# Patient Record
Sex: MALE | Race: ASIAN | NOT HISPANIC OR LATINO | Employment: OTHER | URBAN - METROPOLITAN AREA
[De-identification: names, ages, dates, MRNs, and addresses within clinical notes are randomized per-mention and may not be internally consistent; named-entity substitution may affect disease eponyms.]

---

## 2020-12-25 ENCOUNTER — HOSPITAL ENCOUNTER (INPATIENT)
Facility: HOSPITAL | Age: 70
LOS: 3 days | Discharge: HOME | DRG: 247 | End: 2020-12-28
Attending: EMERGENCY MEDICINE | Admitting: INTERNAL MEDICINE
Payer: MEDICAID

## 2020-12-25 ENCOUNTER — APPOINTMENT (EMERGENCY)
Dept: RADIOLOGY | Facility: HOSPITAL | Age: 70
DRG: 247 | End: 2020-12-25
Payer: MEDICAID

## 2020-12-25 DIAGNOSIS — F32.A DEPRESSION: ICD-10-CM

## 2020-12-25 DIAGNOSIS — I16.0 HYPERTENSIVE URGENCY: ICD-10-CM

## 2020-12-25 DIAGNOSIS — K56.600 PARTIAL SMALL BOWEL OBSTRUCTION (HCC): Primary | ICD-10-CM

## 2020-12-25 DIAGNOSIS — Z86.73 HISTORY OF HEMORRHAGIC CEREBROVASCULAR ACCIDENT (CVA) WITHOUT RESIDUAL DEFICITS: ICD-10-CM

## 2020-12-25 DIAGNOSIS — R10.9 ABDOMINAL PAIN: ICD-10-CM

## 2020-12-25 DIAGNOSIS — K85.90 PANCREATITIS: ICD-10-CM

## 2020-12-25 DIAGNOSIS — K85.90 ACUTE PANCREATITIS: ICD-10-CM

## 2020-12-25 PROBLEM — K59.00 OBSTIPATION: Status: ACTIVE | Noted: 2020-12-25

## 2020-12-25 PROBLEM — R65.10 SYSTEMIC INFLAMMATORY RESPONSE SYNDROME (HCC): Status: ACTIVE | Noted: 2020-12-25

## 2020-12-25 PROBLEM — Z87.19 STATUS POST RIGHT INGUINAL HERNIA REPAIR: Status: ACTIVE | Noted: 2020-12-25

## 2020-12-25 PROBLEM — Z98.890 STATUS POST RIGHT INGUINAL HERNIA REPAIR: Status: ACTIVE | Noted: 2020-12-25

## 2020-12-25 LAB
ALBUMIN SERPL BCP-MCNC: 3.5 G/DL (ref 3.5–5)
ALP SERPL-CCNC: 100 U/L (ref 46–116)
ALT SERPL W P-5'-P-CCNC: 39 U/L (ref 12–78)
ANION GAP SERPL CALCULATED.3IONS-SCNC: 11 MMOL/L (ref 4–13)
APTT PPP: 36 SECONDS (ref 23–37)
AST SERPL W P-5'-P-CCNC: 45 U/L (ref 5–45)
BACTERIA UR QL AUTO: NORMAL /HPF
BASOPHILS # BLD AUTO: 0.03 THOUSANDS/ΜL (ref 0–0.1)
BASOPHILS NFR BLD AUTO: 0 % (ref 0–1)
BILIRUB SERPL-MCNC: 0.8 MG/DL (ref 0.2–1)
BILIRUB UR QL STRIP: NEGATIVE
BUN SERPL-MCNC: 24 MG/DL (ref 5–25)
CALCIUM SERPL-MCNC: 9.2 MG/DL (ref 8.3–10.1)
CHLORIDE SERPL-SCNC: 95 MMOL/L (ref 100–108)
CLARITY UR: CLEAR
CO2 SERPL-SCNC: 29 MMOL/L (ref 21–32)
COLOR UR: YELLOW
CREAT SERPL-MCNC: 1.28 MG/DL (ref 0.6–1.3)
EOSINOPHIL # BLD AUTO: 0.01 THOUSAND/ΜL (ref 0–0.61)
EOSINOPHIL NFR BLD AUTO: 0 % (ref 0–6)
ERYTHROCYTE [DISTWIDTH] IN BLOOD BY AUTOMATED COUNT: 12.8 % (ref 11.6–15.1)
GFR SERPL CREATININE-BSD FRML MDRD: 56 ML/MIN/1.73SQ M
GLUCOSE SERPL-MCNC: 121 MG/DL (ref 65–140)
GLUCOSE UR STRIP-MCNC: NEGATIVE MG/DL
HCT VFR BLD AUTO: 41.6 % (ref 36.5–49.3)
HGB BLD-MCNC: 13.2 G/DL (ref 12–17)
HGB UR QL STRIP.AUTO: ABNORMAL
IMM GRANULOCYTES # BLD AUTO: 0.07 THOUSAND/UL (ref 0–0.2)
IMM GRANULOCYTES NFR BLD AUTO: 0 % (ref 0–2)
INR PPP: 1.22 (ref 0.84–1.19)
KETONES UR STRIP-MCNC: NEGATIVE MG/DL
LEUKOCYTE ESTERASE UR QL STRIP: NEGATIVE
LIPASE SERPL-CCNC: 146 U/L (ref 73–393)
LYMPHOCYTES # BLD AUTO: 1.17 THOUSANDS/ΜL (ref 0.6–4.47)
LYMPHOCYTES NFR BLD AUTO: 7 % (ref 14–44)
MCH RBC QN AUTO: 27.5 PG (ref 26.8–34.3)
MCHC RBC AUTO-ENTMCNC: 31.7 G/DL (ref 31.4–37.4)
MCV RBC AUTO: 87 FL (ref 82–98)
MONOCYTES # BLD AUTO: 1.38 THOUSAND/ΜL (ref 0.17–1.22)
MONOCYTES NFR BLD AUTO: 8 % (ref 4–12)
NEUTROPHILS # BLD AUTO: 15.07 THOUSANDS/ΜL (ref 1.85–7.62)
NEUTS SEG NFR BLD AUTO: 85 % (ref 43–75)
NITRITE UR QL STRIP: NEGATIVE
NON-SQ EPI CELLS URNS QL MICRO: NORMAL /HPF
NRBC BLD AUTO-RTO: 0 /100 WBCS
PH UR STRIP.AUTO: 6.5 [PH]
PLATELET # BLD AUTO: 306 THOUSANDS/UL (ref 149–390)
PMV BLD AUTO: 9.8 FL (ref 8.9–12.7)
POTASSIUM SERPL-SCNC: 2.9 MMOL/L (ref 3.5–5.3)
PROT SERPL-MCNC: 8.4 G/DL (ref 6.4–8.2)
PROT UR STRIP-MCNC: ABNORMAL MG/DL
PROTHROMBIN TIME: 15.3 SECONDS (ref 11.6–14.5)
RBC # BLD AUTO: 4.8 MILLION/UL (ref 3.88–5.62)
RBC #/AREA URNS AUTO: NORMAL /HPF
SODIUM SERPL-SCNC: 135 MMOL/L (ref 136–145)
SP GR UR STRIP.AUTO: <=1.005 (ref 1–1.03)
UROBILINOGEN UR QL STRIP.AUTO: 0.2 E.U./DL
WBC # BLD AUTO: 17.73 THOUSAND/UL (ref 4.31–10.16)
WBC #/AREA URNS AUTO: NORMAL /HPF

## 2020-12-25 PROCEDURE — 83690 ASSAY OF LIPASE: CPT | Performed by: EMERGENCY MEDICINE

## 2020-12-25 PROCEDURE — 36415 COLL VENOUS BLD VENIPUNCTURE: CPT | Performed by: EMERGENCY MEDICINE

## 2020-12-25 PROCEDURE — 93005 ELECTROCARDIOGRAM TRACING: CPT

## 2020-12-25 PROCEDURE — 90662 IIV NO PRSV INCREASED AG IM: CPT | Performed by: INTERNAL MEDICINE

## 2020-12-25 PROCEDURE — 81001 URINALYSIS AUTO W/SCOPE: CPT | Performed by: INTERNAL MEDICINE

## 2020-12-25 PROCEDURE — 99285 EMERGENCY DEPT VISIT HI MDM: CPT | Performed by: EMERGENCY MEDICINE

## 2020-12-25 PROCEDURE — 99285 EMERGENCY DEPT VISIT HI MDM: CPT

## 2020-12-25 PROCEDURE — 80053 COMPREHEN METABOLIC PANEL: CPT | Performed by: EMERGENCY MEDICINE

## 2020-12-25 PROCEDURE — 96374 THER/PROPH/DIAG INJ IV PUSH: CPT

## 2020-12-25 PROCEDURE — 96361 HYDRATE IV INFUSION ADD-ON: CPT

## 2020-12-25 PROCEDURE — G1004 CDSM NDSC: HCPCS

## 2020-12-25 PROCEDURE — 99223 1ST HOSP IP/OBS HIGH 75: CPT | Performed by: INTERNAL MEDICINE

## 2020-12-25 PROCEDURE — 85610 PROTHROMBIN TIME: CPT | Performed by: EMERGENCY MEDICINE

## 2020-12-25 PROCEDURE — 96375 TX/PRO/DX INJ NEW DRUG ADDON: CPT

## 2020-12-25 PROCEDURE — 85730 THROMBOPLASTIN TIME PARTIAL: CPT | Performed by: EMERGENCY MEDICINE

## 2020-12-25 PROCEDURE — 90471 IMMUNIZATION ADMIN: CPT | Performed by: INTERNAL MEDICINE

## 2020-12-25 PROCEDURE — 85025 COMPLETE CBC W/AUTO DIFF WBC: CPT | Performed by: EMERGENCY MEDICINE

## 2020-12-25 PROCEDURE — 74177 CT ABD & PELVIS W/CONTRAST: CPT

## 2020-12-25 RX ORDER — ONDANSETRON 2 MG/ML
4 INJECTION INTRAMUSCULAR; INTRAVENOUS EVERY 6 HOURS PRN
Status: DISCONTINUED | OUTPATIENT
Start: 2020-12-25 | End: 2020-12-28 | Stop reason: HOSPADM

## 2020-12-25 RX ORDER — ATENOLOL 25 MG/1
25 TABLET ORAL DAILY
Status: CANCELLED | OUTPATIENT
Start: 2020-12-26

## 2020-12-25 RX ORDER — POTASSIUM CHLORIDE 14.9 MG/ML
20 INJECTION INTRAVENOUS ONCE
Status: COMPLETED | OUTPATIENT
Start: 2020-12-25 | End: 2020-12-25

## 2020-12-25 RX ORDER — LABETALOL 20 MG/4 ML (5 MG/ML) INTRAVENOUS SYRINGE
10 ONCE
Status: COMPLETED | OUTPATIENT
Start: 2020-12-25 | End: 2020-12-25

## 2020-12-25 RX ORDER — ONDANSETRON 2 MG/ML
4 INJECTION INTRAMUSCULAR; INTRAVENOUS ONCE
Status: COMPLETED | OUTPATIENT
Start: 2020-12-25 | End: 2020-12-25

## 2020-12-25 RX ORDER — ACETAMINOPHEN 325 MG/1
650 TABLET ORAL EVERY 4 HOURS PRN
Status: DISCONTINUED | OUTPATIENT
Start: 2020-12-25 | End: 2020-12-28 | Stop reason: HOSPADM

## 2020-12-25 RX ORDER — DEXTROSE AND SODIUM CHLORIDE 5; .45 G/100ML; G/100ML
100 INJECTION, SOLUTION INTRAVENOUS CONTINUOUS
Status: DISCONTINUED | OUTPATIENT
Start: 2020-12-25 | End: 2020-12-28 | Stop reason: HOSPADM

## 2020-12-25 RX ORDER — LABETALOL 100 MG/1
100 TABLET, FILM COATED ORAL EVERY 12 HOURS SCHEDULED
Status: DISCONTINUED | OUTPATIENT
Start: 2020-12-25 | End: 2020-12-26

## 2020-12-25 RX ORDER — ATENOLOL 25 MG/1
25 TABLET ORAL AS NEEDED
COMMUNITY
End: 2020-12-28 | Stop reason: HOSPADM

## 2020-12-25 RX ORDER — AMLODIPINE BESYLATE 5 MG/1
5 TABLET ORAL 2 TIMES DAILY
Status: DISCONTINUED | OUTPATIENT
Start: 2020-12-25 | End: 2020-12-28 | Stop reason: HOSPADM

## 2020-12-25 RX ORDER — LOSARTAN POTASSIUM 25 MG/1
25 TABLET ORAL 2 TIMES DAILY
Status: DISCONTINUED | OUTPATIENT
Start: 2020-12-25 | End: 2020-12-27

## 2020-12-25 RX ORDER — RANITIDINE 150 MG/1
150 TABLET ORAL
COMMUNITY
End: 2020-12-28 | Stop reason: HOSPADM

## 2020-12-25 RX ORDER — POTASSIUM CHLORIDE 20 MEQ/1
40 TABLET, EXTENDED RELEASE ORAL ONCE
Status: COMPLETED | OUTPATIENT
Start: 2020-12-25 | End: 2020-12-25

## 2020-12-25 RX ORDER — MORPHINE SULFATE 4 MG/ML
4 INJECTION, SOLUTION INTRAMUSCULAR; INTRAVENOUS ONCE
Status: COMPLETED | OUTPATIENT
Start: 2020-12-25 | End: 2020-12-25

## 2020-12-25 RX ADMIN — AMLODIPINE BESYLATE 5 MG: 5 TABLET ORAL at 20:28

## 2020-12-25 RX ADMIN — LOSARTAN POTASSIUM 25 MG: 25 TABLET, FILM COATED ORAL at 20:28

## 2020-12-25 RX ADMIN — ONDANSETRON 4 MG: 2 INJECTION INTRAMUSCULAR; INTRAVENOUS at 16:28

## 2020-12-25 RX ADMIN — DEXTROSE AND SODIUM CHLORIDE 100 ML/HR: 5; .45 INJECTION, SOLUTION INTRAVENOUS at 20:25

## 2020-12-25 RX ADMIN — FAMOTIDINE 20 MG: 10 INJECTION INTRAVENOUS at 20:29

## 2020-12-25 RX ADMIN — MORPHINE SULFATE 4 MG: 4 INJECTION INTRAVENOUS at 16:28

## 2020-12-25 RX ADMIN — SODIUM CHLORIDE 1000 ML: 0.9 INJECTION, SOLUTION INTRAVENOUS at 16:28

## 2020-12-25 RX ADMIN — LABETALOL HYDROCHLORIDE 100 MG: 100 TABLET ORAL at 20:29

## 2020-12-25 RX ADMIN — POTASSIUM CHLORIDE 40 MEQ: 1500 TABLET, EXTENDED RELEASE ORAL at 18:24

## 2020-12-25 RX ADMIN — POTASSIUM CHLORIDE 20 MEQ: 14.9 INJECTION, SOLUTION INTRAVENOUS at 18:24

## 2020-12-25 RX ADMIN — INFLUENZA A VIRUS A/MICHIGAN/45/2015 X-275 (H1N1) ANTIGEN (FORMALDEHYDE INACTIVATED), INFLUENZA A VIRUS A/SINGAPORE/INFIMH-16-0019/2016 IVR-186 (H3N2) ANTIGEN (FORMALDEHYDE INACTIVATED), INFLUENZA B VIRUS B/PHUKET/3073/2013 ANTIGEN (FORMALDEHYDE INACTIVATED), AND INFLUENZA B VIRUS B/MARYLAND/15/2016 BX-69A ANTIGEN (FORMALDEHYDE INACTIVATED) 0.7 ML: 60; 60; 60; 60 INJECTION, SUSPENSION INTRAMUSCULAR at 20:29

## 2020-12-25 RX ADMIN — IOHEXOL 100 ML: 350 INJECTION, SOLUTION INTRAVENOUS at 17:22

## 2020-12-25 RX ADMIN — LABETALOL 20 MG/4 ML (5 MG/ML) INTRAVENOUS SYRINGE 10 MG: at 18:14

## 2020-12-26 ENCOUNTER — APPOINTMENT (INPATIENT)
Dept: RADIOLOGY | Facility: HOSPITAL | Age: 70
DRG: 247 | End: 2020-12-26
Payer: MEDICAID

## 2020-12-26 LAB
ALBUMIN SERPL BCP-MCNC: 2.7 G/DL (ref 3.5–5)
ALP SERPL-CCNC: 80 U/L (ref 46–116)
ALT SERPL W P-5'-P-CCNC: 31 U/L (ref 12–78)
ANION GAP SERPL CALCULATED.3IONS-SCNC: 7 MMOL/L (ref 4–13)
AST SERPL W P-5'-P-CCNC: 30 U/L (ref 5–45)
BASOPHILS # BLD AUTO: 0.02 THOUSANDS/ΜL (ref 0–0.1)
BASOPHILS NFR BLD AUTO: 0 % (ref 0–1)
BILIRUB DIRECT SERPL-MCNC: 0.19 MG/DL (ref 0–0.2)
BILIRUB SERPL-MCNC: 0.5 MG/DL (ref 0.2–1)
BUN SERPL-MCNC: 18 MG/DL (ref 5–25)
CALCIUM SERPL-MCNC: 8.2 MG/DL (ref 8.3–10.1)
CHLORIDE SERPL-SCNC: 101 MMOL/L (ref 100–108)
CHOLEST SERPL-MCNC: 98 MG/DL (ref 50–200)
CO2 SERPL-SCNC: 26 MMOL/L (ref 21–32)
CREAT SERPL-MCNC: 1.04 MG/DL (ref 0.6–1.3)
EOSINOPHIL # BLD AUTO: 0.01 THOUSAND/ΜL (ref 0–0.61)
EOSINOPHIL NFR BLD AUTO: 0 % (ref 0–6)
ERYTHROCYTE [DISTWIDTH] IN BLOOD BY AUTOMATED COUNT: 12.7 % (ref 11.6–15.1)
EST. AVERAGE GLUCOSE BLD GHB EST-MCNC: 114 MG/DL
GFR SERPL CREATININE-BSD FRML MDRD: 71 ML/MIN/1.73SQ M
GLUCOSE SERPL-MCNC: 142 MG/DL (ref 65–140)
HBA1C MFR BLD: 5.6 %
HCT VFR BLD AUTO: 34.2 % (ref 36.5–49.3)
HDLC SERPL-MCNC: 46 MG/DL
HGB BLD-MCNC: 10.9 G/DL (ref 12–17)
IMM GRANULOCYTES # BLD AUTO: 0.05 THOUSAND/UL (ref 0–0.2)
IMM GRANULOCYTES NFR BLD AUTO: 0 % (ref 0–2)
LDLC SERPL CALC-MCNC: 40 MG/DL (ref 0–100)
LIPASE SERPL-CCNC: 109 U/L (ref 73–393)
LYMPHOCYTES # BLD AUTO: 0.84 THOUSANDS/ΜL (ref 0.6–4.47)
LYMPHOCYTES NFR BLD AUTO: 7 % (ref 14–44)
MCH RBC QN AUTO: 27.5 PG (ref 26.8–34.3)
MCHC RBC AUTO-ENTMCNC: 31.9 G/DL (ref 31.4–37.4)
MCV RBC AUTO: 86 FL (ref 82–98)
MONOCYTES # BLD AUTO: 1.17 THOUSAND/ΜL (ref 0.17–1.22)
MONOCYTES NFR BLD AUTO: 10 % (ref 4–12)
NEUTROPHILS # BLD AUTO: 9.8 THOUSANDS/ΜL (ref 1.85–7.62)
NEUTS SEG NFR BLD AUTO: 83 % (ref 43–75)
NRBC BLD AUTO-RTO: 0 /100 WBCS
PLATELET # BLD AUTO: 240 THOUSANDS/UL (ref 149–390)
PMV BLD AUTO: 10 FL (ref 8.9–12.7)
POTASSIUM SERPL-SCNC: 3.2 MMOL/L (ref 3.5–5.3)
PROT SERPL-MCNC: 6.9 G/DL (ref 6.4–8.2)
RBC # BLD AUTO: 3.97 MILLION/UL (ref 3.88–5.62)
SODIUM SERPL-SCNC: 134 MMOL/L (ref 136–145)
TRIGL SERPL-MCNC: 60 MG/DL
WBC # BLD AUTO: 11.89 THOUSAND/UL (ref 4.31–10.16)

## 2020-12-26 PROCEDURE — 80061 LIPID PANEL: CPT | Performed by: INTERNAL MEDICINE

## 2020-12-26 PROCEDURE — 85025 COMPLETE CBC W/AUTO DIFF WBC: CPT | Performed by: INTERNAL MEDICINE

## 2020-12-26 PROCEDURE — 99222 1ST HOSP IP/OBS MODERATE 55: CPT | Performed by: SURGERY

## 2020-12-26 PROCEDURE — G1004 CDSM NDSC: HCPCS

## 2020-12-26 PROCEDURE — 74177 CT ABD & PELVIS W/CONTRAST: CPT

## 2020-12-26 PROCEDURE — 83690 ASSAY OF LIPASE: CPT | Performed by: INTERNAL MEDICINE

## 2020-12-26 PROCEDURE — 76705 ECHO EXAM OF ABDOMEN: CPT

## 2020-12-26 PROCEDURE — 83036 HEMOGLOBIN GLYCOSYLATED A1C: CPT | Performed by: INTERNAL MEDICINE

## 2020-12-26 PROCEDURE — 80076 HEPATIC FUNCTION PANEL: CPT | Performed by: INTERNAL MEDICINE

## 2020-12-26 PROCEDURE — 80048 BASIC METABOLIC PNL TOTAL CA: CPT | Performed by: INTERNAL MEDICINE

## 2020-12-26 RX ORDER — LABETALOL 100 MG/1
200 TABLET, FILM COATED ORAL EVERY 12 HOURS SCHEDULED
Status: DISCONTINUED | OUTPATIENT
Start: 2020-12-26 | End: 2020-12-28 | Stop reason: HOSPADM

## 2020-12-26 RX ADMIN — AMLODIPINE BESYLATE 5 MG: 5 TABLET ORAL at 09:56

## 2020-12-26 RX ADMIN — AMLODIPINE BESYLATE 5 MG: 5 TABLET ORAL at 21:22

## 2020-12-26 RX ADMIN — DEXTROSE AND SODIUM CHLORIDE 100 ML/HR: 5; .45 INJECTION, SOLUTION INTRAVENOUS at 06:14

## 2020-12-26 RX ADMIN — ENOXAPARIN SODIUM 40 MG: 40 INJECTION SUBCUTANEOUS at 09:57

## 2020-12-26 RX ADMIN — DEXTROSE AND SODIUM CHLORIDE 100 ML/HR: 5; .45 INJECTION, SOLUTION INTRAVENOUS at 16:58

## 2020-12-26 RX ADMIN — LOSARTAN POTASSIUM 25 MG: 25 TABLET, FILM COATED ORAL at 21:23

## 2020-12-26 RX ADMIN — FAMOTIDINE 20 MG: 10 INJECTION INTRAVENOUS at 09:57

## 2020-12-26 RX ADMIN — LOSARTAN POTASSIUM 25 MG: 25 TABLET, FILM COATED ORAL at 09:58

## 2020-12-26 RX ADMIN — MORPHINE SULFATE 2 MG: 2 INJECTION, SOLUTION INTRAMUSCULAR; INTRAVENOUS at 18:39

## 2020-12-26 RX ADMIN — LABETALOL HYDROCHLORIDE 200 MG: 100 TABLET ORAL at 21:22

## 2020-12-26 RX ADMIN — FAMOTIDINE 20 MG: 10 INJECTION INTRAVENOUS at 21:23

## 2020-12-26 RX ADMIN — IOHEXOL 100 ML: 350 INJECTION, SOLUTION INTRAVENOUS at 11:30

## 2020-12-26 RX ADMIN — IOHEXOL 50 ML: 240 INJECTION, SOLUTION INTRATHECAL; INTRAVASCULAR; INTRAVENOUS; ORAL at 09:57

## 2020-12-27 ENCOUNTER — APPOINTMENT (INPATIENT)
Dept: RADIOLOGY | Facility: HOSPITAL | Age: 70
DRG: 247 | End: 2020-12-27
Payer: MEDICAID

## 2020-12-27 LAB
ANION GAP SERPL CALCULATED.3IONS-SCNC: 6 MMOL/L (ref 4–13)
ATRIAL RATE: 95 BPM
BASOPHILS # BLD AUTO: 0.04 THOUSANDS/ΜL (ref 0–0.1)
BASOPHILS NFR BLD AUTO: 0 % (ref 0–1)
BUN SERPL-MCNC: 12 MG/DL (ref 5–25)
CALCIUM SERPL-MCNC: 8.5 MG/DL (ref 8.3–10.1)
CHLORIDE SERPL-SCNC: 100 MMOL/L (ref 100–108)
CO2 SERPL-SCNC: 28 MMOL/L (ref 21–32)
CREAT SERPL-MCNC: 1.06 MG/DL (ref 0.6–1.3)
EOSINOPHIL # BLD AUTO: 0.06 THOUSAND/ΜL (ref 0–0.61)
EOSINOPHIL NFR BLD AUTO: 1 % (ref 0–6)
ERYTHROCYTE [DISTWIDTH] IN BLOOD BY AUTOMATED COUNT: 12.9 % (ref 11.6–15.1)
GFR SERPL CREATININE-BSD FRML MDRD: 70 ML/MIN/1.73SQ M
GLUCOSE SERPL-MCNC: 113 MG/DL (ref 65–140)
HCT VFR BLD AUTO: 35.6 % (ref 36.5–49.3)
HGB BLD-MCNC: 11.2 G/DL (ref 12–17)
IMM GRANULOCYTES # BLD AUTO: 0.03 THOUSAND/UL (ref 0–0.2)
IMM GRANULOCYTES NFR BLD AUTO: 0 % (ref 0–2)
LYMPHOCYTES # BLD AUTO: 1.07 THOUSANDS/ΜL (ref 0.6–4.47)
LYMPHOCYTES NFR BLD AUTO: 12 % (ref 14–44)
MCH RBC QN AUTO: 27.5 PG (ref 26.8–34.3)
MCHC RBC AUTO-ENTMCNC: 31.5 G/DL (ref 31.4–37.4)
MCV RBC AUTO: 87 FL (ref 82–98)
MONOCYTES # BLD AUTO: 1.18 THOUSAND/ΜL (ref 0.17–1.22)
MONOCYTES NFR BLD AUTO: 13 % (ref 4–12)
NEUTROPHILS # BLD AUTO: 6.76 THOUSANDS/ΜL (ref 1.85–7.62)
NEUTS SEG NFR BLD AUTO: 74 % (ref 43–75)
NRBC BLD AUTO-RTO: 0 /100 WBCS
P AXIS: 27 DEGREES
PLATELET # BLD AUTO: 239 THOUSANDS/UL (ref 149–390)
PMV BLD AUTO: 9.2 FL (ref 8.9–12.7)
POTASSIUM SERPL-SCNC: 3.1 MMOL/L (ref 3.5–5.3)
PR INTERVAL: 170 MS
QRS AXIS: 0 DEGREES
QRSD INTERVAL: 90 MS
QT INTERVAL: 344 MS
QTC INTERVAL: 432 MS
RBC # BLD AUTO: 4.08 MILLION/UL (ref 3.88–5.62)
SODIUM SERPL-SCNC: 134 MMOL/L (ref 136–145)
T WAVE AXIS: 0 DEGREES
VENTRICULAR RATE: 95 BPM
WBC # BLD AUTO: 9.14 THOUSAND/UL (ref 4.31–10.16)

## 2020-12-27 PROCEDURE — 74018 RADEX ABDOMEN 1 VIEW: CPT

## 2020-12-27 PROCEDURE — 99232 SBSQ HOSP IP/OBS MODERATE 35: CPT | Performed by: INTERNAL MEDICINE

## 2020-12-27 PROCEDURE — 80048 BASIC METABOLIC PNL TOTAL CA: CPT | Performed by: SURGERY

## 2020-12-27 PROCEDURE — 85025 COMPLETE CBC W/AUTO DIFF WBC: CPT | Performed by: SURGERY

## 2020-12-27 PROCEDURE — 99232 SBSQ HOSP IP/OBS MODERATE 35: CPT | Performed by: SURGERY

## 2020-12-27 PROCEDURE — 97162 PT EVAL MOD COMPLEX 30 MIN: CPT

## 2020-12-27 PROCEDURE — 93010 ELECTROCARDIOGRAM REPORT: CPT | Performed by: INTERNAL MEDICINE

## 2020-12-27 RX ORDER — MAGNESIUM CARB/ALUMINUM HYDROX 105-160MG
296 TABLET,CHEWABLE ORAL ONCE
Status: COMPLETED | OUTPATIENT
Start: 2020-12-27 | End: 2020-12-27

## 2020-12-27 RX ORDER — POTASSIUM CHLORIDE 29.8 MG/ML
40 INJECTION INTRAVENOUS ONCE
Status: DISCONTINUED | OUTPATIENT
Start: 2020-12-27 | End: 2020-12-27 | Stop reason: CLARIF

## 2020-12-27 RX ORDER — MINERAL OIL 100 G/100G
1 OIL RECTAL ONCE
Status: COMPLETED | OUTPATIENT
Start: 2020-12-27 | End: 2020-12-27

## 2020-12-27 RX ORDER — POTASSIUM CHLORIDE 20 MEQ/1
40 TABLET, EXTENDED RELEASE ORAL ONCE
Status: COMPLETED | OUTPATIENT
Start: 2020-12-27 | End: 2020-12-27

## 2020-12-27 RX ORDER — ASPIRIN 81 MG/1
81 TABLET ORAL DAILY
Status: DISCONTINUED | OUTPATIENT
Start: 2020-12-27 | End: 2020-12-28 | Stop reason: HOSPADM

## 2020-12-27 RX ORDER — POLYETHYLENE GLYCOL 3350 17 G/17G
17 POWDER, FOR SOLUTION ORAL DAILY PRN
Status: DISCONTINUED | OUTPATIENT
Start: 2020-12-27 | End: 2020-12-28 | Stop reason: HOSPADM

## 2020-12-27 RX ORDER — ATORVASTATIN CALCIUM 10 MG/1
10 TABLET, FILM COATED ORAL
Status: DISCONTINUED | OUTPATIENT
Start: 2020-12-27 | End: 2020-12-28 | Stop reason: HOSPADM

## 2020-12-27 RX ORDER — SENNOSIDES 8.6 MG
1 TABLET ORAL
Status: DISCONTINUED | OUTPATIENT
Start: 2020-12-27 | End: 2020-12-28 | Stop reason: HOSPADM

## 2020-12-27 RX ORDER — LOSARTAN POTASSIUM 50 MG/1
50 TABLET ORAL 2 TIMES DAILY
Status: DISCONTINUED | OUTPATIENT
Start: 2020-12-27 | End: 2020-12-28 | Stop reason: HOSPADM

## 2020-12-27 RX ORDER — DOCUSATE SODIUM 100 MG/1
100 CAPSULE, LIQUID FILLED ORAL 2 TIMES DAILY
Status: DISCONTINUED | OUTPATIENT
Start: 2020-12-27 | End: 2020-12-28 | Stop reason: HOSPADM

## 2020-12-27 RX ORDER — POTASSIUM CHLORIDE 14.9 MG/ML
20 INJECTION INTRAVENOUS ONCE
Status: COMPLETED | OUTPATIENT
Start: 2020-12-27 | End: 2020-12-28

## 2020-12-27 RX ADMIN — DEXTROSE AND SODIUM CHLORIDE 100 ML/HR: 5; .45 INJECTION, SOLUTION INTRAVENOUS at 22:40

## 2020-12-27 RX ADMIN — POTASSIUM CHLORIDE 20 MEQ: 14.9 INJECTION, SOLUTION INTRAVENOUS at 12:22

## 2020-12-27 RX ADMIN — MORPHINE SULFATE 2 MG: 2 INJECTION, SOLUTION INTRAMUSCULAR; INTRAVENOUS at 00:09

## 2020-12-27 RX ADMIN — DEXTROSE AND SODIUM CHLORIDE 100 ML/HR: 5; .45 INJECTION, SOLUTION INTRAVENOUS at 10:21

## 2020-12-27 RX ADMIN — DEXTROSE AND SODIUM CHLORIDE 100 ML/HR: 5; .45 INJECTION, SOLUTION INTRAVENOUS at 02:26

## 2020-12-27 RX ADMIN — ASPIRIN 81 MG: 81 TABLET, COATED ORAL at 10:07

## 2020-12-27 RX ADMIN — DOCUSATE SODIUM 100 MG: 100 CAPSULE, LIQUID FILLED ORAL at 09:59

## 2020-12-27 RX ADMIN — AMLODIPINE BESYLATE 5 MG: 5 TABLET ORAL at 21:13

## 2020-12-27 RX ADMIN — AMLODIPINE BESYLATE 5 MG: 5 TABLET ORAL at 09:59

## 2020-12-27 RX ADMIN — ENOXAPARIN SODIUM 40 MG: 40 INJECTION SUBCUTANEOUS at 09:59

## 2020-12-27 RX ADMIN — ATORVASTATIN CALCIUM 10 MG: 10 TABLET, FILM COATED ORAL at 17:02

## 2020-12-27 RX ADMIN — DOCUSATE SODIUM 100 MG: 100 CAPSULE, LIQUID FILLED ORAL at 17:02

## 2020-12-27 RX ADMIN — POTASSIUM CHLORIDE 40 MEQ: 1500 TABLET, EXTENDED RELEASE ORAL at 10:07

## 2020-12-27 RX ADMIN — FAMOTIDINE 20 MG: 10 INJECTION INTRAVENOUS at 21:14

## 2020-12-27 RX ADMIN — MINERAL OIL 1 ENEMA: 100 ENEMA RECTAL at 10:13

## 2020-12-27 RX ADMIN — LABETALOL HYDROCHLORIDE 200 MG: 100 TABLET ORAL at 09:59

## 2020-12-27 RX ADMIN — MAGNESIUM CITRATE 296 ML: 1.75 LIQUID ORAL at 10:08

## 2020-12-27 RX ADMIN — SENNOSIDES 8.6 MG: 8.6 TABLET ORAL at 21:19

## 2020-12-27 RX ADMIN — LABETALOL HYDROCHLORIDE 200 MG: 100 TABLET ORAL at 21:13

## 2020-12-27 RX ADMIN — FAMOTIDINE 20 MG: 10 INJECTION INTRAVENOUS at 09:59

## 2020-12-27 RX ADMIN — LOSARTAN POTASSIUM 50 MG: 50 TABLET, FILM COATED ORAL at 21:14

## 2020-12-27 RX ADMIN — POTASSIUM CHLORIDE 20 MEQ: 14.9 INJECTION, SOLUTION INTRAVENOUS at 10:17

## 2020-12-28 VITALS
HEIGHT: 74 IN | RESPIRATION RATE: 17 BRPM | SYSTOLIC BLOOD PRESSURE: 122 MMHG | OXYGEN SATURATION: 98 % | WEIGHT: 168.21 LBS | HEART RATE: 72 BPM | DIASTOLIC BLOOD PRESSURE: 80 MMHG | BODY MASS INDEX: 21.59 KG/M2 | TEMPERATURE: 99.3 F

## 2020-12-28 LAB
ALBUMIN SERPL BCP-MCNC: 2.4 G/DL (ref 3.5–5)
ALP SERPL-CCNC: 80 U/L (ref 46–116)
ALT SERPL W P-5'-P-CCNC: 55 U/L (ref 12–78)
ANION GAP SERPL CALCULATED.3IONS-SCNC: 7 MMOL/L (ref 4–13)
AST SERPL W P-5'-P-CCNC: 41 U/L (ref 5–45)
BASOPHILS # BLD AUTO: 0.05 THOUSANDS/ΜL (ref 0–0.1)
BASOPHILS NFR BLD AUTO: 1 % (ref 0–1)
BILIRUB SERPL-MCNC: 0.4 MG/DL (ref 0.2–1)
BUN SERPL-MCNC: 10 MG/DL (ref 5–25)
CALCIUM ALBUM COR SERPL-MCNC: 9.3 MG/DL (ref 8.3–10.1)
CALCIUM SERPL-MCNC: 8 MG/DL (ref 8.3–10.1)
CHLORIDE SERPL-SCNC: 101 MMOL/L (ref 100–108)
CO2 SERPL-SCNC: 25 MMOL/L (ref 21–32)
CREAT SERPL-MCNC: 0.95 MG/DL (ref 0.6–1.3)
EOSINOPHIL # BLD AUTO: 0.11 THOUSAND/ΜL (ref 0–0.61)
EOSINOPHIL NFR BLD AUTO: 1 % (ref 0–6)
ERYTHROCYTE [DISTWIDTH] IN BLOOD BY AUTOMATED COUNT: 12.6 % (ref 11.6–15.1)
GFR SERPL CREATININE-BSD FRML MDRD: 80 ML/MIN/1.73SQ M
GLUCOSE SERPL-MCNC: 127 MG/DL (ref 65–140)
HCT VFR BLD AUTO: 31.9 % (ref 36.5–49.3)
HGB BLD-MCNC: 10.2 G/DL (ref 12–17)
IMM GRANULOCYTES # BLD AUTO: 0.04 THOUSAND/UL (ref 0–0.2)
IMM GRANULOCYTES NFR BLD AUTO: 1 % (ref 0–2)
LYMPHOCYTES # BLD AUTO: 1.05 THOUSANDS/ΜL (ref 0.6–4.47)
LYMPHOCYTES NFR BLD AUTO: 13 % (ref 14–44)
MAGNESIUM SERPL-MCNC: 2.1 MG/DL (ref 1.6–2.6)
MCH RBC QN AUTO: 27.4 PG (ref 26.8–34.3)
MCHC RBC AUTO-ENTMCNC: 32 G/DL (ref 31.4–37.4)
MCV RBC AUTO: 86 FL (ref 82–98)
MONOCYTES # BLD AUTO: 1.26 THOUSAND/ΜL (ref 0.17–1.22)
MONOCYTES NFR BLD AUTO: 15 % (ref 4–12)
NEUTROPHILS # BLD AUTO: 5.69 THOUSANDS/ΜL (ref 1.85–7.62)
NEUTS SEG NFR BLD AUTO: 69 % (ref 43–75)
NRBC BLD AUTO-RTO: 0 /100 WBCS
PHOSPHATE SERPL-MCNC: 2.8 MG/DL (ref 2.3–4.1)
PLATELET # BLD AUTO: 224 THOUSANDS/UL (ref 149–390)
PMV BLD AUTO: 9.3 FL (ref 8.9–12.7)
POTASSIUM SERPL-SCNC: 3.3 MMOL/L (ref 3.5–5.3)
PROT SERPL-MCNC: 6.4 G/DL (ref 6.4–8.2)
RBC # BLD AUTO: 3.72 MILLION/UL (ref 3.88–5.62)
SODIUM SERPL-SCNC: 133 MMOL/L (ref 136–145)
WBC # BLD AUTO: 8.2 THOUSAND/UL (ref 4.31–10.16)

## 2020-12-28 PROCEDURE — 84100 ASSAY OF PHOSPHORUS: CPT | Performed by: INTERNAL MEDICINE

## 2020-12-28 PROCEDURE — 99232 SBSQ HOSP IP/OBS MODERATE 35: CPT | Performed by: SURGERY

## 2020-12-28 PROCEDURE — 99239 HOSP IP/OBS DSCHRG MGMT >30: CPT | Performed by: INTERNAL MEDICINE

## 2020-12-28 PROCEDURE — 80053 COMPREHEN METABOLIC PANEL: CPT | Performed by: INTERNAL MEDICINE

## 2020-12-28 PROCEDURE — 85025 COMPLETE CBC W/AUTO DIFF WBC: CPT | Performed by: INTERNAL MEDICINE

## 2020-12-28 PROCEDURE — 83735 ASSAY OF MAGNESIUM: CPT | Performed by: INTERNAL MEDICINE

## 2020-12-28 RX ORDER — AMLODIPINE BESYLATE 5 MG/1
5 TABLET ORAL 2 TIMES DAILY
Qty: 60 TABLET | Refills: 0 | Status: SHIPPED | OUTPATIENT
Start: 2020-12-28 | End: 2021-01-22 | Stop reason: SDUPTHER

## 2020-12-28 RX ORDER — ASPIRIN 81 MG/1
81 TABLET ORAL DAILY
Qty: 30 TABLET | Refills: 0 | Status: SHIPPED | OUTPATIENT
Start: 2020-12-29 | End: 2021-01-22 | Stop reason: SDUPTHER

## 2020-12-28 RX ORDER — LABETALOL 200 MG/1
200 TABLET, FILM COATED ORAL EVERY 12 HOURS SCHEDULED
Qty: 60 TABLET | Refills: 0 | Status: SHIPPED | OUTPATIENT
Start: 2020-12-28 | End: 2021-01-22 | Stop reason: SDUPTHER

## 2020-12-28 RX ORDER — ATORVASTATIN CALCIUM 10 MG/1
10 TABLET, FILM COATED ORAL
Qty: 30 TABLET | Refills: 0 | Status: SHIPPED | OUTPATIENT
Start: 2020-12-28 | End: 2021-01-22 | Stop reason: SDUPTHER

## 2020-12-28 RX ORDER — POLYETHYLENE GLYCOL 3350 17 G/17G
17 POWDER, FOR SOLUTION ORAL DAILY PRN
Qty: 14 EACH | Refills: 0 | Status: SHIPPED | OUTPATIENT
Start: 2020-12-28 | End: 2021-01-22 | Stop reason: SDUPTHER

## 2020-12-28 RX ORDER — DOCUSATE SODIUM 100 MG/1
100 CAPSULE, LIQUID FILLED ORAL 2 TIMES DAILY
Qty: 10 CAPSULE | Refills: 0 | Status: SHIPPED | OUTPATIENT
Start: 2020-12-28 | End: 2021-01-22 | Stop reason: SDUPTHER

## 2020-12-28 RX ORDER — LOSARTAN POTASSIUM 50 MG/1
50 TABLET ORAL 2 TIMES DAILY
Qty: 60 TABLET | Refills: 0 | Status: SHIPPED | OUTPATIENT
Start: 2020-12-28 | End: 2021-01-22 | Stop reason: SDUPTHER

## 2020-12-28 RX ORDER — ESCITALOPRAM OXALATE 5 MG/1
5 TABLET ORAL DAILY
Qty: 30 TABLET | Refills: 0 | Status: SHIPPED | OUTPATIENT
Start: 2020-12-28 | End: 2021-01-22 | Stop reason: SDUPTHER

## 2020-12-28 RX ADMIN — LABETALOL HYDROCHLORIDE 200 MG: 100 TABLET ORAL at 09:13

## 2020-12-28 RX ADMIN — ATORVASTATIN CALCIUM 10 MG: 10 TABLET, FILM COATED ORAL at 16:23

## 2020-12-28 RX ADMIN — AMLODIPINE BESYLATE 5 MG: 5 TABLET ORAL at 09:12

## 2020-12-28 RX ADMIN — DOCUSATE SODIUM 100 MG: 100 CAPSULE, LIQUID FILLED ORAL at 09:12

## 2020-12-28 RX ADMIN — ENOXAPARIN SODIUM 40 MG: 40 INJECTION SUBCUTANEOUS at 09:27

## 2020-12-28 RX ADMIN — FAMOTIDINE 20 MG: 10 INJECTION INTRAVENOUS at 09:27

## 2020-12-28 RX ADMIN — ASPIRIN 81 MG: 81 TABLET, COATED ORAL at 09:13

## 2020-12-28 RX ADMIN — LOSARTAN POTASSIUM 50 MG: 50 TABLET, FILM COATED ORAL at 09:12

## 2020-12-28 RX ADMIN — DOCUSATE SODIUM 100 MG: 100 CAPSULE, LIQUID FILLED ORAL at 18:00

## 2020-12-28 RX ADMIN — DEXTROSE AND SODIUM CHLORIDE 100 ML/HR: 5; .45 INJECTION, SOLUTION INTRAVENOUS at 05:45

## 2021-01-04 ENCOUNTER — OFFICE VISIT (OUTPATIENT)
Dept: GASTROENTEROLOGY | Facility: CLINIC | Age: 71
End: 2021-01-04
Payer: MEDICAID

## 2021-01-04 VITALS
BODY MASS INDEX: 22.51 KG/M2 | HEIGHT: 74 IN | WEIGHT: 175.4 LBS | SYSTOLIC BLOOD PRESSURE: 124 MMHG | DIASTOLIC BLOOD PRESSURE: 74 MMHG | TEMPERATURE: 96.1 F

## 2021-01-04 DIAGNOSIS — R10.13 EPIGASTRIC PAIN: ICD-10-CM

## 2021-01-04 DIAGNOSIS — K85.90 ACUTE PANCREATITIS: ICD-10-CM

## 2021-01-04 DIAGNOSIS — K56.600 PARTIAL SMALL BOWEL OBSTRUCTION (HCC): ICD-10-CM

## 2021-01-04 DIAGNOSIS — R19.8 CHANGE IN BOWEL MOVEMENT: ICD-10-CM

## 2021-01-04 DIAGNOSIS — D50.8 OTHER IRON DEFICIENCY ANEMIA: ICD-10-CM

## 2021-01-04 DIAGNOSIS — R93.5 ABNORMAL CT OF THE ABDOMEN: Primary | ICD-10-CM

## 2021-01-04 PROBLEM — D64.9 ANEMIA: Status: ACTIVE | Noted: 2021-01-04

## 2021-01-04 PROCEDURE — 99244 OFF/OP CNSLTJ NEW/EST MOD 40: CPT | Performed by: INTERNAL MEDICINE

## 2021-01-04 RX ORDER — POLYETHYLENE GLYCOL 3350 17 G/17G
POWDER, FOR SOLUTION ORAL
COMMUNITY
Start: 2020-12-28 | End: 2021-03-23

## 2021-01-04 RX ORDER — SODIUM, POTASSIUM,MAG SULFATES 17.5-3.13G
1 SOLUTION, RECONSTITUTED, ORAL ORAL ONCE
Qty: 1 BOTTLE | Refills: 0 | Status: SHIPPED | COMMUNITY
Start: 2021-01-04 | End: 2021-04-06 | Stop reason: HOSPADM

## 2021-01-04 NOTE — LETTER
January 4, 2021     Alex Bird MD  [address] [facility] [address]    Patient: Oscar Goff   YOB: 1950   Date of Visit: 1/4/2021       Dear Dr Liborio Yepez: Thank you for referring Oscar Goff to me for evaluation  Below are my notes for this consultation  If you have questions, please do not hesitate to call me  I look forward to following your patient along with you  Sincerely,        Joleen Lipscomb MD        CC: No Recipients  Joleen Lipscomb MD  1/4/2021  5:43 PM  Sign when Signing Visit  Consultation - [address] [facility]  Oscar Goff 65 y o  male MRN: [id number]          Assessment & Plan:    49-year-old gentleman recently presented to the hospital with change in bowel movements, epigastric discomfort, CT scan demonstrated pancreatitis on 2 CT scan with normal lipase, normal ultrasound  1  Epigastric pain and change in bowel movements:  Strong suspect idiopathic pancreatitis associated with ileus  -continue low-fat diet  -we will repeat laboratory studies including BMP given patient's hypokalemia during recent hospital stay  -we will check an MRI/MRCP in 6 weeks to exclude underlying primary pancreatic pathology  -biliary source and alcohol have been excluded    2  Abdominal pain, change in bowel movements and anemia:  -we will proceed with an upper endoscopy and colonoscopy to exclude significant peptic ulcer disease, GI malignancy  -discussed with him the risks of the procedures including bleeding, surgery, perforation            _____________________________________________________________        CC:  Abdominal discomfort and change in bowel movements    HPI:  Oscar Goff is a 65 y o male who was referred for evaluation of abdominal discomfort, change in bowel movements  This is an otherwise healthy 49-year-old gentleman, recently moved [place] the [place] from [facility]    Approximately 1 week ago patient developed abdominal discomfort, after eating beans at home  Patient was not able to eat and did not have a bowel moved for 4 days due to increasing epigastric pain presented to the hospital where he was found to have a CT scan consistent with pancreatitis with normal lipase, questionable ileus  Symptoms gradually resolved over the next 2 days, ultrasound was negative  Patient denies any alcohol, denies any tobacco     In general prior to this patient reports having normal twice daily bowel movements daily  He denies any melena, rectal bleeding, nausea, vomiting  Had 1 episode of vomiting when his symptoms 1st started a week prior  No new medications have been started prior to this  No significant NSAID use  Patient has otherwise been quite healthy and active  Continues to have significant dyspepsia and some belching  Also reports continued incomplete evacuation symptoms though he has been on MiraLax since discharge  He was also recently started on antihypertensive medications and lipid lowering agents during his hospital course  Lipid lowering agents during his hospital course  ROS:  The remainder of the ROS was negative except for the pertinent positives mentioned in HPI  Allergies: Patient has no known allergies      Medications:   Current Outpatient Medications:     amLODIPine (NORVASC) 5 mg tablet, Take 1 tablet (5 mg total) by mouth 2 (two) times a day, Disp: 60 tablet, Rfl: 0    aspirin (ECOTRIN LOW STRENGTH) 81 mg EC tablet, Take 1 tablet (81 mg total) by mouth daily, Disp: 30 tablet, Rfl: 0    atorvastatin (LIPITOR) 10 mg tablet, Take 1 tablet (10 mg total) by mouth daily with dinner, Disp: 30 tablet, Rfl: 0    docusate sodium (COLACE) 100 mg capsule, Take 1 capsule (100 mg total) by mouth 2 (two) times a day, Disp: 10 capsule, Rfl: 0    escitalopram (LEXAPRO) 5 mg tablet, Take 1 tablet (5 mg total) by mouth daily, Disp: 30 tablet, Rfl: 0    labetalol (NORMODYNE) 200 mg tablet, Take 1 tablet (200 mg total) by mouth every 12 (twelve) hours, Disp: 60 tablet, Rfl: 0    losartan (COZAAR) 50 mg tablet, Take 1 tablet (50 mg total) by mouth 2 (two) times a day, Disp: 60 tablet, Rfl: 0    polyethylene glycol (MIRALAX) 17 g packet, Take 17 g by mouth daily as needed (constipation) for up to 14 days, Disp: 14 each, Rfl: 0    GaviLAX 17 GM/SCOOP powder, , Disp: , Rfl:     Na Sulfate-K Sulfate-Mg Sulf (Suprep Bowel Prep Kit) 17 5-3 13-1 6 GM/177ML SOLN, Take 1 Bottle by mouth once for 1 dose, Disp: 1 Bottle, Rfl: 0'    Past Medical History:   Diagnosis Date    Anxiety     Arthritis     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 ([facility] [address] )     Hypertension        Past Surgical History:   Procedure Laterality Date    HERNIA REPAIR         History reviewed  No pertinent family history  reports that he has never smoked  He has never used smokeless tobacco  He reports that he does not drink alcohol or use drugs            Physical Exam:     /74 (BP Location: Left arm, Patient Position: Sitting, Cuff Size: Standard)   Temp (!) 96 1 °F (35 6 °C) (Temporal)   Ht 6' 2" (1 88 m)   Wt 79 6 kg (175 lb 6 4 oz)   BMI 22 52 kg/m²     Gen: wn/wd, NAD  HEENT: anicteric, MMM, no cervical LAD  CVS: RRR, no m/r/g  CHEST: CTA b/l  ABD: +BS, soft, reproducible epigastric pain, no hepatosplenomegaly  EXT: no c/c/e  NEURO: aaox3  SKIN: NO rashes

## 2021-01-04 NOTE — PROGRESS NOTES
Consultation - [facility]  Carey Rust 65 y o  male MRN: [id number]          Assessment & Plan:    68-year-old gentleman recently presented to the hospital with change in bowel movements, epigastric discomfort, CT scan demonstrated pancreatitis on 2 CT scan with normal lipase, normal ultrasound  1  Epigastric pain and change in bowel movements:  Strong suspect idiopathic pancreatitis associated with ileus  -continue low-fat diet  -we will repeat laboratory studies including BMP given patient's hypokalemia during recent hospital stay  -we will check an MRI/MRCP in 6 weeks to exclude underlying primary pancreatic pathology  -biliary source and alcohol have been excluded    2  Abdominal pain, change in bowel movements and anemia:  -we will proceed with an upper endoscopy and colonoscopy to exclude significant peptic ulcer disease, GI malignancy  -discussed with him the risks of the procedures including bleeding, surgery, perforation            _____________________________________________________________        CC:  Abdominal discomfort and change in bowel movements    HPI:  Carey Rust is a 65 y o male who was referred for evaluation of abdominal discomfort, change in bowel movements  This is an otherwise healthy 68-year-old gentleman, recently moved to the [place] from [facility]  Approximately 1 week ago patient developed abdominal discomfort, after eating beans at home  Patient was not able to eat and did not have a bowel moved for 4 days due to increasing epigastric pain presented to the hospital where he was found to have a CT scan consistent with pancreatitis with normal lipase, questionable ileus  Symptoms gradually resolved over the next 2 days, ultrasound was negative  Patient denies any alcohol, denies any tobacco     In general prior to this patient reports having normal twice daily bowel movements daily  He denies any melena, rectal bleeding, nausea, vomiting    Had 1 episode of vomiting when his symptoms 1st started a week prior  No new medications have been started prior to this  No significant NSAID use  Patient has otherwise been quite healthy and active  Continues to have significant dyspepsia and some belching  Also reports continued incomplete evacuation symptoms though he has been on MiraLax since discharge  He was also recently started on antihypertensive medications and lipid lowering agents during his hospital course  Lipid lowering agents during his hospital course  ROS:  The remainder of the ROS was negative except for the pertinent positives mentioned in HPI  Allergies: Patient has no known allergies      Medications:   Current Outpatient Medications:     amLODIPine (NORVASC) 5 mg tablet, Take 1 tablet (5 mg total) by mouth 2 (two) times a day, Disp: 60 tablet, Rfl: 0    aspirin (ECOTRIN LOW STRENGTH) 81 mg EC tablet, Take 1 tablet (81 mg total) by mouth daily, Disp: 30 tablet, Rfl: 0    atorvastatin (LIPITOR) 10 mg tablet, Take 1 tablet (10 mg total) by mouth daily with dinner, Disp: 30 tablet, Rfl: 0    docusate sodium (COLACE) 100 mg capsule, Take 1 capsule (100 mg total) by mouth 2 (two) times a day, Disp: 10 capsule, Rfl: 0    escitalopram (LEXAPRO) 5 mg tablet, Take 1 tablet (5 mg total) by mouth daily, Disp: 30 tablet, Rfl: 0    labetalol (NORMODYNE) 200 mg tablet, Take 1 tablet (200 mg total) by mouth every 12 (twelve) hours, Disp: 60 tablet, Rfl: 0    losartan (COZAAR) 50 mg tablet, Take 1 tablet (50 mg total) by mouth 2 (two) times a day, Disp: 60 tablet, Rfl: 0    polyethylene glycol (MIRALAX) 17 g packet, Take 17 g by mouth daily as needed (constipation) for up to 14 days, Disp: 14 each, Rfl: 0    GaviLAX 17 GM/SCOOP powder, , Disp: , Rfl:     Na Sulfate-K Sulfate-Mg Sulf (Suprep Bowel Prep Kit) 17 5-3 13-1 6 GM/177ML SOLN, Take 1 Bottle by mouth once for 1 dose, Disp: 1 Bottle, Rfl: 0'    Past Medical History:   Diagnosis Date    Anxiety     Arthritis     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 ([facility] [address] )     Hypertension        Past Surgical History:   Procedure Laterality Date    HERNIA REPAIR         History reviewed  No pertinent family history  reports that he has never smoked  He has never used smokeless tobacco  He reports that he does not drink alcohol or use drugs            Physical Exam:     /74 (BP Location: Left arm, Patient Position: Sitting, Cuff Size: Standard)   Temp (!) 96 1 °F (35 6 °C) (Temporal)   Ht 6' 2" (1 88 m)   Wt 79 6 kg (175 lb 6 4 oz)   BMI 22 52 kg/m²     Gen: wn/wd, NAD  HEENT: anicteric, MMM, no cervical LAD  CVS: RRR, no m/r/g  CHEST: CTA b/l  ABD: +BS, soft, reproducible epigastric pain, no hepatosplenomegaly  EXT: no c/c/e  NEURO: aaox3  SKIN: NO rashes

## 2021-01-08 ENCOUNTER — TELEPHONE (OUTPATIENT)
Dept: GASTROENTEROLOGY | Facility: AMBULARY SURGERY CENTER | Age: 71
End: 2021-01-08

## 2021-01-08 NOTE — TELEPHONE ENCOUNTER
Patients GI provider:  Dr Laxmi Art     Number to return call: ( [phone]    Reason for call: Pt calling to schedule his colon     Scheduled procedure/appointment date if applicable: Apt/procedure   NA

## 2021-01-13 NOTE — TELEPHONE ENCOUNTER
Spoke with daughter, pt's current insurance we are not Par with  He is to change to [facility]  Once completed pt will call back to schedule   Given direct number to call

## 2021-01-19 NOTE — TELEPHONE ENCOUNTER
Patient's daughter contacted the office stating they will be changing the insurance to [employer] Kulkarni so his colonoscopy can be covered     She would like to at least schedule the Colonoscopy for mid to late February so he at least has a date     Can you please assist patient? Thank you!

## 2021-01-20 ENCOUNTER — DOCUMENTATION (OUTPATIENT)
Dept: INPATIENT UNIT | Facility: HOSPITAL | Age: 71
End: 2021-01-20

## 2021-01-20 LAB
BASOPHILS # BLD AUTO: 0.1 X10E3/UL (ref 0–0.2)
BASOPHILS NFR BLD AUTO: 1 %
BUN SERPL-MCNC: 13 MG/DL (ref 8–27)
BUN/CREAT SERPL: 12 (ref 10–24)
CALCIUM SERPL-MCNC: 9.4 MG/DL (ref 8.6–10.2)
CHLORIDE SERPL-SCNC: 99 MMOL/L (ref 96–106)
CO2 SERPL-SCNC: 25 MMOL/L (ref 20–29)
CREAT SERPL-MCNC: 1.1 MG/DL (ref 0.76–1.27)
EOSINOPHIL # BLD AUTO: 0.1 X10E3/UL (ref 0–0.4)
EOSINOPHIL NFR BLD AUTO: 1 %
ERYTHROCYTE [DISTWIDTH] IN BLOOD BY AUTOMATED COUNT: 13.5 % (ref 11.6–15.4)
GLUCOSE SERPL-MCNC: 92 MG/DL (ref 65–99)
HCT VFR BLD AUTO: 35.5 % (ref 37.5–51)
HGB BLD-MCNC: 11.8 G/DL (ref 13–17.7)
IMM GRANULOCYTES # BLD: 0 X10E3/UL (ref 0–0.1)
IMM GRANULOCYTES NFR BLD: 0 %
LYMPHOCYTES # BLD AUTO: 0.8 X10E3/UL (ref 0.7–3.1)
LYMPHOCYTES NFR BLD AUTO: 15 %
MCH RBC QN AUTO: 28.1 PG (ref 26.6–33)
MCHC RBC AUTO-ENTMCNC: 33.2 G/DL (ref 31.5–35.7)
MCV RBC AUTO: 85 FL (ref 79–97)
MONOCYTES # BLD AUTO: 0.9 X10E3/UL (ref 0.1–0.9)
MONOCYTES NFR BLD AUTO: 17 %
NEUTROPHILS # BLD AUTO: 3.6 X10E3/UL (ref 1.4–7)
NEUTROPHILS NFR BLD AUTO: 66 %
PLATELET # BLD AUTO: 216 X10E3/UL (ref 150–450)
POTASSIUM SERPL-SCNC: 4.2 MMOL/L (ref 3.5–5.2)
RBC # BLD AUTO: 4.2 X10E6/UL (ref 4.14–5.8)
SL AMB EGFR AFRICAN AMERICAN: 77 ML/MIN/1.73
SL AMB EGFR NON AFRICAN AMERICAN: 67 ML/MIN/1.73
SODIUM SERPL-SCNC: 137 MMOL/L (ref 134–144)
TSH SERPL DL<=0.005 MIU/L-ACNC: 3.74 UIU/ML (ref 0.45–4.5)
WBC # BLD AUTO: 5.5 X10E3/UL (ref 3.4–10.8)

## 2021-01-22 ENCOUNTER — OFFICE VISIT (OUTPATIENT)
Dept: FAMILY MEDICINE CLINIC | Facility: CLINIC | Age: 71
End: 2021-01-22
Payer: MEDICAID

## 2021-01-22 ENCOUNTER — PATIENT OUTREACH (OUTPATIENT)
Dept: FAMILY MEDICINE CLINIC | Facility: CLINIC | Age: 71
End: 2021-01-22

## 2021-01-22 VITALS
HEART RATE: 68 BPM | TEMPERATURE: 97.9 F | BODY MASS INDEX: 22.2 KG/M2 | WEIGHT: 173 LBS | HEIGHT: 74 IN | DIASTOLIC BLOOD PRESSURE: 52 MMHG | SYSTOLIC BLOOD PRESSURE: 106 MMHG | OXYGEN SATURATION: 98 %

## 2021-01-22 DIAGNOSIS — Z78.9 NEED FOR FOLLOW-UP BY SOCIAL WORKER: ICD-10-CM

## 2021-01-22 DIAGNOSIS — R32 URINARY INCONTINENCE, UNSPECIFIED TYPE: ICD-10-CM

## 2021-01-22 DIAGNOSIS — F32.A DEPRESSION: ICD-10-CM

## 2021-01-22 DIAGNOSIS — M25.561 CHRONIC PAIN OF BOTH KNEES: ICD-10-CM

## 2021-01-22 DIAGNOSIS — M25.562 CHRONIC PAIN OF BOTH KNEES: ICD-10-CM

## 2021-01-22 DIAGNOSIS — G89.29 CHRONIC PAIN OF BOTH KNEES: ICD-10-CM

## 2021-01-22 DIAGNOSIS — S01.90XD: Primary | ICD-10-CM

## 2021-01-22 DIAGNOSIS — S06.300D: Primary | ICD-10-CM

## 2021-01-22 DIAGNOSIS — I16.0 HYPERTENSIVE URGENCY: ICD-10-CM

## 2021-01-22 DIAGNOSIS — Z86.73 HISTORY OF HEMORRHAGIC CEREBROVASCULAR ACCIDENT (CVA) WITHOUT RESIDUAL DEFICITS: ICD-10-CM

## 2021-01-22 DIAGNOSIS — K56.600 PARTIAL SMALL BOWEL OBSTRUCTION (HCC): ICD-10-CM

## 2021-01-22 PROCEDURE — 99397 PER PM REEVAL EST PAT 65+ YR: CPT | Performed by: FAMILY MEDICINE

## 2021-01-22 PROCEDURE — 3288F FALL RISK ASSESSMENT DOCD: CPT | Performed by: FAMILY MEDICINE

## 2021-01-22 PROCEDURE — 1160F RVW MEDS BY RX/DR IN RCRD: CPT | Performed by: FAMILY MEDICINE

## 2021-01-22 PROCEDURE — 1101F PT FALLS ASSESS-DOCD LE1/YR: CPT | Performed by: FAMILY MEDICINE

## 2021-01-22 PROCEDURE — 3008F BODY MASS INDEX DOCD: CPT | Performed by: FAMILY MEDICINE

## 2021-01-22 RX ORDER — LABETALOL 200 MG/1
200 TABLET, FILM COATED ORAL EVERY 12 HOURS SCHEDULED
Qty: 60 TABLET | Refills: 0 | Status: SHIPPED | OUTPATIENT
Start: 2021-01-22 | End: 2021-02-26 | Stop reason: SDUPTHER

## 2021-01-22 RX ORDER — ASPIRIN 81 MG/1
81 TABLET ORAL DAILY
Qty: 30 TABLET | Refills: 0 | Status: SHIPPED | OUTPATIENT
Start: 2021-01-22 | End: 2021-02-26 | Stop reason: SDUPTHER

## 2021-01-22 RX ORDER — POLYETHYLENE GLYCOL 3350 17 G/17G
17 POWDER, FOR SOLUTION ORAL DAILY PRN
Qty: 14 EACH | Refills: 0 | Status: SHIPPED | OUTPATIENT
Start: 2021-01-22 | End: 2021-03-23 | Stop reason: SDUPTHER

## 2021-01-22 RX ORDER — AMLODIPINE BESYLATE 5 MG/1
5 TABLET ORAL 2 TIMES DAILY
Qty: 60 TABLET | Refills: 0 | Status: SHIPPED | OUTPATIENT
Start: 2021-01-22 | End: 2021-02-26 | Stop reason: SDUPTHER

## 2021-01-22 RX ORDER — LOSARTAN POTASSIUM 50 MG/1
50 TABLET ORAL 2 TIMES DAILY
Qty: 60 TABLET | Refills: 0 | Status: SHIPPED | OUTPATIENT
Start: 2021-01-22 | End: 2021-02-26 | Stop reason: SDUPTHER

## 2021-01-22 RX ORDER — ESCITALOPRAM OXALATE 5 MG/1
5 TABLET ORAL DAILY
Qty: 30 TABLET | Refills: 0 | Status: SHIPPED | OUTPATIENT
Start: 2021-01-22 | End: 2021-02-26 | Stop reason: SDUPTHER

## 2021-01-22 RX ORDER — DOCUSATE SODIUM 100 MG/1
100 CAPSULE, LIQUID FILLED ORAL 2 TIMES DAILY
Qty: 10 CAPSULE | Refills: 0 | Status: SHIPPED | OUTPATIENT
Start: 2021-01-22 | End: 2021-06-16 | Stop reason: ALTCHOICE

## 2021-01-22 RX ORDER — ATORVASTATIN CALCIUM 10 MG/1
10 TABLET, FILM COATED ORAL
Qty: 30 TABLET | Refills: 0 | Status: SHIPPED | OUTPATIENT
Start: 2021-01-22 | End: 2021-02-26 | Stop reason: SDUPTHER

## 2021-01-22 NOTE — PROGRESS NOTES
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 and no loss of consciousness, subsequent encounter  - patient with history of aforementioned, not yet established with neurologist in the area  - Ambulatory referral to Neurology; Future    2  Chronic pain of both knees    - Ambulatory referral to Orthopedic Surgery; Future    3  Need for follow-up by     - Ambulatory referral to social work care management program; Future  -daughter reports that she would appreciate some support as far as insurance and coverage    4  HTN    - amLODIPine (NORVASC) 5 mg tablet; Take 1 tablet (5 mg total) by mouth 2 (two) times a day  Dispense: 60 tablet; Refill: 0  - labetalol (NORMODYNE) 200 mg tablet; Take 1 tablet (200 mg total) by mouth every 12 (twelve) hours  Dispense: 60 tablet; Refill: 0  - losartan (COZAAR) 50 mg tablet; Take 1 tablet (50 mg total) by mouth 2 (two) times a day  Dispense: 60 tablet; Refill: 0  -patient recently hospitalized, new medications started as far as his antihypertensives  -blood pressure today 106/52, follow-up blood pressure at next visit and reassess appropriateness to continue of all antihypertensives  - discussed signs and symptoms of hypotension in which case they should seek medical attention, expressed understanding    5  History of hemorrhagic cerebrovascular accident (CVA) without residual deficits    - aspirin (ECOTRIN LOW STRENGTH) 81 mg EC tablet; Take 1 tablet (81 mg total) by mouth daily  Dispense: 30 tablet; Refill: 0  - atorvastatin (LIPITOR) 10 mg tablet; Take 1 tablet (10 mg total) by mouth daily with dinner  Dispense: 30 tablet; Refill: 0  -to establish with neurologist      7  Depression    - escitalopram (LEXAPRO) 5 mg tablet;  Take 1 tablet (5 mg total) by mouth daily  Dispense: 30 tablet; Refill: 0    8  Urinary incontinence, unspecified type  -patient advised to follow-up in office for evaluation of this complaint, he reports episodes of urinary incontinence usually at nighttime      9  Hx Partial small bowel obstruction ([facility] [address] )  - patient recently hospitalized for partial small-bowel obstruction in December  -following with Dr  Jayson Call    Patient counseled about indication for tetanus vaccine and Pneumovax  Patient not amenable to receive vaccinations at this visit however states he will at next visit  Amanuel Hager acknowledged understanding of plan, all questions answered  Plan discussed with attending physician Dr Adamaris Cash  Subjective      Amanuel Hager is a 65 y o  male, presenting today for wellness exam     Current concerns:    Occasional episodes of urinary incontinence usually at night  Denies any hematuria or dysuria  Also reports longstanding history of bilateral knee pain  Diet & Exercise   Regular consumption of fruits & vegetables   Regular consumption of proteins   Vegetarian   Junk food/fast food limited   Weekly Exercise: walks   BMI: Body mass index is 22 21 kg/m²      Social   Marital Status:    Household Members: 7  Employment Status: retired former principal   Tobacco Use: no  Alcohol Use: no  Drug Use: no    Reproductive Health     Concerns: urinary incontinence     Screening & Preventative   Colon Cancer: working on scheduling       Immunizations     Immunization History   Administered Date(s) Administered    Influenza, high dose seasonal 0 7 mL 12/25/2020     Tdap: next visit   PPSV23: next visit   Zoster vaccine: counseled     Past Medical History:   Diagnosis Date    Anxiety     Arthritis     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 ([facility] )     Hypertension        Past Surgical History:   Procedure Laterality Date    HERNIA REPAIR         Family History   Problem Relation Age of Onset    Stroke Mother     Stroke Father        Current Outpatient Medications   Medication Sig Dispense Refill    amLODIPine (NORVASC) 5 mg tablet Take 1 tablet (5 mg total) by mouth 2 (two) times a day 60 tablet 0    aspirin (ECOTRIN LOW STRENGTH) 81 mg EC tablet Take 1 tablet (81 mg total) by mouth daily 30 tablet 0    atorvastatin (LIPITOR) 10 mg tablet Take 1 tablet (10 mg total) by mouth daily with dinner 30 tablet 0    docusate sodium (COLACE) 100 mg capsule Take 1 capsule (100 mg total) by mouth 2 (two) times a day 10 capsule 0    escitalopram (LEXAPRO) 5 mg tablet Take 1 tablet (5 mg total) by mouth daily 30 tablet 0    GaviLAX 17 GM/SCOOP powder       labetalol (NORMODYNE) 200 mg tablet Take 1 tablet (200 mg total) by mouth every 12 (twelve) hours 60 tablet 0    losartan (COZAAR) 50 mg tablet Take 1 tablet (50 mg total) by mouth 2 (two) times a day 60 tablet 0    Na Sulfate-K Sulfate-Mg Sulf (Suprep Bowel Prep Kit) 17 5-3 13-1 6 GM/177ML SOLN Take 1 Bottle by mouth once for 1 dose 1 Bottle 0    polyethylene glycol (MIRALAX) 17 g packet Take 17 g by mouth daily as needed (constipation) for up to 14 days 14 each 0     No current facility-administered medications for this visit  No Known Allergies    Review of Systems   Review of Systems      As noted in HPI    The following portions of the patient's history were reviewed and updated as appropriate: allergies, current medications, past family history, past medical history, past social history, past surgical history and problem list     Objective      /52   Pulse 68   Temp 97 9 °F (36 6 °C) (Tympanic)   Ht 6' 2" (1 88 m)   Wt 78 5 kg (173 lb)   SpO2 98%   BMI 22 21 kg/m²     Physical Exam  Constitutional:       General: He is not in acute distress  Appearance: Normal appearance  He is normal weight  He is not ill-appearing, toxic-appearing or diaphoretic  HENT:      Head: Normocephalic and atraumatic        Right Ear: External ear normal       Left Ear: External ear normal       Nose: Nose normal    Eyes:      Extraocular Movements: Extraocular movements intact  Conjunctiva/sclera: Conjunctivae normal       Pupils: Pupils are equal, round, and reactive to light  Cardiovascular:      Rate and Rhythm: Normal rate and regular rhythm  Pulses: Normal pulses  Heart sounds: Normal heart sounds  Pulmonary:      Effort: Pulmonary effort is normal       Breath sounds: Normal breath sounds  Abdominal:      General: Abdomen is flat  Bowel sounds are normal       Palpations: Abdomen is soft  Tenderness: There is no abdominal tenderness  There is no guarding or rebound  Skin:     General: Skin is warm and dry  Neurological:      Mental Status: He is alert and oriented to person, place, and time  Psychiatric:         Behavior: Behavior normal            Some portions of this record may have been generated with voice recognition software  There may be translation, syntax, or grammatical errors  Occasional wrong word or "sound-a-like" substitutions may have occurred due to the inherent limitations of the voice recognition software  Read the chart carefully and recognize, using context, where substations may have occurred  If you have any questions, please contact the dictating provider for clarification or correction, as needed

## 2021-01-26 ENCOUNTER — PATIENT OUTREACH (OUTPATIENT)
Dept: FAMILY MEDICINE CLINIC | Facility: CLINIC | Age: 71
End: 2021-01-26

## 2021-01-26 DIAGNOSIS — D64.9 NORMOCYTIC ANEMIA: Primary | ICD-10-CM

## 2021-01-26 NOTE — PROGRESS NOTES
MARIELA BOSWELL received referral from provider, Dr Reece Rose  Per Dr Reece Rose, patient's daughter Radhika Adam is fluent in [address]  Per Dr America Weiss had questions regarding patient's insurance coverage  MARIELA BOSWELL called Radhika Adam ([phone]), introduced MARIELA BOSWELL role and completed assessment  MARIELA BOSWELL confirmed address and contact information  Per [facility], patient lives with his wife, Kerwin Augustine  and Saqib's father in law   [employer] stated that patient is able to perform all ADLs independently  However, unruly Solitario stated that he does not drive   "[employer] and  transport as needed  Patient does not have any income, per [facility] her and her  provide financially for patient  [facility] that patient has a [facility] card and has been [place]  [facility] stated that her family and patient has enough food at home  Patient does not receive any government assistance  [facility] asked if patient would be able to receive any income, MARIELA BOSWELL told Pastor Solitario that she could call the social security office in [place], [place] to inquire regarding  [facility] had questions regarding patient's insurance  Per [facility], she was interested in changing patient's insurance ( [employer]) to [place] [employer] CM informed [facility] that she would need to call [facility] (medicaid) to switch plans  MARIELA BOSWELL provided [facility] with contact information for [facility] had no other questions, needs or concerns  MARIELA BOSWELL provided [facility] with MARIELA  contact information for any questions, concerns, or needs  MARIELA BOSWELL will continue to be available for ongoing consults and support

## 2021-02-10 ENCOUNTER — TELEPHONE (OUTPATIENT)
Dept: FAMILY MEDICINE CLINIC | Facility: CLINIC | Age: 71
End: 2021-02-10

## 2021-02-10 NOTE — TELEPHONE ENCOUNTER
Spoke to daughter that pt needs to return for visit to address urinary incontinence  Also need to re-assess BP  She states she will schedule appts and keep BP log  Pt also needs to f/u ferritin, hx normocytic anemia

## 2021-02-16 ENCOUNTER — TELEPHONE (OUTPATIENT)
Dept: OBGYN CLINIC | Facility: HOSPITAL | Age: 71
End: 2021-02-16

## 2021-02-16 NOTE — TELEPHONE ENCOUNTER
Patients daughter is calling in stating that they had an issue before with us not accepting his insurance she had switched him to [employer] I advised an updated referral would be needed she is going to obtain this and call back

## 2021-02-17 ENCOUNTER — HOSPITAL ENCOUNTER (OUTPATIENT)
Dept: RADIOLOGY | Facility: HOSPITAL | Age: 71
Discharge: HOME | End: 2021-02-17
Attending: INTERNAL MEDICINE
Payer: MEDICAID

## 2021-02-17 DIAGNOSIS — R93.5 ABNORMAL CT OF THE ABDOMEN: ICD-10-CM

## 2021-02-17 PROCEDURE — G1004 CDSM NDSC: HCPCS

## 2021-02-17 PROCEDURE — A9585 GADOBUTROL INJECTION: HCPCS | Performed by: INTERNAL MEDICINE

## 2021-02-17 PROCEDURE — 74183 MRI ABD W/O CNTR FLWD CNTR: CPT

## 2021-02-17 RX ADMIN — GADOBUTROL 8 ML: 604.72 INJECTION INTRAVENOUS at 10:53

## 2021-02-23 ENCOUNTER — IMMUNIZATIONS (OUTPATIENT)
Dept: FAMILY MEDICINE CLINIC | Facility: HOSPITAL | Age: 71
End: 2021-02-23

## 2021-02-23 DIAGNOSIS — Z23 ENCOUNTER FOR IMMUNIZATION: Primary | ICD-10-CM

## 2021-02-23 PROCEDURE — 0011A SARS-COV-2 / COVID-19 MRNA VACCINE (MODERNA) 100 MCG: CPT

## 2021-02-23 PROCEDURE — 91301 SARS-COV-2 / COVID-19 MRNA VACCINE (MODERNA) 100 MCG: CPT

## 2021-02-26 ENCOUNTER — TELEPHONE (OUTPATIENT)
Dept: FAMILY MEDICINE CLINIC | Facility: CLINIC | Age: 71
End: 2021-02-26

## 2021-02-26 DIAGNOSIS — Z86.73 HISTORY OF HEMORRHAGIC CEREBROVASCULAR ACCIDENT (CVA) WITHOUT RESIDUAL DEFICITS: ICD-10-CM

## 2021-02-26 DIAGNOSIS — I16.0 HYPERTENSIVE URGENCY: ICD-10-CM

## 2021-02-26 DIAGNOSIS — K56.600 PARTIAL SMALL BOWEL OBSTRUCTION (HCC): ICD-10-CM

## 2021-02-26 DIAGNOSIS — F32.A DEPRESSION: ICD-10-CM

## 2021-02-26 RX ORDER — ESCITALOPRAM OXALATE 5 MG/1
5 TABLET ORAL DAILY
Qty: 30 TABLET | Refills: 0 | Status: SHIPPED | OUTPATIENT
Start: 2021-02-26 | End: 2021-03-23 | Stop reason: SDUPTHER

## 2021-02-26 RX ORDER — ASPIRIN 81 MG/1
81 TABLET ORAL DAILY
Qty: 30 TABLET | Refills: 0 | Status: SHIPPED | OUTPATIENT
Start: 2021-02-26 | End: 2021-04-06 | Stop reason: HOSPADM

## 2021-02-26 RX ORDER — LOSARTAN POTASSIUM 50 MG/1
50 TABLET ORAL 2 TIMES DAILY
Qty: 60 TABLET | Refills: 0 | Status: SHIPPED | OUTPATIENT
Start: 2021-02-26 | End: 2021-03-23 | Stop reason: SDUPTHER

## 2021-02-26 RX ORDER — LABETALOL 200 MG/1
200 TABLET, FILM COATED ORAL EVERY 12 HOURS SCHEDULED
Qty: 60 TABLET | Refills: 0 | Status: SHIPPED | OUTPATIENT
Start: 2021-02-26 | End: 2021-03-23 | Stop reason: SDUPTHER

## 2021-02-26 RX ORDER — AMLODIPINE BESYLATE 5 MG/1
5 TABLET ORAL 2 TIMES DAILY
Qty: 60 TABLET | Refills: 0 | Status: SHIPPED | OUTPATIENT
Start: 2021-02-26 | End: 2021-03-23 | Stop reason: SDUPTHER

## 2021-02-26 RX ORDER — ATORVASTATIN CALCIUM 10 MG/1
10 TABLET, FILM COATED ORAL
Qty: 30 TABLET | Refills: 0 | Status: SHIPPED | OUTPATIENT
Start: 2021-02-26 | End: 2021-03-23 | Stop reason: SDUPTHER

## 2021-02-26 NOTE — TELEPHONE ENCOUNTER
Received message from daughter that patient requires refills  Also advised to return to office for blood pressure check and evaluation urinary incontinence

## 2021-03-02 ENCOUNTER — PATIENT OUTREACH (OUTPATIENT)
Dept: FAMILY MEDICINE CLINIC | Facility: CLINIC | Age: 71
End: 2021-03-02

## 2021-03-02 NOTE — PROGRESS NOTES
MARIELA BOSWELL called patient's daughter, Radhika Adam ([phone]) to follow up regarding insurance  Per Pastor Solitario, she was able to switch patient's insurance from [facility] to [facility], patient's chart reflects the same  [facility] had no other questions, concerns, or needs  Please consult MARIELA BOSWELL for any future needs

## 2021-03-04 ENCOUNTER — OFFICE VISIT (OUTPATIENT)
Dept: OBGYN CLINIC | Facility: CLINIC | Age: 71
End: 2021-03-04
Payer: COMMERCIAL

## 2021-03-04 ENCOUNTER — APPOINTMENT (OUTPATIENT)
Dept: RADIOLOGY | Facility: CLINIC | Age: 71
End: 2021-03-04
Payer: COMMERCIAL

## 2021-03-04 VITALS
SYSTOLIC BLOOD PRESSURE: 144 MMHG | WEIGHT: 177 LBS | DIASTOLIC BLOOD PRESSURE: 78 MMHG | HEIGHT: 72 IN | BODY MASS INDEX: 23.98 KG/M2 | HEART RATE: 66 BPM

## 2021-03-04 DIAGNOSIS — M25.562 PAIN IN BOTH KNEES, UNSPECIFIED CHRONICITY: ICD-10-CM

## 2021-03-04 DIAGNOSIS — G89.29 BILATERAL CHRONIC KNEE PAIN: ICD-10-CM

## 2021-03-04 DIAGNOSIS — M25.561 BILATERAL CHRONIC KNEE PAIN: ICD-10-CM

## 2021-03-04 DIAGNOSIS — M17.0 BILATERAL PRIMARY OSTEOARTHRITIS OF KNEE: Primary | ICD-10-CM

## 2021-03-04 DIAGNOSIS — M25.561 PAIN IN BOTH KNEES, UNSPECIFIED CHRONICITY: ICD-10-CM

## 2021-03-04 DIAGNOSIS — M25.562 BILATERAL CHRONIC KNEE PAIN: ICD-10-CM

## 2021-03-04 PROCEDURE — 73562 X-RAY EXAM OF KNEE 3: CPT

## 2021-03-04 PROCEDURE — 99203 OFFICE O/P NEW LOW 30 MIN: CPT | Performed by: ORTHOPAEDIC SURGERY

## 2021-03-04 NOTE — PROGRESS NOTES
Assessment/Plan:  1  Bilateral primary osteoarthritis of knee  XR knee 3 vw left non injury    XR knee 3 vw right non injury   2  Bilateral chronic knee pain       Scribe Attestation    I,:  Shaina Yusuf am acting as a scribe while in the presence of the attending physician :       I,:  Tia Brenner, DO personally performed the services described in this documentation    as scribed in my presence :         Liana Carver is a pleasant 77-year-old male who presents to the office today for initial evaluation of bilateral knee pain  He has been experiencing chronic bilateral knee pain for roughly 3 years with no mechanism of injury  After obtaining a thorough history, clinical examination and review of imaging, I do believe he is demonstrating signs and symptoms consistent with his end-stage osteoarthritis, bilaterally  Ultimately, I do believe a bilateral total knee replacement is warranted in the future  However, I did discuss with him and his family that family support is warranted as his wife requires a future knee replacement, as well  Additionally, if he would like a steroid injection, he would not be qualified for surgical intervention for at least 3 months due to the risk of infection  At this time, we did discuss going home and discussing his options with his family  His daughter will contact us under decision whether to proceed with a steroid injection or surgical intervention  Subjective: bilateral knee initial evaluation  Patient ID: Benjamín Valdez is a 65 y o  male  Liana Carver is a pleasant 77-year-old male who presents to the office today for initial evaluation of bilateral knee pain  His daughter is translating for him today  He states he has been experiencing pain for roughly 3 years with no mechanism of injury  He localizes the majority of his pain in his left knee along the medial aspect and in his right knee along the lateral aspect    He describes his pain as constant, achy, sore and moderate in intensity  He denies any history of surgery to either knee or interventions in the form of formal physical therapy or steroid injection  He denies any swelling about his knee  He has been taking Aceclofenac once a week for pain relief  He denies any numbness or tingling  He denies any radicualr symptoms  Review of Systems   Constitutional: Positive for activity change  Negative for chills, fever and unexpected weight change  HENT: Negative  Negative for hearing loss, nosebleeds and sore throat  Eyes: Negative  Negative for pain, redness and visual disturbance  Respiratory: Negative  Negative for cough, shortness of breath and wheezing  Cardiovascular: Negative  Negative for chest pain, palpitations and leg swelling  Gastrointestinal: Negative  Negative for abdominal pain, nausea and vomiting  Endocrine: Negative  Negative for polyphagia and polyuria  Genitourinary: Negative for dysuria and hematuria  Musculoskeletal: Positive for arthralgias  See HPI   Skin: Negative  Negative for rash and wound  Neurological: Negative  Negative for dizziness, numbness and headaches  Psychiatric/Behavioral: Negative  Negative for decreased concentration and suicidal ideas  The patient is not nervous/anxious            Past Medical History:   Diagnosis Date    Anxiety     Arthritis     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 ([facility] [address] )     Hypertension        Past Surgical History:   Procedure Laterality Date    HERNIA REPAIR         Family History   Problem Relation Age of Onset    Stroke Mother     Stroke Father        Social History     Occupational History    Not on file   Tobacco Use    Smoking status: Never Smoker    Smokeless tobacco: Never Used   Substance and Sexual Activity    Alcohol use: Never     Frequency: Never    Drug use: Never    Sexual activity: Not Currently         Current Outpatient Medications:     amLODIPine (NORVASC) 5 mg tablet, Take 1 tablet (5 mg total) by mouth 2 (two) times a day, Disp: 60 tablet, Rfl: 0    aspirin (ECOTRIN LOW STRENGTH) 81 mg EC tablet, Take 1 tablet (81 mg total) by mouth daily, Disp: 30 tablet, Rfl: 0    atorvastatin (LIPITOR) 10 mg tablet, Take 1 tablet (10 mg total) by mouth daily with dinner, Disp: 30 tablet, Rfl: 0    docusate sodium (COLACE) 100 mg capsule, Take 1 capsule (100 mg total) by mouth 2 (two) times a day, Disp: 10 capsule, Rfl: 0    escitalopram (LEXAPRO) 5 mg tablet, Take 1 tablet (5 mg total) by mouth daily, Disp: 30 tablet, Rfl: 0    GaviLAX 17 GM/SCOOP powder, , Disp: , Rfl:     labetalol (NORMODYNE) 200 mg tablet, Take 1 tablet (200 mg total) by mouth every 12 (twelve) hours, Disp: 60 tablet, Rfl: 0    losartan (COZAAR) 50 mg tablet, Take 1 tablet (50 mg total) by mouth 2 (two) times a day, Disp: 60 tablet, Rfl: 0    Na Sulfate-K Sulfate-Mg Sulf (Suprep Bowel Prep Kit) 17 5-3 13-1 6 GM/177ML SOLN, Take 1 Bottle by mouth once for 1 dose, Disp: 1 Bottle, Rfl: 0    polyethylene glycol (MIRALAX) 17 g packet, Take 17 g by mouth daily as needed (constipation) for up to 14 days, Disp: 14 each, Rfl: 0    No Known Allergies    Objective:  Vitals:    03/04/21 1516   BP: 144/78   Pulse: 66       Body mass index is 24 01 kg/m²  Right Knee Exam     Tenderness   The patient is experiencing tenderness in the medial joint line and lateral joint line  Range of Motion   Extension: 0   Flexion: 130     Tests   Varus: negative Valgus: negative  Lachman:  Anterior - negative      Drawer:  Anterior - negative      Patellar apprehension: negative    Other   Erythema: absent  Scars: absent  Sensation: normal  Pulse: present  Swelling: none  Effusion: effusion (popliteal) present    Comments:    10 degree varus deformity correctable to neurtral    crepitance on passive range of motion   neurovascular intact      Left Knee Exam     Tenderness   The patient is experiencing tenderness in the medial joint line and patella      Range of Motion   Extension: 0   Flexion: 130     Tests   Varus: negative Valgus: negative  Lachman:  Anterior - negative      Drawer:  Anterior - negative       Patellar apprehension: negative    Other   Erythema: absent  Scars: absent  Sensation: normal  Pulse: present  Swelling: none  Effusion: effusion (Popliteal) present    Comments:    10 degree varus deformity correctable to neurtral     Crepitance on passive range of motion   neurovascular intact          Observations   Left Knee   Positive for effusion (Popliteal)  Right Knee   Positive for effusion (popliteal)  Physical Exam  Vitals signs and nursing note reviewed  Constitutional:       Appearance: Normal appearance  He is well-developed  HENT:      Head: Normocephalic and atraumatic  Eyes:      General: No scleral icterus  Extraocular Movements: Extraocular movements intact  Conjunctiva/sclera: Conjunctivae normal    Neck:      Musculoskeletal: Normal range of motion and neck supple  Cardiovascular:      Rate and Rhythm: Normal rate  Pulmonary:      Effort: Pulmonary effort is normal  No respiratory distress  Musculoskeletal:      Right knee: He exhibits effusion (popliteal)  Left knee: He exhibits effusion (Popliteal)  Comments: As noted in HPI   Skin:     General: Skin is warm and dry  Neurological:      General: No focal deficit present  Mental Status: He is alert and oriented to person, place, and time  Psychiatric:         Mood and Affect: Mood normal          Behavior: Behavior normal          I have personally reviewed pertinent films in PACS  X-rays of bilateral knees obtained on 03/04/2021 demonstrate end stage osteoarthritis evident by severe joint line narrowing, sclerosis, osteophyte formation and tibial subluxation  With the severity of his osteoarthritis it appears that he is eroding his proximal medial tibia  No lytic or blastic lesion  No fracture appreciated

## 2021-03-05 ENCOUNTER — OFFICE VISIT (OUTPATIENT)
Dept: FAMILY MEDICINE CLINIC | Facility: CLINIC | Age: 71
End: 2021-03-05
Payer: COMMERCIAL

## 2021-03-05 VITALS
SYSTOLIC BLOOD PRESSURE: 142 MMHG | OXYGEN SATURATION: 98 % | TEMPERATURE: 98.7 F | DIASTOLIC BLOOD PRESSURE: 84 MMHG | WEIGHT: 173.9 LBS | HEIGHT: 74 IN | BODY MASS INDEX: 22.32 KG/M2 | RESPIRATION RATE: 18 BRPM | HEART RATE: 69 BPM

## 2021-03-05 DIAGNOSIS — Z87.898 HISTORY OF NOCTURIA: Primary | ICD-10-CM

## 2021-03-05 DIAGNOSIS — G47.00 INSOMNIA, UNSPECIFIED TYPE: ICD-10-CM

## 2021-03-05 LAB
SL AMB  POCT GLUCOSE, UA: NORMAL
SL AMB LEUKOCYTE ESTERASE,UA: 25
SL AMB POCT BILIRUBIN,UA: 1
SL AMB POCT BLOOD,UA: NORMAL
SL AMB POCT CLARITY,UA: CLEAR
SL AMB POCT COLOR,UA: YELLOW
SL AMB POCT KETONES,UA: NORMAL
SL AMB POCT NITRITE,UA: NORMAL
SL AMB POCT PH,UA: 6
SL AMB POCT SPECIFIC GRAVITY,UA: 1.01
SL AMB POCT URINE PROTEIN: NORMAL
SL AMB POCT UROBILINOGEN: 1

## 2021-03-05 PROCEDURE — 99213 OFFICE O/P EST LOW 20 MIN: CPT | Performed by: FAMILY MEDICINE

## 2021-03-05 PROCEDURE — 81002 URINALYSIS NONAUTO W/O SCOPE: CPT | Performed by: FAMILY MEDICINE

## 2021-03-05 NOTE — PROGRESS NOTES
Assessment/Plan:    History of nocturia  68-year-old male with past medical history of HTN, CVA (without residual deficit) who is presenting today with complaints of occasional nocturia likely 2/2 sleep disorder versus possible anxiety states versus BPH (however unlikely given no history of prostatism and normal prostate findings on BECKI  given age)  Patient at this time is clinically stable and in no acute distress    · Patient counseled on the need to decrease caffeine intake (skipping evening caffeine/Tea)  · Patient advised on using melatonin 5 mg 30-60 minutes before hour Sleep to help regularize sleep pattern (patient endorses no nocturia when he sleeps through the night)  · Patient also advised to decrease fluid intake close to hour of Sleep  · Will follow-up patient in 3 weeks to review progress  Diagnoses and all orders for this visit:    History of nocturia  -     POCT urine dip    Insomnia, unspecified type  -     Melatonin 5 MG CAPS; Take 1 capsule (5 mg total) by mouth daily at bedtime          Subjective:      Patient ID: Audie Zamudio is a 65 y o  male  68-year-old male with a past medical history of HTN, CVA (without residual deficits) who is presenting today for evaluation and currently complains of occasional increase in frequency of micturition at night  Patient states this problem has been ongoing for about 5 years  Patient states he was previously seen in [facility] for the same complaint and was told that his problem was likely due to psychological issues  Patient states that from time to time on the nights that he is unable to fall asleep or he finds himself going to the bathroom 5-6 times per night, but that during other nights when he is able to go to sleep, he does not wake up to use the bathroom  He states his able to sleep from 11:00 p m  up until 5:00 a m  without having to use the bathroom      Patient denies any urinary incontinence (has never worn a diaper) denies any urgency, hesitancy/straining, poor stream, feeling of incomplete emptying of his bladder, terminal dribble, dysuria or urethral discharge  Patient states he is used to taking multiple cups of caffeine products (Tea) with his last intake of caffeine at 5:00 p m  patient also states that he drinks lot's of fluids  Patient at this time denies any SOB, CP, cough, subjective fever, N/V/D  Review of Systems   Genitourinary: Positive for frequency  Negative for decreased urine volume, difficulty urinating, dysuria, testicular pain and urgency  Objective:      /84 (BP Location: Left arm, Patient Position: Sitting, Cuff Size: Large)   Pulse 69   Temp 98 7 °F (37 1 °C) (Tympanic)   Resp 18   Ht 6' 2" (1 88 m)   Wt 78 9 kg (173 lb 14 4 oz)   SpO2 98%   BMI 22 33 kg/m²          Physical Exam  Vitals signs reviewed  Constitutional:       General: He is not in acute distress  Appearance: Normal appearance  He is normal weight  He is not toxic-appearing  HENT:      Head: Normocephalic and atraumatic  Right Ear: External ear normal       Left Ear: External ear normal       Nose: Nose normal    Neck:      Musculoskeletal: Normal range of motion and neck supple  Cardiovascular:      Rate and Rhythm: Normal rate and regular rhythm  Pulses: Normal pulses  Heart sounds: Normal heart sounds  No murmur  No gallop  Pulmonary:      Effort: Pulmonary effort is normal  No respiratory distress  Breath sounds: Normal breath sounds  No wheezing, rhonchi or rales  Genitourinary:     Prostate: Normal       Comments: BECKI performed:  Visual inspection of the anal region significant for external hemorrhoids  Prostate smooth and uniform, no appreciable nodule  Skin:     General: Skin is warm  Neurological:      Mental Status: He is alert and oriented to person, place, and time

## 2021-03-07 PROBLEM — Z87.898 HISTORY OF NOCTURIA: Status: ACTIVE | Noted: 2021-03-07

## 2021-03-07 NOTE — ASSESSMENT & PLAN NOTE
59-year-old male with past medical history of HTN, CVA (without residual deficit) who is presenting today with complaints of occasional nocturia likely 2/2 sleep disorder versus possible anxiety states versus BPH (however unlikely given no history of prostatism and normal prostate findings on BECKI  given age)  Patient at this time is clinically stable and in no acute distress    · Patient counseled on the need to decrease caffeine intake (skipping evening caffeine/Tea)  · Patient advised on using melatonin 5 mg 30-60 minutes before hour Sleep to help regularize sleep pattern (patient endorses no nocturia when he sleeps through the night)  · Patient also advised to decrease fluid intake close to hour of Sleep  · Will follow-up patient in 3 weeks to review progress

## 2021-03-08 ENCOUNTER — PREP FOR PROCEDURE (OUTPATIENT)
Dept: GASTROENTEROLOGY | Facility: CLINIC | Age: 71
End: 2021-03-08

## 2021-03-08 ENCOUNTER — TELEPHONE (OUTPATIENT)
Dept: GASTROENTEROLOGY | Facility: CLINIC | Age: 71
End: 2021-03-08

## 2021-03-08 DIAGNOSIS — R19.8 CHANGE IN BOWEL MOVEMENT: ICD-10-CM

## 2021-03-08 DIAGNOSIS — D50.8 OTHER IRON DEFICIENCY ANEMIA: ICD-10-CM

## 2021-03-08 DIAGNOSIS — R10.13 EPIGASTRIC PAIN: ICD-10-CM

## 2021-03-08 DIAGNOSIS — R93.5 ABNORMAL CT OF THE ABDOMEN: Primary | ICD-10-CM

## 2021-03-08 DIAGNOSIS — Z20.822 ENCOUNTER FOR LABORATORY TESTING FOR COVID-19 VIRUS: ICD-10-CM

## 2021-03-08 NOTE — TELEPHONE ENCOUNTER
Called pt's daughter, scheduled colonoscopy/EGD on 3/25 at [facility]  Aware to get covid testing done on 3/19  Pt has prep and instructions at home  Pastor Solitario took down info for [employer], number and location

## 2021-03-11 ENCOUNTER — OFFICE VISIT (OUTPATIENT)
Dept: OBGYN CLINIC | Facility: CLINIC | Age: 71
End: 2021-03-11
Payer: COMMERCIAL

## 2021-03-11 VITALS
SYSTOLIC BLOOD PRESSURE: 126 MMHG | DIASTOLIC BLOOD PRESSURE: 76 MMHG | WEIGHT: 174.8 LBS | HEIGHT: 74 IN | TEMPERATURE: 98 F | HEART RATE: 70 BPM | BODY MASS INDEX: 22.43 KG/M2

## 2021-03-11 DIAGNOSIS — M17.11 PRIMARY OSTEOARTHRITIS OF RIGHT KNEE: Primary | ICD-10-CM

## 2021-03-11 DIAGNOSIS — M25.561 BILATERAL CHRONIC KNEE PAIN: ICD-10-CM

## 2021-03-11 DIAGNOSIS — M17.0 BILATERAL PRIMARY OSTEOARTHRITIS OF KNEE: ICD-10-CM

## 2021-03-11 DIAGNOSIS — M25.562 BILATERAL CHRONIC KNEE PAIN: ICD-10-CM

## 2021-03-11 DIAGNOSIS — Z01.818 PRE-OP EXAM: ICD-10-CM

## 2021-03-11 DIAGNOSIS — Z11.59 SCREENING FOR VIRAL DISEASE: ICD-10-CM

## 2021-03-11 DIAGNOSIS — G89.29 BILATERAL CHRONIC KNEE PAIN: ICD-10-CM

## 2021-03-11 PROCEDURE — 99215 OFFICE O/P EST HI 40 MIN: CPT | Performed by: ORTHOPAEDIC SURGERY

## 2021-03-11 RX ORDER — CHLORHEXIDINE GLUCONATE 0.12 MG/ML
15 RINSE ORAL ONCE
Status: CANCELLED | OUTPATIENT
Start: 2021-04-05 | End: 2021-03-11

## 2021-03-11 RX ORDER — FOLIC ACID 1 MG/1
1 TABLET ORAL DAILY
Qty: 30 TABLET | Refills: 0 | Status: SHIPPED | OUTPATIENT
Start: 2021-03-11 | End: 2021-04-06 | Stop reason: HOSPADM

## 2021-03-11 RX ORDER — MELATONIN
1000 DAILY
Qty: 30 TABLET | Refills: 0 | Status: SHIPPED | OUTPATIENT
Start: 2021-03-11 | End: 2021-03-23 | Stop reason: SDUPTHER

## 2021-03-11 RX ORDER — ZINC SULFATE 50(220)MG
220 CAPSULE ORAL DAILY
Qty: 30 CAPSULE | Refills: 0 | Status: SHIPPED | OUTPATIENT
Start: 2021-03-11 | End: 2021-03-23 | Stop reason: SDUPTHER

## 2021-03-11 RX ORDER — FERROUS SULFATE TAB EC 324 MG (65 MG FE EQUIVALENT) 324 (65 FE) MG
324 TABLET DELAYED RESPONSE ORAL
Qty: 30 TABLET | Refills: 0 | Status: SHIPPED | OUTPATIENT
Start: 2021-03-11 | End: 2021-03-23 | Stop reason: SDUPTHER

## 2021-03-11 NOTE — PROGRESS NOTES
Assessment/Plan:  1  Primary osteoarthritis of right knee  cholecalciferol (VITAMIN D3) 1,000 units tablet    ascorbic Acid (VITAMIN C) 500 MG CPCR    zinc sulfate (ZINCATE) 220 mg capsule    folic acid (FOLVITE) 1 mg tablet    ferrous sulfate 324 (65 Fe) mg    Comprehensive metabolic panel    Hemoglobin A1C W/EAG Estimation    CBC and differential    PAT Covid Screening    Protime-INR    APTT    MRSA culture    Ambulatory referral to Cardiology    Ambulatory referral to [facility]    Ambulatory referral to Physical Therapy    EKG 12 lead    XR chest pa & lateral    Comprehensive metabolic panel    CBC and differential    Protime-INR    APTT    MRSA culture   2  Bilateral primary osteoarthritis of knee  cholecalciferol (VITAMIN D3) 1,000 units tablet    ascorbic Acid (VITAMIN C) 500 MG CPCR    zinc sulfate (ZINCATE) 220 mg capsule    folic acid (FOLVITE) 1 mg tablet    ferrous sulfate 324 (65 Fe) mg    Case request operating room: ARTHROPLASTY KNEE TOTAL    Case request operating room: ARTHROPLASTY KNEE TOTAL   3  Bilateral chronic knee pain  Case request operating room: ARTHROPLASTY KNEE TOTAL    Comprehensive metabolic panel    Hemoglobin A1C W/EAG Estimation    CBC and differential    PAT Covid Screening    Protime-INR    APTT    MRSA culture    Ambulatory referral to Cardiology    Ambulatory referral to [facility]    Ambulatory referral to Physical Therapy    EKG 12 lead    XR chest pa & lateral    Comprehensive metabolic panel    CBC and differential    Protime-INR    APTT    MRSA culture    Case request operating room: ARTHROPLASTY KNEE TOTAL   4   Pre-op exam  Comprehensive metabolic panel    Hemoglobin A1C W/EAG Estimation    CBC and differential    PAT Covid Screening    Protime-INR    APTT    MRSA culture    Ambulatory referral to Cardiology    Ambulatory referral to [facility]    Ambulatory referral to Physical Therapy    EKG 12 lead    XR chest pa & lateral    Comprehensive metabolic panel CBC and differential    Protime-INR    APTT    MRSA culture   5  Screening for viral disease  PAT Covid Screening     Scribe Attestation    I,:  Eligio Argueta am acting as a scribe while in the presence of the attending physician :       I,:  Charmayne Must, DO personally performed the services described in this documentation    as scribed in my presence :         Danish Santo is a pleasant 75-year-old male who presents to the office today for follow-up evaluation of his bilateral knee pain due to his underlying osteoarthritis  His daughter is here translating for him today  He would like to schedule surgery for his right knee at this time  Unfortunately, he has failed non operative intervention in the form of maintaining an appropriate weight, activity modifications, analgesics and anti-inflammatories  Due to the severity his end-stage osteoarthritis and associated deformity, I do not believe formal physical therapy and/or injection therapy would provide him with significant relief  His painful symptoms has been affecting his activities of daily living  I do believe he is a candidate for a right total knee arthroplasty  The pre margie and postoperative expectations were discussed here in the office today  The risks and benefits of undergoing a right total knee arthroplasty were discussed at length and consents were signed and placed in the chart  Please see risk discussion below  His daughter is interpreting for us today in the office  He does deny any history of DVT/ PE, malignancy, MRSA, GI procedure/bleeding or peptic ulcer, or diabetes, or turmeric use  He currently takes aspirin daily  He is a candidate for outpatient physical therapy  He will require PCP and cardiology clearances  He will follow up 2 weeks status post procedure  All of his questions were answered today in the office  He will meet with 1 of our surgery schedulers to be placed on the schedule        Subjective: follow up evaluation of bilateral knee pain  Patient ID: Benjamín Valdez is a 65 y o  male  Liana Carver is a pleasant 58-year-old male who presents to the office today for follow-up evaluation of his bilateral knee pain due to his underlying osteoarthritis  His daughter is translating for him today  He states his symptoms are unchanged from his previous visit and would like to schedule surgery for his right knee, at this time  He localizes the majority of his pain along the lateral aspect of his knee  He describes his pain as constant, achy, sore and moderate in intensity  He denies any history of surgery to either knee or interventions in the form of formal physical therapy or steroid injections  He denies any swelling about his knee  He denies any numbness and tingling  He denies any radicular symptoms  Review of Systems   Constitutional: Positive for activity change  Negative for chills, fever and unexpected weight change  HENT: Negative  Negative for hearing loss, nosebleeds and sore throat  Eyes: Negative  Negative for pain, redness and visual disturbance  Respiratory: Negative  Negative for cough, shortness of breath and wheezing  Cardiovascular: Negative  Negative for chest pain, palpitations and leg swelling  Gastrointestinal: Negative  Negative for abdominal pain, nausea and vomiting  Endocrine: Negative  Negative for polyphagia and polyuria  Genitourinary: Negative for dysuria and hematuria  Musculoskeletal: Positive for arthralgias  See HPI   Skin: Negative  Negative for rash and wound  Neurological: Negative  Negative for dizziness, numbness and headaches  Psychiatric/Behavioral: Negative  Negative for decreased concentration and suicidal ideas  The patient is not nervous/anxious            Past Medical History:   Diagnosis Date    Anxiety     Arthritis     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 ([facility] [address]     Hypertension        Past Surgical History:   Procedure Laterality Date    HERNIA REPAIR         Family History   Problem Relation Age of Onset    Stroke Mother     Stroke Father        Social History     Occupational History    Not on file   Tobacco Use    Smoking status: Never Smoker    Smokeless tobacco: Never Used   Substance and Sexual Activity    Alcohol use: Never     Frequency: Never    Drug use: Never    Sexual activity: Not Currently         Current Outpatient Medications:     amLODIPine (NORVASC) 5 mg tablet, Take 1 tablet (5 mg total) by mouth 2 (two) times a day, Disp: 60 tablet, Rfl: 0    ascorbic Acid (VITAMIN C) 500 MG CPCR, Take 1 capsule (500 mg total) by mouth 2 (two) times a day, Disp: 60 capsule, Rfl: 0    aspirin (ECOTRIN LOW STRENGTH) 81 mg EC tablet, Take 1 tablet (81 mg total) by mouth daily, Disp: 30 tablet, Rfl: 0    atorvastatin (LIPITOR) 10 mg tablet, Take 1 tablet (10 mg total) by mouth daily with dinner, Disp: 30 tablet, Rfl: 0    cholecalciferol (VITAMIN D3) 1,000 units tablet, Take 1 tablet (1,000 Units total) by mouth daily, Disp: 30 tablet, Rfl: 0    docusate sodium (COLACE) 100 mg capsule, Take 1 capsule (100 mg total) by mouth 2 (two) times a day, Disp: 10 capsule, Rfl: 0    escitalopram (LEXAPRO) 5 mg tablet, Take 1 tablet (5 mg total) by mouth daily, Disp: 30 tablet, Rfl: 0    ferrous sulfate 324 (65 Fe) mg, Take 1 tablet (324 mg total) by mouth daily before breakfast, Disp: 30 tablet, Rfl: 0    folic acid (FOLVITE) 1 mg tablet, Take 1 tablet (1 mg total) by mouth daily, Disp: 30 tablet, Rfl: 0    GaviLAX 17 GM/SCOOP powder, , Disp: , Rfl:     labetalol (NORMODYNE) 200 mg tablet, Take 1 tablet (200 mg total) by mouth every 12 (twelve) hours, Disp: 60 tablet, Rfl: 0    losartan (COZAAR) 50 mg tablet, Take 1 tablet (50 mg total) by mouth 2 (two) times a day, Disp: 60 tablet, Rfl: 0    Melatonin 5 MG CAPS, Take 1 capsule (5 mg total) by mouth daily at bedtime, Disp: 30 capsule, Rfl: 0    Na Sulfate-K Sulfate-Mg Sulf (Suprep Bowel Prep Kit) 17 5-3 13-1 6 GM/177ML SOLN, Take 1 Bottle by mouth once for 1 dose, Disp: 1 Bottle, Rfl: 0    polyethylene glycol (MIRALAX) 17 g packet, Take 17 g by mouth daily as needed (constipation) for up to 14 days, Disp: 14 each, Rfl: 0    zinc sulfate (ZINCATE) 220 mg capsule, Take 1 capsule (220 mg total) by mouth daily, Disp: 30 capsule, Rfl: 0    No Known Allergies    Objective:  Vitals:    03/11/21 1509   BP: 126/76   Pulse: 70   Temp: 98 °F (36 7 °C)       Body mass index is 22 44 kg/m²  Right Knee Exam     Tenderness   The patient is experiencing tenderness in the medial joint line and lateral joint line  Range of Motion   Extension: 0   Flexion: 130     Tests   Varus: negative Valgus: negative  Lachman:  Anterior - negative    Posterior - negative  Drawer:  Anterior - negative    Posterior - negative  Patellar apprehension: negative    Other   Erythema: absent  Scars: absent  Sensation: normal  Pulse: present  Effusion: effusion (Popliteal) present    Comments:    10 degree varus deformity, passively correctable to neutral   Crepitus on range of motion  Neurovascularly intact  Left Knee Exam     Tenderness   The patient is experiencing tenderness in the medial joint line and patella  Range of Motion   Extension: 0   Flexion: 130     Tests   Varus: negative Valgus: negative  Lachman:  Anterior - negative    Posterior - negative  Drawer:  Anterior - negative     Posterior - negative  Patellar apprehension: negative    Other   Erythema: absent  Scars: absent  Sensation: normal  Pulse: present  Effusion: effusion (Popliteal) present    Comments:    10 degree varus deformity, passively correctable to neutral   Crepitus on range of motion  Neurovascularly intact  Observations   Left Knee   Positive for effusion (Popliteal)  Right Knee   Positive for effusion (Popliteal)  Physical Exam  Vitals signs and nursing note reviewed     Constitutional:       Appearance: Normal appearance  He is well-developed  HENT:      Head: Normocephalic and atraumatic  Eyes:      General: No scleral icterus  Extraocular Movements: Extraocular movements intact  Conjunctiva/sclera: Conjunctivae normal    Neck:      Musculoskeletal: Normal range of motion and neck supple  Cardiovascular:      Rate and Rhythm: Normal rate  Pulmonary:      Effort: Pulmonary effort is normal  No respiratory distress  Abdominal:      Palpations: Abdomen is soft  Musculoskeletal:      Right knee: He exhibits effusion (Popliteal)  Left knee: He exhibits effusion (Popliteal)  Comments: As noted in HPI   Skin:     General: Skin is warm and dry  Neurological:      General: No focal deficit present  Mental Status: He is alert and oriented to person, place, and time  Psychiatric:         Mood and Affect: Mood normal          Behavior: Behavior normal          I have personally reviewed pertinent films in PACS  X-rays of bilateral knees obtained on 03/04/2021 demonstrate end stage osteoarthritis evident by severe joint line narrowing, sclerosis, osteophyte formation and valgus deformity  With the severity of his osteoarthritis it appears that he is eroding his proximal lateral tibia  No lytic or blastic lesion  No fracture appreciated  The patient was counseled in detail regarding the diagnosis, the treatment options available, the prognosis of each treatment option, the potential risks and complications  These are, but are not limited to; deep vein thrombosis, pulmonary embolism, neurologic and vascular injury, infection, instability, leg length discrepancy, dislocation, hematoma, reflex sympathetic dystrophy, loss of range of motion, ankylosis of the knee, fracture, screw or prosthetic perforation, chronic pain, acute pain, chronic leg pain and edema, loosening, death, heart attack, and stroke  The patient's questions were answered in detail    The patient demonstrates understanding of these risks and wishes to proceed with surgery

## 2021-03-11 NOTE — H&P (VIEW-ONLY)
Assessment/Plan:  1  Primary osteoarthritis of right knee  cholecalciferol (VITAMIN D3) 1,000 units tablet    ascorbic Acid (VITAMIN C) 500 MG CPCR    zinc sulfate (ZINCATE) 220 mg capsule    folic acid (FOLVITE) 1 mg tablet    ferrous sulfate 324 (65 Fe) mg    Comprehensive metabolic panel    Hemoglobin A1C W/EAG Estimation    CBC and differential    PAT Covid Screening    Protime-INR    APTT    MRSA culture    Ambulatory referral to Cardiology    Ambulatory referral to [facility]    Ambulatory referral to Physical Therapy    EKG 12 lead    XR chest pa & lateral    Comprehensive metabolic panel    CBC and differential    Protime-INR    APTT    MRSA culture   2  Bilateral primary osteoarthritis of knee  cholecalciferol (VITAMIN D3) 1,000 units tablet    ascorbic Acid (VITAMIN C) 500 MG CPCR    zinc sulfate (ZINCATE) 220 mg capsule    folic acid (FOLVITE) 1 mg tablet    ferrous sulfate 324 (65 Fe) mg    Case request operating room: ARTHROPLASTY KNEE TOTAL    Case request operating room: ARTHROPLASTY KNEE TOTAL   3  Bilateral chronic knee pain  Case request operating room: ARTHROPLASTY KNEE TOTAL    Comprehensive metabolic panel    Hemoglobin A1C W/EAG Estimation    CBC and differential    PAT Covid Screening    Protime-INR    APTT    MRSA culture    Ambulatory referral to Cardiology    Ambulatory referral to [facility]    Ambulatory referral to Physical Therapy    EKG 12 lead    XR chest pa & lateral    Comprehensive metabolic panel    CBC and differential    Protime-INR    APTT    MRSA culture    Case request operating room: ARTHROPLASTY KNEE TOTAL   4   Pre-op exam  Comprehensive metabolic panel    Hemoglobin A1C W/EAG Estimation    CBC and differential    PAT Covid Screening    Protime-INR    APTT    MRSA culture    Ambulatory referral to Cardiology    Ambulatory referral to [facility]    Ambulatory referral to Physical Therapy    EKG 12 lead    XR chest pa & lateral    Comprehensive metabolic panel CBC and differential    Protime-INR    APTT    MRSA culture   5  Screening for viral disease  PAT Covid Screening     Scribe Attestation    I,:  Lino Ferrari am acting as a scribe while in the presence of the attending physician :       I,:  Victor Manuel Hammer, DO personally performed the services described in this documentation    as scribed in my presence :         Clemente Black is a pleasant 68-year-old male who presents to the office today for follow-up evaluation of his bilateral knee pain due to his underlying osteoarthritis  His daughter is here translating for him today  He would like to schedule surgery for his right knee at this time  Unfortunately, he has failed non operative intervention in the form of maintaining an appropriate weight, activity modifications, analgesics and anti-inflammatories  Due to the severity his end-stage osteoarthritis and associated deformity, I do not believe formal physical therapy and/or injection therapy would provide him with significant relief  His painful symptoms has been affecting his activities of daily living  I do believe he is a candidate for a right total knee arthroplasty  The pre margie and postoperative expectations were discussed here in the office today  The risks and benefits of undergoing a right total knee arthroplasty were discussed at length and consents were signed and placed in the chart  Please see risk discussion below  His daughter is interpreting for us today in the office  He does deny any history of DVT/ PE, malignancy, MRSA, GI procedure/bleeding or peptic ulcer, or diabetes, or turmeric use  He currently takes aspirin daily  He is a candidate for outpatient physical therapy  He will require PCP and cardiology clearances  He will follow up 2 weeks status post procedure  All of his questions were answered today in the office  He will meet with 1 of our surgery schedulers to be placed on the schedule        Subjective: follow up evaluation of bilateral knee pain  Patient ID: Alfonso Peterson is a 65 y o  male  Florinda Peng is a pleasant 64-year-old male who presents to the office today for follow-up evaluation of his bilateral knee pain due to his underlying osteoarthritis  His daughter is translating for him today  He states his symptoms are unchanged from his previous visit and would like to schedule surgery for his right knee, at this time  He localizes the majority of his pain along the lateral aspect of his knee  He describes his pain as constant, achy, sore and moderate in intensity  He denies any history of surgery to either knee or interventions in the form of formal physical therapy or steroid injections  He denies any swelling about his knee  He denies any numbness and tingling  He denies any radicular symptoms  Review of Systems   Constitutional: Positive for activity change  Negative for chills, fever and unexpected weight change  HENT: Negative  Negative for hearing loss, nosebleeds and sore throat  Eyes: Negative  Negative for pain, redness and visual disturbance  Respiratory: Negative  Negative for cough, shortness of breath and wheezing  Cardiovascular: Negative  Negative for chest pain, palpitations and leg swelling  Gastrointestinal: Negative  Negative for abdominal pain, nausea and vomiting  Endocrine: Negative  Negative for polyphagia and polyuria  Genitourinary: Negative for dysuria and hematuria  Musculoskeletal: Positive for arthralgias  See HPI   Skin: Negative  Negative for rash and wound  Neurological: Negative  Negative for dizziness, numbness and headaches  Psychiatric/Behavioral: Negative  Negative for decreased concentration and suicidal ideas  The patient is not nervous/anxious            Past Medical History:   Diagnosis Date    Anxiety     Arthritis     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 ([facility] [address] [facility]     Hypertension        Past Surgical History:   Procedure Laterality Date    HERNIA REPAIR         Family History   Problem Relation Age of Onset    Stroke Mother     Stroke Father        Social History     Occupational History    Not on file   Tobacco Use    Smoking status: Never Smoker    Smokeless tobacco: Never Used   Substance and Sexual Activity    Alcohol use: Never     Frequency: Never    Drug use: Never    Sexual activity: Not Currently         Current Outpatient Medications:     amLODIPine (NORVASC) 5 mg tablet, Take 1 tablet (5 mg total) by mouth 2 (two) times a day, Disp: 60 tablet, Rfl: 0    ascorbic Acid (VITAMIN C) 500 MG CPCR, Take 1 capsule (500 mg total) by mouth 2 (two) times a day, Disp: 60 capsule, Rfl: 0    aspirin (ECOTRIN LOW STRENGTH) 81 mg EC tablet, Take 1 tablet (81 mg total) by mouth daily, Disp: 30 tablet, Rfl: 0    atorvastatin (LIPITOR) 10 mg tablet, Take 1 tablet (10 mg total) by mouth daily with dinner, Disp: 30 tablet, Rfl: 0    cholecalciferol (VITAMIN D3) 1,000 units tablet, Take 1 tablet (1,000 Units total) by mouth daily, Disp: 30 tablet, Rfl: 0    docusate sodium (COLACE) 100 mg capsule, Take 1 capsule (100 mg total) by mouth 2 (two) times a day, Disp: 10 capsule, Rfl: 0    escitalopram (LEXAPRO) 5 mg tablet, Take 1 tablet (5 mg total) by mouth daily, Disp: 30 tablet, Rfl: 0    ferrous sulfate 324 (65 Fe) mg, Take 1 tablet (324 mg total) by mouth daily before breakfast, Disp: 30 tablet, Rfl: 0    folic acid (FOLVITE) 1 mg tablet, Take 1 tablet (1 mg total) by mouth daily, Disp: 30 tablet, Rfl: 0    GaviLAX 17 GM/SCOOP powder, , Disp: , Rfl:     labetalol (NORMODYNE) 200 mg tablet, Take 1 tablet (200 mg total) by mouth every 12 (twelve) hours, Disp: 60 tablet, Rfl: 0    losartan (COZAAR) 50 mg tablet, Take 1 tablet (50 mg total) by mouth 2 (two) times a day, Disp: 60 tablet, Rfl: 0    Melatonin 5 MG CAPS, Take 1 capsule (5 mg total) by mouth daily at bedtime, Disp: 30 capsule, Rfl: 0    Na Sulfate-K Sulfate-Mg Sulf (Suprep Bowel Prep Kit) 17 5-3 13-1 6 GM/177ML SOLN, Take 1 Bottle by mouth once for 1 dose, Disp: 1 Bottle, Rfl: 0    polyethylene glycol (MIRALAX) 17 g packet, Take 17 g by mouth daily as needed (constipation) for up to 14 days, Disp: 14 each, Rfl: 0    zinc sulfate (ZINCATE) 220 mg capsule, Take 1 capsule (220 mg total) by mouth daily, Disp: 30 capsule, Rfl: 0    No Known Allergies    Objective:  Vitals:    03/11/21 1509   BP: 126/76   Pulse: 70   Temp: 98 °F (36 7 °C)       Body mass index is 22 44 kg/m²  Right Knee Exam     Tenderness   The patient is experiencing tenderness in the medial joint line and lateral joint line  Range of Motion   Extension: 0   Flexion: 130     Tests   Varus: negative Valgus: negative  Lachman:  Anterior - negative    Posterior - negative  Drawer:  Anterior - negative    Posterior - negative  Patellar apprehension: negative    Other   Erythema: absent  Scars: absent  Sensation: normal  Pulse: present  Effusion: effusion (Popliteal) present    Comments:    10 degree varus deformity, passively correctable to neutral   Crepitus on range of motion  Neurovascularly intact  Left Knee Exam     Tenderness   The patient is experiencing tenderness in the medial joint line and patella  Range of Motion   Extension: 0   Flexion: 130     Tests   Varus: negative Valgus: negative  Lachman:  Anterior - negative    Posterior - negative  Drawer:  Anterior - negative     Posterior - negative  Patellar apprehension: negative    Other   Erythema: absent  Scars: absent  Sensation: normal  Pulse: present  Effusion: effusion (Popliteal) present    Comments:    10 degree varus deformity, passively correctable to neutral   Crepitus on range of motion  Neurovascularly intact  Observations   Left Knee   Positive for effusion (Popliteal)  Right Knee   Positive for effusion (Popliteal)  Physical Exam  Vitals signs and nursing note reviewed     Constitutional:       Appearance: Normal appearance  He is well-developed  HENT:      Head: Normocephalic and atraumatic  Eyes:      General: No scleral icterus  Extraocular Movements: Extraocular movements intact  Conjunctiva/sclera: Conjunctivae normal    Neck:      Musculoskeletal: Normal range of motion and neck supple  Cardiovascular:      Rate and Rhythm: Normal rate  Pulmonary:      Effort: Pulmonary effort is normal  No respiratory distress  Abdominal:      Palpations: Abdomen is soft  Musculoskeletal:      Right knee: He exhibits effusion (Popliteal)  Left knee: He exhibits effusion (Popliteal)  Comments: As noted in HPI   Skin:     General: Skin is warm and dry  Neurological:      General: No focal deficit present  Mental Status: He is alert and oriented to person, place, and time  Psychiatric:         Mood and Affect: Mood normal          Behavior: Behavior normal          I have personally reviewed pertinent films in PACS  X-rays of bilateral knees obtained on 03/04/2021 demonstrate end stage osteoarthritis evident by severe joint line narrowing, sclerosis, osteophyte formation and valgus deformity  With the severity of his osteoarthritis it appears that he is eroding his proximal lateral tibia  No lytic or blastic lesion  No fracture appreciated  The patient was counseled in detail regarding the diagnosis, the treatment options available, the prognosis of each treatment option, the potential risks and complications  These are, but are not limited to; deep vein thrombosis, pulmonary embolism, neurologic and vascular injury, infection, instability, leg length discrepancy, dislocation, hematoma, reflex sympathetic dystrophy, loss of range of motion, ankylosis of the knee, fracture, screw or prosthetic perforation, chronic pain, acute pain, chronic leg pain and edema, loosening, death, heart attack, and stroke  The patient's questions were answered in detail    The patient demonstrates understanding of these risks and wishes to proceed with surgery

## 2021-03-15 ENCOUNTER — TRANSCRIBE ORDERS (OUTPATIENT)
Dept: ADMINISTRATIVE | Facility: HOSPITAL | Age: 71
End: 2021-03-15

## 2021-03-15 ENCOUNTER — TELEPHONE (OUTPATIENT)
Dept: GASTROENTEROLOGY | Facility: CLINIC | Age: 71
End: 2021-03-15

## 2021-03-23 ENCOUNTER — HOSPITAL ENCOUNTER (OUTPATIENT)
Dept: RADIOLOGY | Facility: HOSPITAL | Age: 71
Discharge: HOME | End: 2021-03-23
Attending: ORTHOPAEDIC SURGERY
Payer: COMMERCIAL

## 2021-03-23 ENCOUNTER — TRANSCRIBE ORDERS (OUTPATIENT)
Dept: ADMINISTRATIVE | Facility: HOSPITAL | Age: 71
End: 2021-03-23
Payer: COMMERCIAL

## 2021-03-23 ENCOUNTER — OFFICE VISIT (OUTPATIENT)
Dept: FAMILY MEDICINE CLINIC | Facility: CLINIC | Age: 71
End: 2021-03-23
Payer: COMMERCIAL

## 2021-03-23 ENCOUNTER — TELEPHONE (OUTPATIENT)
Dept: OBGYN CLINIC | Facility: HOSPITAL | Age: 71
End: 2021-03-23

## 2021-03-23 ENCOUNTER — OFFICE VISIT (OUTPATIENT)
Dept: LAB | Facility: HOSPITAL | Age: 71
End: 2021-03-23
Attending: ORTHOPAEDIC SURGERY
Payer: COMMERCIAL

## 2021-03-23 VITALS
SYSTOLIC BLOOD PRESSURE: 130 MMHG | DIASTOLIC BLOOD PRESSURE: 80 MMHG | BODY MASS INDEX: 22.2 KG/M2 | OXYGEN SATURATION: 97 % | HEIGHT: 74 IN | WEIGHT: 173 LBS | HEART RATE: 68 BPM | TEMPERATURE: 98.4 F | RESPIRATION RATE: 18 BRPM

## 2021-03-23 DIAGNOSIS — Z86.73 HISTORY OF HEMORRHAGIC CEREBROVASCULAR ACCIDENT (CVA) WITHOUT RESIDUAL DEFICITS: ICD-10-CM

## 2021-03-23 DIAGNOSIS — I10 ESSENTIAL HYPERTENSION: ICD-10-CM

## 2021-03-23 DIAGNOSIS — M17.11 PRIMARY OSTEOARTHRITIS OF RIGHT KNEE: ICD-10-CM

## 2021-03-23 DIAGNOSIS — K59.00 CONSTIPATION, UNSPECIFIED CONSTIPATION TYPE: ICD-10-CM

## 2021-03-23 DIAGNOSIS — G89.29 BILATERAL CHRONIC KNEE PAIN: ICD-10-CM

## 2021-03-23 DIAGNOSIS — Z01.818 PRE-OP EXAM: ICD-10-CM

## 2021-03-23 DIAGNOSIS — Z01.818 PREOPERATIVE CLEARANCE: Primary | ICD-10-CM

## 2021-03-23 DIAGNOSIS — F32.A DEPRESSION: ICD-10-CM

## 2021-03-23 DIAGNOSIS — Z01.818 PRE-OP TESTING: Primary | ICD-10-CM

## 2021-03-23 DIAGNOSIS — M17.0 BILATERAL PRIMARY OSTEOARTHRITIS OF KNEE: ICD-10-CM

## 2021-03-23 DIAGNOSIS — G47.00 INSOMNIA, UNSPECIFIED TYPE: ICD-10-CM

## 2021-03-23 DIAGNOSIS — M25.561 BILATERAL CHRONIC KNEE PAIN: ICD-10-CM

## 2021-03-23 DIAGNOSIS — M25.562 BILATERAL CHRONIC KNEE PAIN: ICD-10-CM

## 2021-03-23 LAB
ALBUMIN SERPL BCP-MCNC: 3.4 G/DL (ref 3.5–5)
ALP SERPL-CCNC: 78 U/L (ref 46–116)
ALT SERPL W P-5'-P-CCNC: 19 U/L (ref 12–78)
ANION GAP SERPL CALCULATED.3IONS-SCNC: 6 MMOL/L (ref 4–13)
APTT PPP: 28 SECONDS (ref 23–37)
AST SERPL W P-5'-P-CCNC: 14 U/L (ref 5–45)
BASOPHILS # BLD AUTO: 0.07 THOUSANDS/ΜL (ref 0–0.1)
BASOPHILS NFR BLD AUTO: 1 % (ref 0–1)
BILIRUB SERPL-MCNC: 0.4 MG/DL (ref 0.2–1)
BUN SERPL-MCNC: 20 MG/DL (ref 5–25)
CALCIUM ALBUM COR SERPL-MCNC: 9 MG/DL (ref 8.3–10.1)
CALCIUM SERPL-MCNC: 8.5 MG/DL (ref 8.3–10.1)
CHLORIDE SERPL-SCNC: 100 MMOL/L (ref 100–108)
CO2 SERPL-SCNC: 27 MMOL/L (ref 21–32)
CREAT SERPL-MCNC: 1.26 MG/DL (ref 0.6–1.3)
EOSINOPHIL # BLD AUTO: 0.11 THOUSAND/ΜL (ref 0–0.61)
EOSINOPHIL NFR BLD AUTO: 2 % (ref 0–6)
ERYTHROCYTE [DISTWIDTH] IN BLOOD BY AUTOMATED COUNT: 16.2 % (ref 11.6–15.1)
EST. AVERAGE GLUCOSE BLD GHB EST-MCNC: 114 MG/DL
GFR SERPL CREATININE-BSD FRML MDRD: 57 ML/MIN/1.73SQ M
GLUCOSE SERPL-MCNC: 108 MG/DL (ref 65–140)
HBA1C MFR BLD: 5.6 %
HCT VFR BLD AUTO: 35.4 % (ref 36.5–49.3)
HGB BLD-MCNC: 11 G/DL (ref 12–17)
IMM GRANULOCYTES # BLD AUTO: 0.02 THOUSAND/UL (ref 0–0.2)
IMM GRANULOCYTES NFR BLD AUTO: 0 % (ref 0–2)
INR PPP: 1.26 (ref 0.84–1.19)
LYMPHOCYTES # BLD AUTO: 1.19 THOUSANDS/ΜL (ref 0.6–4.47)
LYMPHOCYTES NFR BLD AUTO: 18 % (ref 14–44)
MCH RBC QN AUTO: 27.3 PG (ref 26.8–34.3)
MCHC RBC AUTO-ENTMCNC: 31.1 G/DL (ref 31.4–37.4)
MCV RBC AUTO: 88 FL (ref 82–98)
MONOCYTES # BLD AUTO: 0.83 THOUSAND/ΜL (ref 0.17–1.22)
MONOCYTES NFR BLD AUTO: 12 % (ref 4–12)
NEUTROPHILS # BLD AUTO: 4.54 THOUSANDS/ΜL (ref 1.85–7.62)
NEUTS SEG NFR BLD AUTO: 67 % (ref 43–75)
NRBC BLD AUTO-RTO: 0 /100 WBCS
PLATELET # BLD AUTO: 238 THOUSANDS/UL (ref 149–390)
PMV BLD AUTO: 9.1 FL (ref 8.9–12.7)
POTASSIUM SERPL-SCNC: 3.9 MMOL/L (ref 3.5–5.3)
PROT SERPL-MCNC: 7.2 G/DL (ref 6.4–8.2)
PROTHROMBIN TIME: 15.6 SECONDS (ref 11.6–14.5)
RBC # BLD AUTO: 4.03 MILLION/UL (ref 3.88–5.62)
SODIUM SERPL-SCNC: 133 MMOL/L (ref 136–145)
WBC # BLD AUTO: 6.76 THOUSAND/UL (ref 4.31–10.16)

## 2021-03-23 PROCEDURE — 80053 COMPREHEN METABOLIC PANEL: CPT | Performed by: ORTHOPAEDIC SURGERY

## 2021-03-23 PROCEDURE — 85730 THROMBOPLASTIN TIME PARTIAL: CPT | Performed by: ORTHOPAEDIC SURGERY

## 2021-03-23 PROCEDURE — 85610 PROTHROMBIN TIME: CPT | Performed by: ORTHOPAEDIC SURGERY

## 2021-03-23 PROCEDURE — 71046 X-RAY EXAM CHEST 2 VIEWS: CPT

## 2021-03-23 PROCEDURE — 85025 COMPLETE CBC W/AUTO DIFF WBC: CPT | Performed by: ORTHOPAEDIC SURGERY

## 2021-03-23 PROCEDURE — 83036 HEMOGLOBIN GLYCOSYLATED A1C: CPT | Performed by: ORTHOPAEDIC SURGERY

## 2021-03-23 PROCEDURE — 36415 COLL VENOUS BLD VENIPUNCTURE: CPT | Performed by: ORTHOPAEDIC SURGERY

## 2021-03-23 PROCEDURE — 93005 ELECTROCARDIOGRAM TRACING: CPT

## 2021-03-23 PROCEDURE — 99213 OFFICE O/P EST LOW 20 MIN: CPT | Performed by: FAMILY MEDICINE

## 2021-03-23 PROCEDURE — 87081 CULTURE SCREEN ONLY: CPT | Performed by: ORTHOPAEDIC SURGERY

## 2021-03-23 RX ORDER — MELATONIN
1000 DAILY
Qty: 30 TABLET | Refills: 0 | Status: SHIPPED | OUTPATIENT
Start: 2021-03-23 | End: 2021-04-06 | Stop reason: HOSPADM

## 2021-03-23 RX ORDER — POLYETHYLENE GLYCOL 3350 17 G/17G
17 POWDER, FOR SOLUTION ORAL DAILY PRN
Qty: 14 EACH | Refills: 0 | Status: SHIPPED | OUTPATIENT
Start: 2021-03-23 | End: 2021-06-16 | Stop reason: ALTCHOICE

## 2021-03-23 RX ORDER — LOSARTAN POTASSIUM 50 MG/1
50 TABLET ORAL 2 TIMES DAILY
Qty: 60 TABLET | Refills: 0 | Status: SHIPPED | OUTPATIENT
Start: 2021-03-23 | End: 2021-04-26 | Stop reason: SDUPTHER

## 2021-03-23 RX ORDER — AMLODIPINE BESYLATE 5 MG/1
5 TABLET ORAL 2 TIMES DAILY
Qty: 180 TABLET | Refills: 3 | Status: SHIPPED | OUTPATIENT
Start: 2021-03-23 | End: 2021-04-26

## 2021-03-23 RX ORDER — ESCITALOPRAM OXALATE 5 MG/1
5 TABLET ORAL DAILY
Qty: 30 TABLET | Refills: 0 | Status: SHIPPED | OUTPATIENT
Start: 2021-03-23 | End: 2021-04-30 | Stop reason: SDUPTHER

## 2021-03-23 RX ORDER — LABETALOL 200 MG/1
200 TABLET, FILM COATED ORAL EVERY 12 HOURS SCHEDULED
Qty: 60 TABLET | Refills: 0 | Status: SHIPPED | OUTPATIENT
Start: 2021-03-23 | End: 2021-04-26 | Stop reason: SDUPTHER

## 2021-03-23 RX ORDER — ATORVASTATIN CALCIUM 10 MG/1
10 TABLET, FILM COATED ORAL
Qty: 90 TABLET | Refills: 3 | Status: SHIPPED | OUTPATIENT
Start: 2021-03-23 | End: 2021-07-12 | Stop reason: SDUPTHER

## 2021-03-23 RX ORDER — ZINC SULFATE 50(220)MG
220 CAPSULE ORAL DAILY
Qty: 30 CAPSULE | Refills: 0 | Status: SHIPPED | OUTPATIENT
Start: 2021-03-23 | End: 2021-04-06 | Stop reason: HOSPADM

## 2021-03-23 RX ORDER — FERROUS SULFATE TAB EC 324 MG (65 MG FE EQUIVALENT) 324 (65 FE) MG
324 TABLET DELAYED RESPONSE ORAL
Qty: 30 TABLET | Refills: 0 | Status: SHIPPED | OUTPATIENT
Start: 2021-03-23 | End: 2021-04-06 | Stop reason: HOSPADM

## 2021-03-23 NOTE — TELEPHONE ENCOUNTER
Preoperative Elective Admission Assessment- Assigned to care team  S/W Ada Briones, daughter  Daughter lives with her father and mother in 3 level home  Other extended family also resides in same residence  Also of note, pt's wife having surgery same day as pt with same surgeon  Living Situation:  Pt and pt's wife live in a multiple level home with their family, their bedroom/bathroom is in basement  Steps:  #2 steps to enter, they have to sleep in basement level, #5 steps, landing, #5 more steps down to basement with handrail on 1 side  Pt resides now in basement level  First Floor Setup: No sleeping arrangement, so pt will go down into basement level  DME: They have a shower chair  No RW,  canes or BSC  Patient's Current Level of Function: independent with ambulation and ADLs    Post-op Caregiver:  Ada Chopra    Post-op Transport:   Ada Chopra    Outpatient Physical Therapy Site: [facility]    Medication Management: self using pillbox but daughter oversees management  Preferred Pharmacy for Post-op Medications: [facility]                    Blood Management Vitamin Regimen: They confirm pt is taking  Denies questions or issues  Post-op anticoagulant: TBD by surgeon's team    Discharge Plan: pt plans for DC to home and plans to attend OP PT    Barriers to DC identified preoperatively:   *Many steps living level    BMI: 22 44    Caresense: Declined  Patient Education: educated that our goal, if at all possible, is to appropriately discharge patient based off their post-op function while striving to maintain maximal independence  If possible, the goal is to discharge patient to home and for them to attend outpatient physical therapy  I educated patient on the many benefits of outpt PT(Including maintaining independence, additional resources at outpt site, better outcomes etc  )    Also educated on how home PT vs  outpt PT is determined (while inpt)

## 2021-03-24 ENCOUNTER — CONSULT (OUTPATIENT)
Dept: CARDIOLOGY CLINIC | Facility: CLINIC | Age: 71
End: 2021-03-24
Payer: COMMERCIAL

## 2021-03-24 VITALS
BODY MASS INDEX: 22.72 KG/M2 | OXYGEN SATURATION: 97 % | WEIGHT: 177 LBS | SYSTOLIC BLOOD PRESSURE: 142 MMHG | HEIGHT: 74 IN | DIASTOLIC BLOOD PRESSURE: 70 MMHG | HEART RATE: 62 BPM | RESPIRATION RATE: 18 BRPM | TEMPERATURE: 98.6 F

## 2021-03-24 DIAGNOSIS — Z01.818 PRE-OP EXAM: ICD-10-CM

## 2021-03-24 DIAGNOSIS — G89.29 BILATERAL CHRONIC KNEE PAIN: ICD-10-CM

## 2021-03-24 DIAGNOSIS — M25.561 BILATERAL CHRONIC KNEE PAIN: ICD-10-CM

## 2021-03-24 DIAGNOSIS — M25.562 BILATERAL CHRONIC KNEE PAIN: ICD-10-CM

## 2021-03-24 DIAGNOSIS — M17.11 PRIMARY OSTEOARTHRITIS OF RIGHT KNEE: ICD-10-CM

## 2021-03-24 LAB
ATRIAL RATE: 60 BPM
MRSA NOSE QL CULT: NORMAL
P AXIS: 34 DEGREES
PR INTERVAL: 182 MS
QRS AXIS: 7 DEGREES
QRSD INTERVAL: 98 MS
QT INTERVAL: 410 MS
QTC INTERVAL: 410 MS
T WAVE AXIS: 28 DEGREES
VENTRICULAR RATE: 60 BPM

## 2021-03-24 PROCEDURE — 1160F RVW MEDS BY RX/DR IN RCRD: CPT | Performed by: INTERNAL MEDICINE

## 2021-03-24 PROCEDURE — 1036F TOBACCO NON-USER: CPT | Performed by: INTERNAL MEDICINE

## 2021-03-24 PROCEDURE — 93010 ELECTROCARDIOGRAM REPORT: CPT | Performed by: INTERNAL MEDICINE

## 2021-03-24 PROCEDURE — 99243 OFF/OP CNSLTJ NEW/EST LOW 30: CPT | Performed by: INTERNAL MEDICINE

## 2021-03-24 PROCEDURE — 3008F BODY MASS INDEX DOCD: CPT | Performed by: INTERNAL MEDICINE

## 2021-03-25 ENCOUNTER — IMMUNIZATIONS (OUTPATIENT)
Dept: FAMILY MEDICINE CLINIC | Facility: HOSPITAL | Age: 71
End: 2021-03-25

## 2021-03-25 DIAGNOSIS — Z23 ENCOUNTER FOR IMMUNIZATION: Primary | ICD-10-CM

## 2021-03-25 PROCEDURE — 91301 SARS-COV-2 / COVID-19 MRNA VACCINE (MODERNA) 100 MCG: CPT

## 2021-03-25 PROCEDURE — 0012A SARS-COV-2 / COVID-19 MRNA VACCINE (MODERNA) 100 MCG: CPT

## 2021-03-25 NOTE — PROGRESS NOTES
Subjective:     Brody Talbert is a 65 y o  male  who presents to the office today for a preoperative consultation at the request of surgeon Dr Suzi Todd who plans on performing right total knee arthroplasty on April 5  Planned anesthesia is spinal and IV sedation  The patient has no known anesthesia issues  Most recent surgery was hernia repair in July 2020      he is able to ambulate 4 blocks on level ground or 2 flights of stairs without stopping   (>4 METs)  He denies any chest pain or shortness of breath with exertion  In December, he was admitted to [place] [address] with bowel obstruction  Resolved without surgery  The following portions of the patient's history were reviewed and updated as appropriate: allergies, current medications, past family history, past medical history, past social history, past surgical history and problem list     Review of Systems  Review of Systems   Constitutional: Negative for chills and fever  HENT: Negative for ear pain and sore throat  Eyes: Negative for pain and visual disturbance  Respiratory: Negative for cough and shortness of breath  Cardiovascular: Negative for chest pain and palpitations  Gastrointestinal: Negative for abdominal pain and vomiting  Genitourinary: Negative for dysuria and hematuria  Musculoskeletal: Positive for arthralgias and gait problem  Negative for back pain  Skin: Negative for color change and rash  Neurological: Negative for seizures and syncope  All other systems reviewed and are negative  Objective:      Physical Exam  /70 (BP Location: Right arm, Patient Position: Sitting, Cuff Size: Large)   Pulse 62   Temp 98 6 °F (37 °C) (Temporal)   Resp 18   Ht 6' 2" (1 88 m)   Wt 80 3 kg (177 lb)   SpO2 97%   BMI 22 73 kg/m²    Physical Exam   Constitutional: He appears healthy  No distress  Eyes: Pupils are equal, round, and reactive to light   Conjunctivae are normal    Neck: Normal range of motion  Neck supple  No JVD present  Cardiovascular: Normal rate, regular rhythm and normal heart sounds  Exam reveals no gallop and no friction rub  No murmur heard  Pulmonary/Chest: Effort normal and breath sounds normal  He has no wheezes  He has no rales  Musculoskeletal:         General: No tenderness, deformity or edema  Neurological: He is alert and oriented to person, place, and time  Skin: Skin is warm and dry          Cardiographics  ECG: normal sinus rhythm, no blocks or conduction defects, no ischemic changes    Imaging  Chest x-ray: normal     Lab Review   Office Visit on 03/23/2021   Component Date Value    Ventricular Rate 03/23/2021 60     Atrial Rate 03/23/2021 60     WA Interval 03/23/2021 182     QRSD Interval 03/23/2021 98     QT Interval 03/23/2021 410     QTC Interval 03/23/2021 410     P Axis 03/23/2021 34     QRS Axis 03/23/2021 7     T Wave Axis 03/23/2021 28    Transcribe Orders on 03/23/2021   Component Date Value    Hemoglobin A1C 03/23/2021 5 6     EAG 03/23/2021 114     MRSA Culture Only 03/23/2021 No Methicillin Resistant Staphlyococcus aureus (MRSA) isolated     PTT 03/23/2021 28     Protime 03/23/2021 15 6*    INR 03/23/2021 1 26*    WBC 03/23/2021 6 76     RBC 03/23/2021 4 03     Hemoglobin 03/23/2021 11 0*    Hematocrit 03/23/2021 35 4*    MCV 03/23/2021 88     MCH 03/23/2021 27 3     MCHC 03/23/2021 31 1*    RDW 03/23/2021 16 2*    MPV 03/23/2021 9 1     Platelets 05/18/5776 238     nRBC 03/23/2021 0     Neutrophils Relative 03/23/2021 67     Immat GRANS % 03/23/2021 0     Lymphocytes Relative 03/23/2021 18     Monocytes Relative 03/23/2021 12     Eosinophils Relative 03/23/2021 2     Basophils Relative 03/23/2021 1     Neutrophils Absolute 03/23/2021 4 54     Immature Grans Absolute 03/23/2021 0 02     Lymphocytes Absolute 03/23/2021 1 19     Monocytes Absolute 03/23/2021 0 83     Eosinophils Absolute 03/23/2021 0 11     Basophils Absolute 03/23/2021 0 07     Sodium 03/23/2021 133*    Potassium 03/23/2021 3 9     Chloride 03/23/2021 100     CO2 03/23/2021 27     ANION GAP 03/23/2021 6     BUN 03/23/2021 20     Creatinine 03/23/2021 1 26     Glucose 03/23/2021 108     Calcium 03/23/2021 8 5     Corrected Calcium 03/23/2021 9 0     AST 03/23/2021 14     ALT 03/23/2021 19     Alkaline Phosphatase 03/23/2021 78     Total Protein 03/23/2021 7 2     Albumin 03/23/2021 3 4*    Total Bilirubin 03/23/2021 0 40     eGFR 03/23/2021 57    Office Visit on 03/05/2021   Component Date Value    LEUKOCYTE ESTERASE,UA 03/05/2021 25     NITRITE,UA 03/05/2021 neg     SL AMB POCT UROBILINOGEN 03/05/2021 1     POCT URINE PROTEIN 03/05/2021 trace      PH,UA 03/05/2021 6     BLOOD,UA 03/05/2021 neg     SPECIFIC GRAVITY,UA 03/05/2021 1 010     KETONES,UA 03/05/2021 neg     BILIRUBIN,UA 03/05/2021 1     GLUCOSE, UA 03/05/2021 norm      COLOR,UA 03/05/2021 yellow     CLARITY,UA 03/05/2021 clear           Assessment:     1  Primary osteoarthritis of right knee    2  Bilateral chronic knee pain    3  Pre-op exam           56 y o  male  with planned surgery as above  Known risk factors for perioperative complications: None        Cardiac Risk Estimation: per the Revised Cardiac Risk Index, the patient has a score of 0 placing him at low risk of major cardiac event (3 9%),  and may proceed to OR as planned  No further cardiac workup is indicated at this time  Plan:      1  Preoperative workup as follows none  2  Change in medication regimen before surgery: none, continue medication regimen including morning of surgery, with sip of water  3  Prophylaxis for cardiac events with perioperative beta-blockers: Patient is currently on beta blocker therapy, which he should continue on morning of procedure  4  Invasive hemodynamic monitoring perioperatively: not indicated    5  Deep vein thrombosis prophylaxis postoperatively:regimen to be chosen by surgical team   6  Other measures: Call if any new symptoms prior to surgical date

## 2021-03-25 NOTE — PROGRESS NOTES
Assessment/Plan:    No problem-specific Assessment & Plan notes found for this encounter  Diagnoses and all orders for this visit:    Preoperative clearance    Essential hypertension  -     amLODIPine (NORVASC) 5 mg tablet; Take 1 tablet (5 mg total) by mouth 2 (two) times a day  -     losartan (COZAAR) 50 mg tablet; Take 1 tablet (50 mg total) by mouth 2 (two) times a day  -     labetalol (NORMODYNE) 200 mg tablet; Take 1 tablet (200 mg total) by mouth every 12 (twelve) hours    History of hemorrhagic cerebrovascular accident (CVA) without residual deficits  -     atorvastatin (LIPITOR) 10 mg tablet; Take 1 tablet (10 mg total) by mouth daily with dinner    Primary osteoarthritis of right knee  -     cholecalciferol (VITAMIN D3) 1,000 units tablet; Take 1 tablet (1,000 Units total) by mouth daily  -     ferrous sulfate 324 (65 Fe) mg; Take 1 tablet (324 mg total) by mouth daily before breakfast  -     zinc sulfate (ZINCATE) 220 mg capsule; Take 1 capsule (220 mg total) by mouth daily    Bilateral primary osteoarthritis of knee  -     cholecalciferol (VITAMIN D3) 1,000 units tablet; Take 1 tablet (1,000 Units total) by mouth daily  -     ferrous sulfate 324 (65 Fe) mg; Take 1 tablet (324 mg total) by mouth daily before breakfast  -     zinc sulfate (ZINCATE) 220 mg capsule; Take 1 capsule (220 mg total) by mouth daily    Depression  -     escitalopram (LEXAPRO) 5 mg tablet; Take 1 tablet (5 mg total) by mouth daily    Insomnia, unspecified type  -     Melatonin 5 MG CAPS; Take 1 capsule (5 mg total) by mouth daily at bedtime    Constipation, unspecified constipation type  -     polyethylene glycol (MIRALAX) 17 g packet; Take 17 g by mouth daily as needed (constipation) for up to 14 days          Subjective:      Patient ID: Charisma Yi is a 65 y o  male  Patient presents for preoperative clearance  She is planned for  Left total knee arthroplasty with Dr Sean Galindo      He has a history of hypertension, HLD, Depression in iron deficiency  Chronic health conditions well managed with current medication regimen  Does at history of stroke, on a statin  He has no history of any reactions to anesthesia  No history of any cardiac or pulmonary disease  No  known bleeding disorder no family history of any bleeding disorder  He has never had a blood clot  I did review his blood work which was unremarkable  Chest x-ray was within normal limits  He is to go for cardiac clearance in this week  Also needs med refills  The following portions of the patient's history were reviewed and updated as appropriate:   He  has a past medical history of Anxiety, Arthritis, 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 ([facility] [address] ), and Hypertension  He   Patient Active Problem List    Diagnosis Date Noted    History of nocturia 03/07/2021    Abnormal CT of the abdomen 01/04/2021    Change in bowel movement 01/04/2021    Anemia 01/04/2021    Abdominal pain 12/25/2020    Obstipation 12/25/2020    Hypertensive urgency 12/25/2020    History of hemorrhagic cerebrovascular accident (CVA) without residual deficits 12/25/2020    Status post right inguinal hernia repair 12/25/2020    Systemic inflammatory response syndrome ([facility] [address] ) 12/25/2020     He  has a past surgical history that includes Hernia repair  His family history includes Stroke in his father and mother  He  reports that he has never smoked  He has never used smokeless tobacco  He reports that he does not drink alcohol or use drugs    Current Outpatient Medications   Medication Sig Dispense Refill    amLODIPine (NORVASC) 5 mg tablet Take 1 tablet (5 mg total) by mouth 2 (two) times a day 180 tablet 3    ascorbic Acid (VITAMIN C) 500 MG CPCR Take 1 capsule (500 mg total) by mouth 2 (two) times a day 60 capsule 0    aspirin (ECOTRIN LOW STRENGTH) 81 mg EC tablet Take 1 tablet (81 mg total) by mouth daily 30 tablet 0    atorvastatin (LIPITOR) 10 mg tablet Take 1 tablet (10 mg total) by mouth daily with dinner 90 tablet 3    cholecalciferol (VITAMIN D3) 1,000 units tablet Take 1 tablet (1,000 Units total) by mouth daily 30 tablet 0    docusate sodium (COLACE) 100 mg capsule Take 1 capsule (100 mg total) by mouth 2 (two) times a day 10 capsule 0    escitalopram (LEXAPRO) 5 mg tablet Take 1 tablet (5 mg total) by mouth daily 30 tablet 0    ferrous sulfate 324 (65 Fe) mg Take 1 tablet (324 mg total) by mouth daily before breakfast 30 tablet 0    folic acid (FOLVITE) 1 mg tablet Take 1 tablet (1 mg total) by mouth daily 30 tablet 0    labetalol (NORMODYNE) 200 mg tablet Take 1 tablet (200 mg total) by mouth every 12 (twelve) hours 60 tablet 0    losartan (COZAAR) 50 mg tablet Take 1 tablet (50 mg total) by mouth 2 (two) times a day 60 tablet 0    Melatonin 5 MG CAPS Take 1 capsule (5 mg total) by mouth daily at bedtime 30 capsule 0    zinc sulfate (ZINCATE) 220 mg capsule Take 1 capsule (220 mg total) by mouth daily 30 capsule 0    Na Sulfate-K Sulfate-Mg Sulf (Suprep Bowel Prep Kit) 17 5-3 13-1 6 GM/177ML SOLN Take 1 Bottle by mouth once for 1 dose 1 Bottle 0    polyethylene glycol (MIRALAX) 17 g packet Take 17 g by mouth daily as needed (constipation) for up to 14 days 14 each 0     No current facility-administered medications for this visit  Current Outpatient Medications on File Prior to Visit   Medication Sig    ascorbic Acid (VITAMIN C) 500 MG CPCR Take 1 capsule (500 mg total) by mouth 2 (two) times a day    aspirin (ECOTRIN LOW STRENGTH) 81 mg EC tablet Take 1 tablet (81 mg total) by mouth daily    docusate sodium (COLACE) 100 mg capsule Take 1 capsule (100 mg total) by mouth 2 (two) times a day    folic acid (FOLVITE) 1 mg tablet Take 1 tablet (1 mg total) by mouth daily    Na Sulfate-K Sulfate-Mg Sulf (Suprep Bowel Prep Kit) 17 5-3 13-1 6 GM/177ML SOLN Take 1 Bottle by mouth once for 1 dose     No current facility-administered medications on file prior to visit  He has No Known Allergies       Review of Systems   Constitutional: Negative for chills and fever  HENT: Negative for ear pain and sore throat  Eyes: Negative for pain and visual disturbance  Respiratory: Negative for cough and shortness of breath  Cardiovascular: Negative for chest pain and palpitations  Gastrointestinal: Negative for abdominal pain and vomiting  Genitourinary: Negative for dysuria and hematuria  Musculoskeletal: Positive for arthralgias  Negative for back pain  Skin: Negative for color change and rash  Neurological: Negative for seizures and syncope  All other systems reviewed and are negative  Objective:      /80 (BP Location: Left arm, Patient Position: Sitting, Cuff Size: Standard)   Pulse 68   Temp 98 4 °F (36 9 °C) (Tympanic)   Resp 18   Ht 6' 2" (1 88 m)   Wt 78 5 kg (173 lb)   SpO2 97%   BMI 22 21 kg/m²          Physical Exam  Constitutional:       Appearance: He is well-developed  HENT:      Head: Normocephalic and atraumatic  Right Ear: External ear normal       Left Ear: External ear normal    Eyes:      Extraocular Movements: Extraocular movements intact  Conjunctiva/sclera: Conjunctivae normal    Neck:      Musculoskeletal: Neck supple  Cardiovascular:      Rate and Rhythm: Normal rate and regular rhythm  Heart sounds: No murmur  Pulmonary:      Effort: Pulmonary effort is normal       Breath sounds: Normal breath sounds  No wheezing  Neurological:      General: No focal deficit present  Mental Status: He is alert and oriented to person, place, and time  "This note has been constructed using a voice recognition system  Therefore there may be syntax, spelling, and/or grammatical errors   Please call if you have any questions  "

## 2021-03-30 DIAGNOSIS — Z20.822 ENCOUNTER FOR LABORATORY TESTING FOR COVID-19 VIRUS: ICD-10-CM

## 2021-03-30 PROCEDURE — U0003 INFECTIOUS AGENT DETECTION BY NUCLEIC ACID (DNA OR RNA); SEVERE ACUTE RESPIRATORY SYNDROME CORONAVIRUS 2 (SARS-COV-2) (CORONAVIRUS DISEASE [COVID-19]), AMPLIFIED PROBE TECHNIQUE, MAKING USE OF HIGH THROUGHPUT TECHNOLOGIES AS DESCRIBED BY CMS-2020-01-R: HCPCS | Performed by: INTERNAL MEDICINE

## 2021-03-30 PROCEDURE — U0005 INFEC AGEN DETEC AMPLI PROBE: HCPCS | Performed by: INTERNAL MEDICINE

## 2021-03-30 NOTE — PRE-PROCEDURE INSTRUCTIONS
My Surgical Experience    The following information was developed to assist you to prepare for your operation  What do I need to do before coming to the hospital?   Arrange for a responsible person to drive you to and from the hospital    Arrange care for your children at home  Children are not allowed in the recovery areas of the hospital   Plan to wear clothing that is easy to put on and take off  If you are having shoulder surgery, wear a shirt that buttons or zippers in the front  Bathing  o Shower the evening before and the morning of your surgery with an antibacterial soap  Please refer to the Pre Op Showering Instructions for Surgery Patients Sheet   o Remove nail polish and all body piercing jewelry  o Do not shave any body part for at least 24 hours before surgery-this includes face, arms, legs and upper body  Food  o Nothing to eat or drink after midnight the night before your surgery  This includes candy and chewing gum  o Exception: If your surgery is after 12:00pm (noon), you may have clear liquids such as 7-Up®, ginger ale, apple or cranberry juice, Jell-O®, water, or clear broth until 8:00 am  o Do not drink milk or juice with pulp on the morning before surgery  o Do not drink alcohol 24 hours before surgery  Medicine  o Follow instructions you received from your surgeon about which medicines you may take on the day of surgery  o If instructed to take medicine on the morning of surgery, take pills with just a small sip of water  Call your prescribing doctor for specific infroamtion on what to do if you take insulin    What should I bring to the hospital?    Bring:  Stacey Newman or a walker, if you have them, for foot or knee surgery   A list of the daily medicines, vitamins, minerals, herbals and nutritional supplements you take   Include the dosages of medicines and the time you take them each day   Glasses, dentures or hearing aids   Minimal clothing; you will be wearing hospital sleepwear   Photo ID; required to verify your identity   If you have a Living Will or Power of , bring a copy of the documents   If you have an ostomy, bring an extra pouch and any supplies you use    Do not bring   Medicines or inhalers   Money, valuables or jewelry    What other information should I know about the day of surgery?  Notify your surgeons if you develop a cold, sore throat, cough, fever, rash or any other illness   Report to the Ambulatory Surgical/Same Day Surgery Unit   You will be instructed to stop at Registration only if you have not been pre-registered   Inform your  fi they do not stay that they will be asked by the staff to leave a phone number where they can be reached   Be available to be reached before surgery  In the event the operating room schedule changes, you may be asked to come in earlier or later than expected    *It is important to tell your doctor and others involved in your health care if you are taking or have been taking any non-prescription drugs, vitamins, minerals, herbals or other nutritional supplements  Any of these may interact with some food or medicines and cause a reaction      Pre-Surgery Instructions:   Medication Instructions    amLODIPine (NORVASC) 5 mg tablet Instructed patient per Anesthesia Guidelines   ascorbic Acid (VITAMIN C) 500 MG CPCR Instructed patient per Anesthesia Guidelines   atorvastatin (LIPITOR) 10 mg tablet Instructed patient per Anesthesia Guidelines   cholecalciferol (VITAMIN D3) 1,000 units tablet Instructed patient per Anesthesia Guidelines   escitalopram (LEXAPRO) 5 mg tablet Instructed patient per Anesthesia Guidelines   ferrous sulfate 324 (65 Fe) mg Instructed patient per Anesthesia Guidelines   folic acid (FOLVITE) 1 mg tablet Instructed patient per Anesthesia Guidelines   labetalol (NORMODYNE) 200 mg tablet Instructed patient per Anesthesia Guidelines      losartan (COZAAR) 50 mg tablet Instructed patient per Anesthesia Guidelines   Melatonin 5 MG CAPS Instructed patient per Anesthesia Guidelines   zinc sulfate (ZINCATE) 220 mg capsule Instructed patient per Anesthesia Guidelines  To take amlodipine and labetolol a m  of surgery

## 2021-03-31 ENCOUNTER — EVALUATION (OUTPATIENT)
Dept: PHYSICAL THERAPY | Facility: CLINIC | Age: 71
End: 2021-03-31
Payer: COMMERCIAL

## 2021-03-31 DIAGNOSIS — M17.11 PRIMARY OSTEOARTHRITIS OF RIGHT KNEE: ICD-10-CM

## 2021-03-31 DIAGNOSIS — G89.29 BILATERAL CHRONIC KNEE PAIN: ICD-10-CM

## 2021-03-31 DIAGNOSIS — M25.562 BILATERAL CHRONIC KNEE PAIN: ICD-10-CM

## 2021-03-31 DIAGNOSIS — Z01.818 PRE-OP EXAM: ICD-10-CM

## 2021-03-31 DIAGNOSIS — M25.561 BILATERAL CHRONIC KNEE PAIN: ICD-10-CM

## 2021-03-31 LAB — SARS-COV-2 RNA RESP QL NAA+PROBE: NEGATIVE

## 2021-03-31 PROCEDURE — 97161 PT EVAL LOW COMPLEX 20 MIN: CPT

## 2021-03-31 NOTE — PROGRESS NOTES
PT Evaluation     Today's date: 3/31/2021  Patient name: Sherwin Reeves  : [id number]  MRN: [id number]  Referring provider: Mike Davidson  Dx:   Encounter Diagnosis     ICD-10-CM    1  Primary osteoarthritis of right knee  M17 11 Ambulatory referral to Physical Therapy   2  Bilateral chronic knee pain  M25 561 Ambulatory referral to Physical Therapy    M25 562     G89 29    3  Pre-op exam  Z01 [id number] Ambulatory referral to Physical Therapy                  Assessment  Assessment details: Sherwin Reeves is a 65 y o  male who presents to Physical Therapy at [place] [address] with chronic bilateral knee pain for pre-operative examination of right knee for scheduled R TKA on 21  Patient presents with the following impairments: bilateral knee pain, gait dysfunction, limited right knee strength, and limited activity tolerance  Due to these impairments, patient has difficulty performing the following: ADL's, recreational activities, engaging in social activities, ambulation, stair negotiation, lifting/carrying, transfers, prolonged standing, squatting, kneeling, household chores, yard work, and shopping  Patient and family have been educated in HEP, DVT/infection prophylaxis, expected assistive device progression, car transfers, stair negotiation, and home setup  Patient would benefit from skilled physical therapy services to address the above functional limitations and progress towards prior level of function and independence with home exercise program   Impairments: abnormal gait, abnormal muscle firing, abnormal or restricted ROM, activity intolerance, impaired physical strength, lacks appropriate home exercise program, pain with function, weight-bearing intolerance, poor posture  and poor body mechanics  Understanding of Dx/Px/POC: good   Prognosis: good    Goals  Pre-op: Post-op goals to be updated accordingly during first post-op treatment  Short Term Goals (3 weeks; target date: 5/15/21)  1   Patient will be independent with initial HEP  2  Patient will demonstrate an increase in right knee extension AROM to 3° and knee flexion AROM to 90°  3  Patient will demonstrate an increase in right knee strength of 1/2 grade on MMT  Long Term Goals (8 weeks; target date: 6/30/21)  1  Patient will be independent with comprehensive HEP  2  Patient will demonstrate an increase in right knee AROM to WNL in order to promote self-care activities pain-free  3  Patient will demonstrate an increase in right knee strength to 4/5 on MMT  4  Patient will be able to perform a full, functional squat with proper mechanics  5  Patient will be able to ascend/descend 10 stairs reciprocally with use of handrail  6  Patient will be able to ambulate 300 ft on even and uneven surfaces without AD  Plan  Plan details: Patient has been educated on the facility's COVID precautions and procedures  Patient has also been educated on the facility's cancellation policy and has verbalized agreement with this     Patient would benefit from: skilled physical therapy  Planned modality interventions: cryotherapy, electrical stimulation/Russian stimulation, TENS, ultrasound, high voltage pulsed current: pain management, thermotherapy: hydrocollator packs, unattended electrical stimulation and high voltage pulsed current: spasm management  Planned therapy interventions: flexibility, functional ROM exercises, graded exercise, home exercise program, joint mobilization, manual therapy, neuromuscular re-education, patient education, strengthening, stretching, therapeutic exercise, therapeutic activities, activity modification, postural training, body mechanics training, graded activity, self care, muscle pump exercises, abdominal trunk stabilization, ADL retraining, ADL training, IADL retraining, breathing training, behavior modification, therapeutic training, transfer training, Mohan taping, gait training and graded motor  Frequency: 3x week  Duration in weeks: 8  Plan of Care beginning date: 3/31/2021  Plan of Care expiration date: 2021  Treatment plan discussed with: patient and family        Subjective Evaluation    History of Present Illness  Mechanism of injury: Pt's history obtained from pt and pt's daughter, who is present for the evaluation to interpret  Pt's daughter states pt has had chronic bilateral knee pain of several months and has been limited with walking and stair climbing  Pt's daughter states that pt is a candidate for knee replacement of both sides, but has opted to go with the right knee first  Pt's daughter states that x-rays have showed advanced arthritis in both knees  Pt's daughter states that pt has not received injection treatment in the right knee in the past  Pt is scheduled for total knee arthroplasty for the right knee on 21 and scheduled to return to PT 3 days after     Pain  Current pain ratin  At best pain ratin  At worst pain ratin  Location: Right knee  Quality: dull ache  Relieving factors: rest  Aggravating factors: standing, walking, lifting and stair climbing  Progression: no change    Social Support  Steps to enter house: yes  2  Stairs in house: yes   10  Lives in: multiple-level home  Lives with: adult children, young children and spouse    Employment status: not working  Hand dominance: right  Exercise history: Every day - yoga      Diagnostic Tests  X-ray: abnormal  Patient Goals  Patient goals for therapy: decreased pain  Patient goal: To be able to walk better        Objective     Active Range of Motion     Right Knee   Flexion: 128 degrees   Extension: 3 degrees     Strength/Myotome Testing     Right Knee   Flexion: 3+  Extension: 4  Quadriceps contraction: good    Ambulation     Comments   Lacks right knee flexion, ambulates with flat foot strike             Precautions: Hx of CVA; R total knee 21       EVAL (pre-op) 2 3 4 5   Manuals 3/31/21       STM/MFR        Manual flexibility HS, gastrocs, quads        Joint mobs knee        Neuro Re-Ed        [employer] sets Instructed       SLR        Clamshells        Bridging        SLS                Ther Ex        Nustep        Ankle pumps Instructed       Heel slides Instructed       Gastroc stretch        HS stretch                                                Ther Activity        Pt education POC, HEP       Stairs        Squats        Gait                                Modalities

## 2021-04-04 ENCOUNTER — ANESTHESIA EVENT (OUTPATIENT)
Dept: PERIOP | Facility: HOSPITAL | Age: 71
End: 2021-04-04
Payer: COMMERCIAL

## 2021-04-04 NOTE — ANESTHESIA PREPROCEDURE EVALUATION
Procedure:  ARTHROPLASTY KNEE TOTAL (Right Knee)    Relevant Problems   CARDIO   (+) Hypertensive urgency      HEMATOLOGY   (+) Anemia      NEURO/PSYCH   (+) History of hemorrhagic cerebrovascular accident (CVA) without residual deficits   (+) History of nocturia        Physical Exam    Airway    Mallampati score: II  TM Distance: >3 FB  Neck ROM: full     Dental   No notable dental hx     Cardiovascular  Rhythm: regular, Rate: normal, Cardiovascular exam normal    Pulmonary  Pulmonary exam normal Breath sounds clear to auscultation,     Other Findings        Anesthesia Plan  ASA Score- 3     Anesthesia Type- IV sedation with anesthesia, regional and spinal with ASA Monitors  Additional Monitors:   Airway Plan:           Plan Factors-Exercise tolerance (METS): >4 METS  Chart reviewed  Existing labs reviewed  Patient summary reviewed  Patient is not a current smoker  Induction- intravenous  Postoperative Plan- Plan for postoperative opioid use  Informed Consent- Anesthetic plan and risks discussed with patient  I personally reviewed this patient with the CRNA  Discussed and agreed on the Anesthesia Plan with the CRNA  Betito Tobin

## 2021-04-05 ENCOUNTER — APPOINTMENT (OUTPATIENT)
Dept: RADIOLOGY | Facility: HOSPITAL | Age: 71
End: 2021-04-05
Payer: COMMERCIAL

## 2021-04-05 ENCOUNTER — HOSPITAL ENCOUNTER (OUTPATIENT)
Facility: HOSPITAL | Age: 71
Setting detail: OUTPATIENT SURGERY
Discharge: HOME | End: 2021-04-06
Attending: ORTHOPAEDIC SURGERY | Admitting: ORTHOPAEDIC SURGERY
Payer: COMMERCIAL

## 2021-04-05 ENCOUNTER — ANESTHESIA (OUTPATIENT)
Dept: PERIOP | Facility: HOSPITAL | Age: 71
End: 2021-04-05
Payer: COMMERCIAL

## 2021-04-05 DIAGNOSIS — Z96.651 S/P TOTAL KNEE REPLACEMENT USING CEMENT, RIGHT: ICD-10-CM

## 2021-04-05 DIAGNOSIS — Z86.73 HISTORY OF HEMORRHAGIC CEREBROVASCULAR ACCIDENT (CVA) WITHOUT RESIDUAL DEFICITS: ICD-10-CM

## 2021-04-05 DIAGNOSIS — Z98.890 STATUS POST RIGHT INGUINAL HERNIA REPAIR: ICD-10-CM

## 2021-04-05 DIAGNOSIS — Z87.19 STATUS POST RIGHT INGUINAL HERNIA REPAIR: ICD-10-CM

## 2021-04-05 DIAGNOSIS — Z96.651 STATUS POST TOTAL KNEE REPLACEMENT USING CEMENT, RIGHT: Primary | ICD-10-CM

## 2021-04-05 DIAGNOSIS — I10 ESSENTIAL HYPERTENSION: ICD-10-CM

## 2021-04-05 PROBLEM — F41.9 ANXIETY: Status: ACTIVE | Noted: 2021-04-05

## 2021-04-05 PROBLEM — E78.5 HYPERLIPIDEMIA: Status: ACTIVE | Noted: 2021-04-05

## 2021-04-05 PROCEDURE — 27447 TOTAL KNEE ARTHROPLASTY: CPT | Performed by: ORTHOPAEDIC SURGERY

## 2021-04-05 PROCEDURE — 73560 X-RAY EXAM OF KNEE 1 OR 2: CPT

## 2021-04-05 PROCEDURE — C1776 JOINT DEVICE (IMPLANTABLE): HCPCS | Performed by: ORTHOPAEDIC SURGERY

## 2021-04-05 PROCEDURE — 97110 THERAPEUTIC EXERCISES: CPT

## 2021-04-05 PROCEDURE — 27447 TOTAL KNEE ARTHROPLASTY: CPT | Performed by: PHYSICIAN ASSISTANT

## 2021-04-05 PROCEDURE — 99024 POSTOP FOLLOW-UP VISIT: CPT | Performed by: ORTHOPAEDIC SURGERY

## 2021-04-05 PROCEDURE — C1713 ANCHOR/SCREW BN/BN,TIS/BN: HCPCS | Performed by: ORTHOPAEDIC SURGERY

## 2021-04-05 PROCEDURE — 97163 PT EVAL HIGH COMPLEX 45 MIN: CPT

## 2021-04-05 PROCEDURE — C9290 INJ, BUPIVACAINE LIPOSOME: HCPCS | Performed by: ANESTHESIOLOGY

## 2021-04-05 DEVICE — ATTUNE KNEE SYSTEM TIBIAL BASE FIXED BEARING SIZE 7 CEMENTED
Type: IMPLANTABLE DEVICE | Site: KNEE | Status: FUNCTIONAL
Brand: ATTUNE

## 2021-04-05 DEVICE — ATTUNE KNEE SYSTEM FEMORAL CRUCIATE RETAINING SIZE 7 RIGHT CEMENTED
Type: IMPLANTABLE DEVICE | Site: KNEE | Status: FUNCTIONAL
Brand: ATTUNE

## 2021-04-05 DEVICE — ATTUNE KNEE SYSTEM TIBIAL INSERT FIXED BEARING CRUCIATE RETAINING 7 7MM AOX
Type: IMPLANTABLE DEVICE | Site: KNEE | Status: FUNCTIONAL
Brand: ATTUNE

## 2021-04-05 DEVICE — PALACOS® R IS A FAST-CURING, RADIOPAQUE, POLY(METHYL METHACRYLATE)-BASED BONE CEMENT.PALACOS ® R CONTAINS THE X-RAY CONTRAST MEDIUM ZIRCONIUM DIOXIDE. TO IMPROVE VISIBILITY IN THE SURGICAL FIELD PALACOS ® R HAS BEEN COLOURED WITH CHLOROPHYLL (E141). THE BONE CEMENT IS PREPARED DIRECTLY BEFORE USE BY MIXING A POLYMER POWDER COMPONENT WITH A LIQUID MONOMER COMPONENT. A DUCTILE DOUGH FORMS WHICH CURES WITHIN A FEW MINUTES.
Type: IMPLANTABLE DEVICE | Status: FUNCTIONAL
Brand: PALACOS®

## 2021-04-05 DEVICE — ATTUNE PATELLA MEDIALIZED DOME 35MM CEMENTED AOX
Type: IMPLANTABLE DEVICE | Site: KNEE | Status: FUNCTIONAL
Brand: ATTUNE

## 2021-04-05 RX ORDER — ATORVASTATIN CALCIUM 10 MG/1
10 TABLET, FILM COATED ORAL
Status: DISCONTINUED | OUTPATIENT
Start: 2021-04-05 | End: 2021-04-06 | Stop reason: HOSPADM

## 2021-04-05 RX ORDER — PROPOFOL 10 MG/ML
INJECTION, EMULSION INTRAVENOUS CONTINUOUS PRN
Status: DISCONTINUED | OUTPATIENT
Start: 2021-04-05 | End: 2021-04-05

## 2021-04-05 RX ORDER — GABAPENTIN 300 MG/1
300 CAPSULE ORAL ONCE
Status: COMPLETED | OUTPATIENT
Start: 2021-04-05 | End: 2021-04-05

## 2021-04-05 RX ORDER — DEXAMETHASONE SODIUM PHOSPHATE 4 MG/ML
10 INJECTION, SOLUTION INTRA-ARTICULAR; INTRALESIONAL; INTRAMUSCULAR; INTRAVENOUS; SOFT TISSUE ONCE
Status: COMPLETED | OUTPATIENT
Start: 2021-04-06 | End: 2021-04-06

## 2021-04-05 RX ORDER — PANTOPRAZOLE SODIUM 40 MG/1
40 TABLET, DELAYED RELEASE ORAL DAILY
Status: DISCONTINUED | OUTPATIENT
Start: 2021-04-05 | End: 2021-04-06 | Stop reason: HOSPADM

## 2021-04-05 RX ORDER — SCOLOPAMINE TRANSDERMAL SYSTEM 1 MG/1
1 PATCH, EXTENDED RELEASE TRANSDERMAL ONCE
Status: DISCONTINUED | OUTPATIENT
Start: 2021-04-05 | End: 2021-04-06 | Stop reason: HOSPADM

## 2021-04-05 RX ORDER — CEFAZOLIN SODIUM 2 G/50ML
2000 SOLUTION INTRAVENOUS EVERY 8 HOURS
Status: COMPLETED | OUTPATIENT
Start: 2021-04-05 | End: 2021-04-06

## 2021-04-05 RX ORDER — DOCUSATE SODIUM 100 MG/1
100 CAPSULE, LIQUID FILLED ORAL 2 TIMES DAILY
Status: DISCONTINUED | OUTPATIENT
Start: 2021-04-05 | End: 2021-04-06 | Stop reason: HOSPADM

## 2021-04-05 RX ORDER — CHLORHEXIDINE GLUCONATE 0.12 MG/ML
15 RINSE ORAL ONCE
Status: COMPLETED | OUTPATIENT
Start: 2021-04-05 | End: 2021-04-05

## 2021-04-05 RX ORDER — ACETAMINOPHEN 325 MG/1
975 TABLET ORAL ONCE
Status: COMPLETED | OUTPATIENT
Start: 2021-04-05 | End: 2021-04-05

## 2021-04-05 RX ORDER — CEFAZOLIN SODIUM 1 G/3ML
INJECTION, POWDER, FOR SOLUTION INTRAMUSCULAR; INTRAVENOUS AS NEEDED
Status: DISCONTINUED | OUTPATIENT
Start: 2021-04-05 | End: 2021-04-05

## 2021-04-05 RX ORDER — LIDOCAINE HYDROCHLORIDE 10 MG/ML
INJECTION, SOLUTION EPIDURAL; INFILTRATION; INTRACAUDAL; PERINEURAL AS NEEDED
Status: DISCONTINUED | OUTPATIENT
Start: 2021-04-05 | End: 2021-04-05

## 2021-04-05 RX ORDER — FENTANYL CITRATE/PF 50 MCG/ML
25 SYRINGE (ML) INJECTION
Status: DISCONTINUED | OUTPATIENT
Start: 2021-04-05 | End: 2021-04-05 | Stop reason: HOSPADM

## 2021-04-05 RX ORDER — CHLORHEXIDINE GLUCONATE 0.12 MG/ML
15 RINSE ORAL ONCE
Status: DISCONTINUED | OUTPATIENT
Start: 2021-04-05 | End: 2021-04-05 | Stop reason: HOSPADM

## 2021-04-05 RX ORDER — BUPIVACAINE HYDROCHLORIDE 7.5 MG/ML
INJECTION, SOLUTION EPIDURAL; RETROBULBAR AS NEEDED
Status: DISCONTINUED | OUTPATIENT
Start: 2021-04-05 | End: 2021-04-05

## 2021-04-05 RX ORDER — SODIUM CHLORIDE 9 MG/ML
75 INJECTION, SOLUTION INTRAVENOUS CONTINUOUS
Status: DISCONTINUED | OUTPATIENT
Start: 2021-04-05 | End: 2021-04-06 | Stop reason: ALTCHOICE

## 2021-04-05 RX ORDER — OXYCODONE HYDROCHLORIDE 10 MG/1
10 TABLET ORAL EVERY 4 HOURS PRN
Status: DISCONTINUED | OUTPATIENT
Start: 2021-04-05 | End: 2021-04-06 | Stop reason: HOSPADM

## 2021-04-05 RX ORDER — ONDANSETRON 2 MG/ML
INJECTION INTRAMUSCULAR; INTRAVENOUS AS NEEDED
Status: DISCONTINUED | OUTPATIENT
Start: 2021-04-05 | End: 2021-04-05

## 2021-04-05 RX ORDER — BUPIVACAINE HYDROCHLORIDE 7.5 MG/ML
INJECTION, SOLUTION INTRASPINAL AS NEEDED
Status: DISCONTINUED | OUTPATIENT
Start: 2021-04-05 | End: 2021-04-05

## 2021-04-05 RX ORDER — PROPOFOL 10 MG/ML
INJECTION, EMULSION INTRAVENOUS AS NEEDED
Status: DISCONTINUED | OUTPATIENT
Start: 2021-04-05 | End: 2021-04-05

## 2021-04-05 RX ORDER — DOCUSATE SODIUM 100 MG/1
100 CAPSULE, LIQUID FILLED ORAL 2 TIMES DAILY
Status: DISCONTINUED | OUTPATIENT
Start: 2021-04-05 | End: 2021-04-05 | Stop reason: SDUPTHER

## 2021-04-05 RX ORDER — DEXAMETHASONE SODIUM PHOSPHATE 4 MG/ML
INJECTION, SOLUTION INTRA-ARTICULAR; INTRALESIONAL; INTRAMUSCULAR; INTRAVENOUS; SOFT TISSUE AS NEEDED
Status: DISCONTINUED | OUTPATIENT
Start: 2021-04-05 | End: 2021-04-05

## 2021-04-05 RX ORDER — ACETAMINOPHEN 325 MG/1
975 TABLET ORAL EVERY 8 HOURS
Status: DISCONTINUED | OUTPATIENT
Start: 2021-04-05 | End: 2021-04-06 | Stop reason: HOSPADM

## 2021-04-05 RX ORDER — AMLODIPINE BESYLATE 5 MG/1
5 TABLET ORAL 2 TIMES DAILY
Status: DISCONTINUED | OUTPATIENT
Start: 2021-04-05 | End: 2021-04-06 | Stop reason: HOSPADM

## 2021-04-05 RX ORDER — SODIUM CHLORIDE, SODIUM LACTATE, POTASSIUM CHLORIDE, CALCIUM CHLORIDE 600; 310; 30; 20 MG/100ML; MG/100ML; MG/100ML; MG/100ML
125 INJECTION, SOLUTION INTRAVENOUS CONTINUOUS
Status: DISCONTINUED | OUTPATIENT
Start: 2021-04-05 | End: 2021-04-05

## 2021-04-05 RX ORDER — ASPIRIN 325 MG
325 TABLET ORAL 2 TIMES DAILY
Status: DISCONTINUED | OUTPATIENT
Start: 2021-04-05 | End: 2021-04-06 | Stop reason: HOSPADM

## 2021-04-05 RX ORDER — MIDAZOLAM HYDROCHLORIDE 2 MG/2ML
INJECTION, SOLUTION INTRAMUSCULAR; INTRAVENOUS AS NEEDED
Status: DISCONTINUED | OUTPATIENT
Start: 2021-04-05 | End: 2021-04-05

## 2021-04-05 RX ORDER — HYDROMORPHONE HCL/PF 1 MG/ML
0.4 SYRINGE (ML) INJECTION
Status: DISCONTINUED | OUTPATIENT
Start: 2021-04-05 | End: 2021-04-05 | Stop reason: HOSPADM

## 2021-04-05 RX ORDER — OXYCODONE HYDROCHLORIDE 5 MG/1
5 TABLET ORAL EVERY 4 HOURS PRN
Status: DISCONTINUED | OUTPATIENT
Start: 2021-04-05 | End: 2021-04-06 | Stop reason: HOSPADM

## 2021-04-05 RX ORDER — EPHEDRINE SULFATE 50 MG/ML
INJECTION INTRAVENOUS AS NEEDED
Status: DISCONTINUED | OUTPATIENT
Start: 2021-04-05 | End: 2021-04-05

## 2021-04-05 RX ORDER — SODIUM CHLORIDE 9 MG/ML
INJECTION, SOLUTION INTRAVENOUS AS NEEDED
Status: DISCONTINUED | OUTPATIENT
Start: 2021-04-05 | End: 2021-04-05 | Stop reason: HOSPADM

## 2021-04-05 RX ORDER — ONDANSETRON 2 MG/ML
4 INJECTION INTRAMUSCULAR; INTRAVENOUS ONCE AS NEEDED
Status: DISCONTINUED | OUTPATIENT
Start: 2021-04-05 | End: 2021-04-05 | Stop reason: HOSPADM

## 2021-04-05 RX ORDER — BUPIVACAINE HYDROCHLORIDE 2.5 MG/ML
INJECTION, SOLUTION EPIDURAL; INFILTRATION; INTRACAUDAL AS NEEDED
Status: DISCONTINUED | OUTPATIENT
Start: 2021-04-05 | End: 2021-04-05 | Stop reason: HOSPADM

## 2021-04-05 RX ORDER — ONDANSETRON 2 MG/ML
4 INJECTION INTRAMUSCULAR; INTRAVENOUS EVERY 6 HOURS PRN
Status: DISCONTINUED | OUTPATIENT
Start: 2021-04-05 | End: 2021-04-06 | Stop reason: HOSPADM

## 2021-04-05 RX ORDER — GABAPENTIN 100 MG/1
200 CAPSULE ORAL 2 TIMES DAILY
Status: DISCONTINUED | OUTPATIENT
Start: 2021-04-05 | End: 2021-04-06 | Stop reason: HOSPADM

## 2021-04-05 RX ORDER — OXYCODONE HCL 10 MG/1
10 TABLET, FILM COATED, EXTENDED RELEASE ORAL ONCE
Status: COMPLETED | OUTPATIENT
Start: 2021-04-05 | End: 2021-04-05

## 2021-04-05 RX ORDER — LANOLIN ALCOHOL/MO/W.PET/CERES
3 CREAM (GRAM) TOPICAL
Status: DISCONTINUED | OUTPATIENT
Start: 2021-04-05 | End: 2021-04-06 | Stop reason: HOSPADM

## 2021-04-05 RX ORDER — HYDROMORPHONE HCL/PF 1 MG/ML
0.5 SYRINGE (ML) INJECTION EVERY 2 HOUR PRN
Status: DISCONTINUED | OUTPATIENT
Start: 2021-04-05 | End: 2021-04-06 | Stop reason: HOSPADM

## 2021-04-05 RX ORDER — MAGNESIUM HYDROXIDE 1200 MG/15ML
LIQUID ORAL AS NEEDED
Status: DISCONTINUED | OUTPATIENT
Start: 2021-04-05 | End: 2021-04-05 | Stop reason: HOSPADM

## 2021-04-05 RX ORDER — ESCITALOPRAM OXALATE 5 MG/1
5 TABLET ORAL
Status: DISCONTINUED | OUTPATIENT
Start: 2021-04-05 | End: 2021-04-06 | Stop reason: HOSPADM

## 2021-04-05 RX ORDER — MAGNESIUM HYDROXIDE/ALUMINUM HYDROXICE/SIMETHICONE 120; 1200; 1200 MG/30ML; MG/30ML; MG/30ML
30 SUSPENSION ORAL EVERY 6 HOURS PRN
Status: DISCONTINUED | OUTPATIENT
Start: 2021-04-05 | End: 2021-04-06 | Stop reason: HOSPADM

## 2021-04-05 RX ORDER — CEFAZOLIN SODIUM 2 G/50ML
2000 SOLUTION INTRAVENOUS ONCE
Status: DISCONTINUED | OUTPATIENT
Start: 2021-04-05 | End: 2021-04-05 | Stop reason: HOSPADM

## 2021-04-05 RX ORDER — LABETALOL 100 MG/1
200 TABLET, FILM COATED ORAL EVERY 12 HOURS SCHEDULED
Status: DISCONTINUED | OUTPATIENT
Start: 2021-04-05 | End: 2021-04-06 | Stop reason: HOSPADM

## 2021-04-05 RX ADMIN — ONDANSETRON 4 MG: 2 INJECTION INTRAMUSCULAR; INTRAVENOUS at 10:24

## 2021-04-05 RX ADMIN — ACETAMINOPHEN 975 MG: 325 TABLET, FILM COATED ORAL at 15:20

## 2021-04-05 RX ADMIN — CEFAZOLIN SODIUM 2000 MG: 2 SOLUTION INTRAVENOUS at 17:43

## 2021-04-05 RX ADMIN — PROPOFOL 40 MG: 10 INJECTION, EMULSION INTRAVENOUS at 10:24

## 2021-04-05 RX ADMIN — MIDAZOLAM 1 MG: 1 INJECTION INTRAMUSCULAR; INTRAVENOUS at 10:08

## 2021-04-05 RX ADMIN — DEXAMETHASONE SODIUM PHOSPHATE 4 MG: 4 INJECTION, SOLUTION INTRA-ARTICULAR; INTRALESIONAL; INTRAMUSCULAR; INTRAVENOUS; SOFT TISSUE at 10:24

## 2021-04-05 RX ADMIN — Medication 3 MG: at 21:41

## 2021-04-05 RX ADMIN — ESCITALOPRAM 5 MG: 5 TABLET, FILM COATED ORAL at 21:40

## 2021-04-05 RX ADMIN — LABETALOL HYDROCHLORIDE 200 MG: 100 TABLET ORAL at 21:37

## 2021-04-05 RX ADMIN — ACETAMINOPHEN 975 MG: 325 TABLET, FILM COATED ORAL at 21:38

## 2021-04-05 RX ADMIN — MIDAZOLAM 1 MG: 1 INJECTION INTRAMUSCULAR; INTRAVENOUS at 10:14

## 2021-04-05 RX ADMIN — DOCUSATE SODIUM 100 MG: 100 CAPSULE, LIQUID FILLED ORAL at 17:13

## 2021-04-05 RX ADMIN — OXYCODONE HYDROCHLORIDE 10 MG: 10 TABLET, FILM COATED, EXTENDED RELEASE ORAL at 07:32

## 2021-04-05 RX ADMIN — ACETAMINOPHEN 975 MG: 325 TABLET, FILM COATED ORAL at 07:32

## 2021-04-05 RX ADMIN — PANTOPRAZOLE SODIUM 40 MG: 40 TABLET, DELAYED RELEASE ORAL at 15:20

## 2021-04-05 RX ADMIN — AMLODIPINE BESYLATE 5 MG: 5 TABLET ORAL at 21:40

## 2021-04-05 RX ADMIN — PROPOFOL 80 MCG/KG/MIN: 10 INJECTION, EMULSION INTRAVENOUS at 10:24

## 2021-04-05 RX ADMIN — BUPIVACAINE HYDROCHLORIDE IN DEXTROSE 1.8 ML: 7.5 INJECTION, SOLUTION SUBARACHNOID at 10:23

## 2021-04-05 RX ADMIN — GABAPENTIN 300 MG: 300 CAPSULE ORAL at 07:32

## 2021-04-05 RX ADMIN — ASPIRIN 325 MG: 325 TABLET, FILM COATED ORAL at 21:40

## 2021-04-05 RX ADMIN — LIDOCAINE HYDROCHLORIDE 50 ML: 10 INJECTION, SOLUTION EPIDURAL; INFILTRATION; INTRACAUDAL; PERINEURAL at 10:24

## 2021-04-05 RX ADMIN — EPHEDRINE SULFATE 10 MG: 50 INJECTION, SOLUTION INTRAVENOUS at 10:45

## 2021-04-05 RX ADMIN — CEFAZOLIN 2000 MG: 1 INJECTION, POWDER, FOR SOLUTION INTRAVENOUS at 10:24

## 2021-04-05 RX ADMIN — PHENYLEPHRINE HYDROCHLORIDE 10 MCG/MIN: 10 INJECTION INTRAVENOUS at 10:46

## 2021-04-05 RX ADMIN — SCOPALAMINE 1 PATCH: 1 PATCH, EXTENDED RELEASE TRANSDERMAL at 07:32

## 2021-04-05 RX ADMIN — TRANEXAMIC ACID 1000 MG: 1 INJECTION, SOLUTION INTRAVENOUS at 10:26

## 2021-04-05 RX ADMIN — CHLORHEXIDINE GLUCONATE 0.12% ORAL RINSE 15 ML: 1.2 LIQUID ORAL at 07:32

## 2021-04-05 RX ADMIN — SODIUM CHLORIDE, SODIUM LACTATE, POTASSIUM CHLORIDE, AND CALCIUM CHLORIDE 125 ML/HR: .6; .31; .03; .02 INJECTION, SOLUTION INTRAVENOUS at 08:07

## 2021-04-05 RX ADMIN — ATORVASTATIN CALCIUM 10 MG: 10 TABLET, FILM COATED ORAL at 17:13

## 2021-04-05 RX ADMIN — SODIUM CHLORIDE 75 ML/HR: 0.9 INJECTION, SOLUTION INTRAVENOUS at 15:20

## 2021-04-05 RX ADMIN — GABAPENTIN 200 MG: 100 CAPSULE ORAL at 17:13

## 2021-04-05 NOTE — PLAN OF CARE
Problem: GENITOURINARY - ADULT  Goal: Maintains or returns to baseline urinary function  Description: INTERVENTIONS:  - Assess urinary function  - Encourage oral fluids to ensure adequate hydration if ordered  - Administer IV fluids as ordered to ensure adequate hydration  - Administer ordered medications as needed  - Offer frequent toileting  - Follow urinary retention protocol if ordered  Outcome: Progressing  Goal: Urinary catheter remains patent  Description: INTERVENTIONS:  - Assess patency of urinary catheter  - If patient has a chronic kern, consider changing catheter if non-functioning  - Follow guidelines for intermittent irrigation of non-functioning urinary catheter  Outcome: Progressing     Problem: SKIN/TISSUE INTEGRITY - ADULT  Goal: Incision(s), wounds(s) or drain site(s) healing without S/S of infection  Description: INTERVENTIONS  - Assess and document risk factors for skin impairment   - Assess and document dressing, incision, wound bed, drain sites and surrounding tissue  - Consider nutrition services referral as needed  - Oral mucous membranes remain intact  - Provide patient/ family education  Outcome: Progressing     Problem: HEMATOLOGIC - ADULT  Goal: Maintains hematologic stability  Description: INTERVENTIONS  - Assess for signs and symptoms of bleeding or hemorrhage  - Monitor labs  - Administer supportive blood products/factors as ordered and appropriate  Outcome: Progressing     Problem: MUSCULOSKELETAL - ADULT  Goal: Maintain or return mobility to safest level of function  Description: INTERVENTIONS:  - Assess patient's ability to carry out ADLs; assess patient's baseline for ADL function and identify physical deficits which impact ability to perform ADLs (bathing, care of mouth/teeth, toileting, grooming, dressing, etc )  - Assess/evaluate cause of self-care deficits   - Assess range of motion  - Assess patient's mobility  - Assess patient's need for assistive devices and provide as appropriate  - Encourage maximum independence but intervene and supervise when necessary  - Involve family in performance of ADLs  - Assess for home care needs following discharge   - Consider OT consult to assist with ADL evaluation and planning for discharge  - Provide patient education as appropriate  Outcome: Progressing  Goal: Maintain proper alignment of affected body part  Description: INTERVENTIONS:  - Support, maintain and protect limb and body alignment  - Provide patient/ family with appropriate education  Outcome: Progressing

## 2021-04-05 NOTE — ANESTHESIA POSTPROCEDURE EVALUATION
Post-Op Assessment Note    CV Status:  Stable  Pain Score: 0    Pain management: adequate     Mental Status:  Awake and sleepy   Hydration Status:  Euvolemic   PONV Controlled:  Controlled   Airway Patency:  Patent      Post Op Vitals Reviewed: Yes      Staff: CRNA         No complications documented      BP   114/65   Temp      Pulse  58   Resp   16   SpO2 97% fm 4lpm

## 2021-04-05 NOTE — DISCHARGE INSTRUCTIONS
Total Knee Replacement     WHAT YOU SHOULD KNOW:   Total knee replacement is surgery to replace a knee joint damaged by wear, injury, or disease  It is also called a total knee arthroplasty  The knee joint is where your femur (thigh bone) and tibia (large lower leg bone or shin bone) meet  A small bone called the patella (kneecap) protects your knee joint  AFTER YOU LEAVE:     Medicines:   · Pain medicine: You may be given a prescription medicine to decrease pain  Do not wait until the pain is severe before you take this medicine  We recommend that you take Tylenol 975mg every 8 hours for baseline pain control  Oxycodone can be used as needed, but no more than 1-2 tablets every 4 to 6 hours  · Stool softeners  make it easier for you to have a bowel movement  You may need this medicine to treat or prevent constipation  You will be given Colace 100mg to take twice a day    · Anticoagulants  are a type of blood thinner medicine that helps prevent clots  Clots can cause strokes, heart attacks, and death  These medicines may cause you to bleed or bruise more easily  You will be given aspirin 325 mg to take twice a day for 6 weeks after surgery  ¨ Watch for bleeding from your gums or nose  Watch for blood in your urine and bowel movements  Use a soft washcloth and a soft toothbrush  If you shave, use an electric razor  Avoid activities that can cause bruising or bleeding  · Take your medicine as directed  Call your healthcare provider if you think your medicine is not helping or if you have side effects  Tell him if you are allergic to any medicine  Keep a list of the medicines, vitamins, and herbs you take  Include the amounts, and when and why you take them  Bring the list or the pill bottles to follow-up visits  Carry your medicine list with you in case of an emergency  Follow up with your orthopedist as directed:   You may need to return to have your wound checked and stitches, staples, or drain removed  Write down your questions so you remember to ask them during your visits  Please make an appointment to see Dr Dashawn Montemayor 2 weeks postoperatively  Physical therapy:  A physical therapist teaches you exercises to help improve movement and strength, and to decrease pain  You will begin outpatient physical therapy later this week as planned  Self-care:   · Wound Care:  Please leave the Mepilex dressing in place for 7 days after surgery  After 7 days, you may remove the dressing and leave the incision open to air  Do not get the dressing or the incision wet until your seen in the office  If the incision is uncomfortable under clothing after the Mepilex is removed, you may cover it with a a dry sterile dressing, but should remain dry at all times  · Use ice:  Ice helps decrease swelling and pain  Ice may also help prevent tissue damage  Use an ice pack or put crushed ice in a plastic bag  Cover it with a towel, and place it on your knee for 15 to 20 minutes every hour as directed  · Wear pressure stockings: These are long, tight stockings that put pressure on your legs to promote blood flow and prevent clots  · Use a cane, walker, or crutches as directed: These devices will help decrease your risk of falling  Contact your orthopedist if:  · You have a fever over 101 0°F     · You have trouble moving or bending your joint  · You have increased pain and swelling in your joint, even after you take pain medicine  · You have back pain or lower leg pain when you bend your foot upwards  · You have questions or concerns about your condition or care  Seek immediate care or call 911 if:   · You feel like you are going to faint  · Blood soaks through/saturates your bandage  · Your incision comes apart  · Your incision is red, swollen, or draining pus  · You cannot walk or move your joint, or the limb is numb  · Your arm or leg feels warm, tender, and painful   It may look swollen and red  · You have a seizure or feel confused  · You feel lightheaded, short of breath, and have chest pain  · You have chest pain when you take a deep breath or cough  You may cough up blood  © 2014 [address] is for End User's use only and may not be sold, redistributed or otherwise used for commercial purposes  All illustrations and images included in CareNotes® are the copyrighted property of A D A M [employer] Inc  or Yves Messer  The above information is an  only  It is not intended as medical advice for individual conditions or treatments  Talk to your doctor, nurse or pharmacist before following any medical regimen to see if it is safe and effective for you

## 2021-04-05 NOTE — ASSESSMENT & PLAN NOTE
- received Ancef 2 g IV x 2 for 24 hours post-op    - orthopedic surgery for post-op management  - monitor for any fever   - PT/OT  - DVT ppx ordered  - analgesia per ortho surgery orders  - dressing change as directed per orders  - DCP for tomorrow with PT eval

## 2021-04-05 NOTE — ANESTHESIA PROCEDURE NOTES
Spinal Block    Patient location during procedure: OR  Start time: 4/5/2021 10:23 AM  Reason for block: procedure for pain, at surgeon's request and primary anesthetic  Staffing  Anesthesiologist: Macie Arceo MD  Resident/CRNA: Rima Kerr CRNA  Performed: anesthesiologist and resident/CRNA   Preanesthetic Checklist  Completed: patient identified, site marked, surgical consent, pre-op evaluation, timeout performed, IV checked, risks and benefits discussed and monitors and equipment checked  Spinal Block  Patient position: sitting  Prep: Betadine  Patient monitoring: cardiac monitor, frequent blood pressure checks and continuous pulse ox  Approach: right paramedian  Location: L3-4  Injection technique: single-shot  Needle  Needle type: pencil-tip   Needle gauge: 25 G  Needle length: 10 cm  Assessment  Sensory level: T10  Injection Assessment:  negative aspiration for heme, no paresthesia on injection and positive aspiration for clear CSF  Post-procedure:  site cleaned  Additional Notes  Pt tolerated procedure well

## 2021-04-05 NOTE — INTERVAL H&P NOTE
H&P reviewed  After examining the patient I find no changes in the patients orthopedic exam   He has been seen and cleared by his PCP and cardiologist   He will be a good candidate for outpatient physical therapy following his discharge from the hospital   He will be a good candidate for aspirin postoperatively for DVT prophylaxis  Proceed to OR for right total knee arthroplasty      Vitals:    04/05/21 0619   BP: 158/87   Pulse: 61   Resp: 16   Temp: 97 8 °F (36 6 °C)   SpO2: 97%

## 2021-04-05 NOTE — PHYSICAL THERAPY NOTE
PT EVALUATION       04/05/21 1505   Note Type   Note type Evaluation   Pain Assessment   Pain Assessment Tool Pain Assessment not indicated - pt denies pain   Home Living   Type of Home House   Home Layout Multi-level  (few steps to enter, bedroom down 1 flight of stairs, main living level first floor)   Home Equipment Other (Comment)  (none)   Prior Function   Level of [place] Independent with ADLs and functional mobility   Lives With Spouse  (daughter Sol Imani)   ADL Assistance Independent   Restrictions/Precautions   Weight Bearing Precautions Per Order Yes   RLE Weight Bearing Per Order WBAT   Other Precautions Bed Alarm; Chair Alarm;Pain; Fall Risk  (speaks panjabi but some english)   General   Additional Pertinent History Pt is POD zero s/p R TKA  Family/Caregiver Present   (Pt and wife are in the same room, both  having had TKA today)   Cognition   Overall Cognitive Status WFL   RLE Assessment   RLE Assessment   (knee ROM 0-90, MMT hip 3/5, knee 3/5, ankle 3/5)   LLE Assessment   LLE Assessment WNL   Bed Mobility   Supine to Sit 4  Minimal assistance   Sit to Supine 4  Minimal assistance   Additional items LE management   Transfers   Sit to Stand 4  Minimal assistance   Additional items Verbal cues/tactile cues for hand placement; Increased time required   Stand to Sit 4  Minimal assistance   Additional items Verbal cues; Increased time required   Stand pivot 4  Minimal assistance   Additional items Verbal cues; Increased time required  (rolling walker)   Ambulation/Elevation   Gait pattern Inconsistent west   Gait Assistance 4  Minimal assist   Assistive Device Rolling walker   Distance 2 feet    Balance   Static Sitting Fair +   Dynamic Sitting Fair   Static Standing Fair -   Dynamic Standing Poor   Ambulatory Poor   Activity Tolerance   Activity Tolerance Patient tolerated treatment well   Assessment   Prognosis Good   Problem List Decreased strength;Decreased range of motion; Impaired balance;Pain;Decreased mobility   Assessment Patient seen for Physical Therapy evaluation  Patient admitted with Status post total knee replacement using cement, right  Comorbidities affecting patient's physical performance include: anemia  Personal factors affecting patient at time of initial evaluation include: lives in 2 story house, stairs to enter home, inability to ambulate household distances, inability to navigate level surfaces without external assistance, preferred language not English (language barrier) and inability to perform ADLS  Prior to admission, patient was independent with functional mobility without assistive device and independent with ADLS  Please find objective findings from Physical Therapy assessment regarding body systems outlined above with impairments and limitations including weakness, decreased ROM, gait deviations, pain, decreased activity tolerance, decreased functional mobility tolerance and fall risk  The Barthel Index was used as a functional outcome tool presenting with a score of 35 today indicating marked limitations of functional mobility and ADLS  Patient's clinical presentation is currently unstable/unpredictable as seen in patient's presentation of changing level of pain, increased fall risk, new onset of impairment of functional mobility and new onset of weakness  Pt would benefit from continued Physical Therapy treatment to address deficits as defined above and maximize level of functional mobility  As demonstrated by objective findings, the assigned level of complexity for this evaluation is high  The patient's AM-PAC Basic Mobility Inpatient Short Form Raw Score is 13, Standardized Score is 33 99  A standardized score less than 42 9 suggests the patient may benefit from discharge to post-acute rehabilitation services, however anticipate as pt is POD zero s/p R TKA, pt will make rapid progress and be able to go home with OP PT and family support     Goals   Patient Goals walk without pain   STG Expiration Date 04/12/21   Short Term Goal #1 Improved bed mobility to independent, improved transfers to independent, improved ambulation with a rolling walker on indoor level surfaces x 100 ft independent, independent up and down 1 flight of steps the patient can get to and from his bedroom, and independent home exercise program   LTG Expiration Date 04/19/21   Long Term Goal #1 Improved right knee range of motion from 0-105 degrees, improve right knee strength to at least 4+ out of 5, patient will ambulate independently outdoor level surfaces with a rolling walker x 150 ft with a normal gait   Plan   Treatment/Interventions ADL retraining;Functional transfer training;LE strengthening/ROM; Elevations; Therapeutic exercise;Gait training;Bed mobility; Equipment eval/education;Patient/family training   PT Frequency Twice a day   Recommendation   PT Discharge Recommendation Other (Comment)  (OP PT)   Equipment Recommended   (rolling walker/CM aware)   AM-PAC Basic Mobility Inpatient   Turning in Bed Without Bedrails 3   Lying on Back to Sitting on Edge of Flat Bed 3   Moving Bed to Chair 2   Standing Up From Chair 2   Walk in Room 2   Climb 3-5 Stairs 1   Basic Mobility Inpatient Raw Score 13   Basic Mobility Standardized Score 33 99   Barthel Index   Feeding 10   Bathing 0   Grooming Score 0   Dressing Score 5   Bladder Score 0   Bowels Score 10   Toilet Use Score 5   Transfers (Bed/Chair) Score 5   Mobility (Level Surface) Score 0   Stairs Score 0   Barthel Index Score 28   Licensure   [place] License Number  Anjana Ochoa PT [id number]       Time In:1605  Time MLR:[id number]  Total Time: 10      S:  "I feel good"  O:  Pt transferred from sit to stand with min assist from an elevated bed and ambulated 20 feet with a rolling walker in the room  X 20 each B ankle pumps and LAQ  A:  Pt tolerated POD zero R TKA well   Anticipate pt should be able to go home with his wife and daughter POD 1 if medically appropriate    P:  Continue PT, plan to assess stair climbing in the AM/also issue written HEP    Sandrine Montaño PT

## 2021-04-05 NOTE — PROGRESS NOTES
Progress Note - Orthopedics   Alberto Medina 65 y o  male MRN: [id number]  Unit/Bed#: OR POOL Encounter: [id number]    Assessment:  1) POD#0 s/p R TKA    Plan:  Ancef 2 g IV x 2 for 24 hours postop  DVT prophylaxis: ASA 325mg PO BID/SCD's/Ambulation  WBAT RLE  Monitor for resolution of spinal  PT/OT- WBAT RLE  Analgesia PRN  Follow up AM labs  D/c kern in AM POD #1  Consult to [facility] - post op medical management, patient known to you  Dressing- monitor for drainage  Discharge planning - disposition pending progress with PT  Plan for discharge home tomorrow to start outpatient physical therapy later this week    Weight bearing: WBAT RLE    VTE Pharmacologic Prophylaxis: ASA 325mg PO BID  VTE Mechanical Prophylaxis: sequential compression device    Subjective:  Patient seen examined postoperatively  Patient appears comfortable  Events of surgery discussed with the patient and the patient's family  Vitals: Blood pressure 158/87, pulse 61, temperature 97 8 °F (36 6 °C), temperature source Temporal, resp  rate 16, height 6' 2" (1 88 m), weight 77 7 kg (171 lb 3 2 oz), SpO2 97 %  ,Body mass index is 21 98 kg/m²  Intake/Output Summary (Last 24 hours) at 4/5/2021 1307  Last data filed at 4/5/2021 1253  Gross per 24 hour   Intake 900 ml   Output 220 ml   Net 680 ml       Invasive Devices     Peripheral Intravenous Line            Peripheral IV 04/05/21 Right Wrist less than 1 day          Drain            Urethral Catheter Latex 16 Fr  less than 1 day                Physical Exam: NAD  Ortho Exam: RLE: Dsg c/d/i, compartments soft, calf non-tender, decreased PF/DF/EHL, decreased DP/SP/Saph/Sural SILT, DP 2+, foot warm    Lab, Imaging and other studies: PO XR R knee:  Reviewed and acceptable    Darren NAGEL    Division of Adult Reconstruction  Department of Orthopaedic Surgery  [facility]

## 2021-04-05 NOTE — ASSESSMENT & PLAN NOTE
Stable  Continue home medications and monitor VS  - c/w amlodipine 5 mg bid and labetalol 200 mg b i d    - will hold Cozaar due to the risk of hypotension   Currently normotensive

## 2021-04-05 NOTE — OP NOTE
OPERATIVE REPORT  PATIENT NAME: Mauricio Blair    :    MRN: [id number]  Pt Location: [facility] OR ROOM 01    SURGERY DATE: 2021    Surgeon(s) and Role:     * Sharon Portillo DO - Primary     * Gilson Ojeda PA-C - Assisting, no qualified resident was available an assistant was needed for patient positioning, prepping and draping, soft tissue retraction, protection of vital structures throughout the case, and to complete the case safely  Macho Limon,  YANET/OTC - 2nd Assist    Preop Diagnosis:  Primary osteoarthritis right knee  Chronic right knee pain    Post-Op Diagnosis Codes:     * Primary osteoarthritis right knee     * Chronic right knee pain    Procedure(s) (LRB):  ARTHROPLASTY KNEE TOTAL (Right)    Specimen(s):  * No specimens in log *    Estimated Blood Loss:   20 mL    Drains:  None  Urethral Catheter Latex 16 Fr  (Active)   Number of days: 0       Anesthesia Type:   Spinal with sedation, postoperative adductor canal block with Exparel    Antibiotics:  Ancef 2 g    Intravenous fluids:  900 cc    Urine output:  Grijalva:  300 cc    Tourniquet time:  81 minutes at 250 mmHg    Implants: Depuy Attune size 7 right CR femur, size 7 S+ tibia, size 7 AOX CR polyethylene, size 35 polyethylene patella    Operative Indications:  Primary osteoarthritis right knee  Chronic right knee pain  Patient is a very pleasant 64-year-old male known to me for treatment of his bilateral knee osteoarthritis  He had failed attempts at conservative measures of treatment in the past and his severe end-stage osteoarthritis of the right knee with valgus deformity continued to affect his overall quality of life, ability to ambulate appropriately, and affect his ability to carry out age appropriate ADLs  With these findings and the significance of his valgus deformity I recommended no longer pursuing conservative treatment I recommended that he undergo right total knee arthroplasty  He was agreeable to this    The risks and benefits of undergoing the procedure were discussed  The patient was seen and cleared by PCP and his cardiologist prior to the procedure  Patient was optimized for the intended procedure  Patient underwent right total knee arthroplasty on 4/5/2021  Operative Findings:  Preoperatively the patient had no flexion contracture  There was a passively correctable valgus deformity of approximately 10°  Intraoperatively there was evidence of significant grade 4 wear in all 3 compartments  There was erosion of the distal lateral femoral condyle however the posterior condylar axis was maintained medially and laterally  There was significant posterior lateral proximal tibial wear and erosion  Ultimately a cemented right total knee arthroplasty was successfully implanted without complication  Range of motion was from 0-130 degrees with excellent stability at 0° 30° 90°  The patella tracked midline and flat without lateral release  After closure of the arthrotomy range of motion, stability, patellar tracking were unchanged  Patient tolerated procedure well  There were no complications  Complications:   None    Procedure and Technique:  The patient was identified and marked in the preoperative holding area  Final questions were addressed  Consents were visualized for correct laterality and the intended procedure  Patient was taken back to the operating room where they were transferred to the operating room table and administered spinal  anesthesia by the anesthesia staff  Tourniquet was then applied to the right thigh  The right lower extremity was prepped and draped in the standard sterile fashion with the addition of the Monte knee positioner  The patient was administered 2 g of IV Ancef  Tranexamic acid  was administered by the anesthesia staff  Preoperatively the range of motion was from 0-120 degrees without evidence of flexion contracture  His 10 degree valgus deformity to passively correctable    A final timeout was performed and all members of the operating room staff were in agreement of the intended procedure and the correct laterality was confirmed  An anterior knee incision was marked over the anterior right knee and carried over the medial portion of the patella down medial to the tibial tubercle  The leg was exsanguinated and the tourniquet was inflated to 250 mmHg  An incision was made over the anterior knee using a scalpel, and sharp dissection was carried deep through Narendra's fascia creating full thickness flaps  The extensor mechanism was identified  A standard medial parapatellar arthrotomy was performed using a scalpel, and upon doing so benign-appearing yellow intra-articular fluid was expressed  The anterior horn of the medial and lateral meniscus was removed  50% of the patellar fat pad was removed for proper exposure  The distal arthrotomy was used to initiate a medial release around the proximal tibia at the joint line to the mid coronal plane  On inspection there was diffuse grade 4 degenerative change in the medial compartment, lateral compartment, and patellofemoral compartment  There was evidence of significant distal lateral femoral condyle wear in addition to significant posterior lateral proximal tibia wear and erosion  The osteophytes around the femur and proximal tibia had to be debulked to gain appropriate visualization  The osteophytes in the intercondylar notch were debulked utilizing a osteotome and mallet  This allowed for appropriate visualization of the intramedullary starting point  The intramedullary drill for the femur was introduced into the medullary canal anterior to the PCL  The distal femoral cutting jig was inserted after being set to 5° of valgus and a 9 mm resection  The distal femoral cutting jig was pinned in place at the 0 position  The distal femur was cut using an oscillating saw     at this level of resection there was enough bone resected from the distal lateral femoral condyle that appropriate bone was available for [facility] of the implant and augment would not need to be utilized  There was no evidence of posterior condylar wear or hypoplasia of the medial or lateral posterior femoral condyle  The distal femoral sizing rotation guide was applied to the distal femur and femoral rotation was set to match Artesian's line and confirmed with the epicondylar axis  This was found to be 3° of external rotation and its position was pinned  The femur was sized to be a size 6 in the AP plane however medially to laterally this appeared to be undersized  It was decided that a size 7 implant would be utilized for more appropriate distal femoral coverage  The sizing rotation guide was removed and a size 7 four-in-one cutting block was applied to the distal femur  Checking the anterior resection this appeared to be the appropriate size both in the AP and ML planes, and would not cause anterior femoral notching  The block was pinned in place and the anterior, posterior, posterior chamfer, and anterior chamfer cuts were made in succession more protecting all periarticular soft tissues and ligaments  The cutting block was removed and the perimeter of the femur was cleaned from remaining osteophytes using a rongeur  The remnants of the ACL and PCL were removed using electrocautery  Distal femur finishing guide was applied to the distal femur and the trochlea was completed  The tibia was subluxed anteriorly in preparation for the tibial resection  The remaining portions of the medial and lateral meniscus were removed using a 15 blade being sure not to violate the MCL or LCL  The area of the lateral geniculate artery was then cauterized using electrocautery  On inspection of the tibia there mild erosion of the medial tibial plateau and significant erosion of the posterior lateral tibial plateau    The tibial cutting jig and extra-medullary alignment guide was applied to the leg  The level of resection was set to the osteochondral junction of the proximal medial tibial plateau and confirmed using a stylus that the level of resection would encompass the lowest level of erosion of the posterior lateral tibia  The patient's native slope was evaluated and it was deemed that 5° of posterior slope would be appropriate  The jig was pinned in the medial lateral proximal tibia  Varus valgus alignment was confirmed and was appropriate at neutral to the mechanical axis  With the cutting jig pinned into place and proximal tibia was resected while protecting soft tissues  The proximal tibial osteophytes were removed  A trial tibial baseplate was placed upon the proximal tibia to check alignment and the alignment was unchanged and remained in neutral  The proximal tibia was sized and a size 7 baseplate was found to be appropriate and provided good cortical fit  The baseplate was pinned into place in appropriate rotation aligning with the medial third of the tibial tubercle  The tibia was then reamed, broached, and finished in sequence in preparation for trialing  Trialing was initiated with a size 5 polyethylene insert placed upon the size 7 tibial baseplate  The size 7 standard femur was then placed upon the femur and trialing began  The 7 mm polyethylene trial demonstrated balanced medial and lateral gaps in flexion and extension while maintaining full extension and reaching on 130 ° of flexion  Coronal stability testing revealed that medial lateral gaps were symmetric and extension, mid flexion, and full flexion  The knee demonstrated excellent stability with a range of motion from 0-130 ° of flexion  The femoral lug drills were drilled, and the trials were removed  Attention was then turned to the patella  The osteo synovial reflection was revealed using a Bovie  The patella height measured 27 millimeters prior to resection   The patella was sized and found to be a 35 mm patellar button  The cutting guide was set for the appropriate depth and the patella was evenly resected using oscillating saw  The 35 mm patellar button was then medialized over the patella and the lug holes were drilled  A trial patella button was placed upon the patella and the pre osteotomy patellar height was restored to 26  A lateral facetectomy of the patella was performed  The tibial trial was removed and the knee was irrigated and debris was removed  The soft tissues of the posterior capsule were cauterized using Bovie to ensure hemostasis  Remaining fragments of the posterior medial and lateral meniscus were removed  The posterior capsule and medial and lateral periarticular soft tissues were injected with a periarticular analgesic cocktail  The knee was again irrigated in preparation for cementation  The tibia was subluxed and all retractors were in place for cementation when final implants were confirmed and placed upon the back table  2 bags of [place] cement without gentamicin were mixed  And the final size 7  S+ tibial implant, 7  mm AOX CR conforming polyethylene insert, size 7 right CR femur, and 35 mm patella button were cemented in sequence  Excess cement was removed after each implant was in place  As the cement cured the remainder of the periarticular pain cocktail was injected into the soft tissues around the knee  Irrigation then followed  After the cement had cured the joint was inspected for any residual loose bodies of cement and these were removed  The tourniquet was released after 81 minutes at 250 mmHg  The tracking and stability of the final components were assessed  The patella tracked midline without tilt, and the knee was stable in both flexion and extension, coronal stability was unchanged, and the knee demonstrated range of motion from 0-130°  No lateral patellar release was needed    The knee was again irrigated and a very conservative partial synovectomy was performed  Hemostasis was achieved using electrocautery  Closure began using a #2 barbed bidirectional suture for the arthrotomy  The proximal distal aspects of the arthrotomy were oversewn with #2 Ethibond suture  After closure of the arthrotomy range of motion to gravity was tested and was found to be 0-130° of flexion  Quarter percent Marcaine plain was injected in the subcutaneous soft tissues  The deep subcutaneous tissues were reapproximated with undyed 0 Vicryl, the superficial subcutaneous tissues were reapproximated with undyed 2-0 Vicryl, the skin edges reapproximated with a running 3-0 bidirectional barbed Monocryl suture, and the skin edges were sealed with Dermabond  After the Dermabond had dried a silver impregnated Mepilex dressing was placed over the incision  The drapes were removed and JAMEE stockings were placed on the bilateral lower extremities  Patient was then awakened from anesthesia without difficulty, and transferred to PACU in stable condition          I was present for all critical portions of the procedure    Patient Disposition:  PACU     SIGNATURE: Cornelio Johnson DO  DATE: April 5, 2021  TIME: 12:56 PM

## 2021-04-06 ENCOUNTER — TELEPHONE (OUTPATIENT)
Dept: OBGYN CLINIC | Facility: HOSPITAL | Age: 71
End: 2021-04-06

## 2021-04-06 VITALS
TEMPERATURE: 99.5 F | HEIGHT: 74 IN | OXYGEN SATURATION: 98 % | BODY MASS INDEX: 21.97 KG/M2 | DIASTOLIC BLOOD PRESSURE: 68 MMHG | WEIGHT: 171.2 LBS | SYSTOLIC BLOOD PRESSURE: 122 MMHG | RESPIRATION RATE: 16 BRPM | HEART RATE: 61 BPM

## 2021-04-06 LAB
ANION GAP SERPL CALCULATED.3IONS-SCNC: 8 MMOL/L (ref 4–13)
BASOPHILS # BLD AUTO: 0.03 THOUSANDS/ΜL (ref 0–0.1)
BASOPHILS NFR BLD AUTO: 0 % (ref 0–1)
BUN SERPL-MCNC: 31 MG/DL (ref 5–25)
CALCIUM SERPL-MCNC: 8.1 MG/DL (ref 8.3–10.1)
CHLORIDE SERPL-SCNC: 100 MMOL/L (ref 100–108)
CO2 SERPL-SCNC: 24 MMOL/L (ref 21–32)
CREAT SERPL-MCNC: 1.25 MG/DL (ref 0.6–1.3)
EOSINOPHIL # BLD AUTO: 0.01 THOUSAND/ΜL (ref 0–0.61)
EOSINOPHIL NFR BLD AUTO: 0 % (ref 0–6)
ERYTHROCYTE [DISTWIDTH] IN BLOOD BY AUTOMATED COUNT: 15.6 % (ref 11.6–15.1)
GFR SERPL CREATININE-BSD FRML MDRD: 57 ML/MIN/1.73SQ M
GLUCOSE P FAST SERPL-MCNC: 116 MG/DL (ref 65–99)
GLUCOSE SERPL-MCNC: 116 MG/DL (ref 65–140)
HCT VFR BLD AUTO: 28.3 % (ref 36.5–49.3)
HCT VFR BLD AUTO: 29 % (ref 36.5–49.3)
HGB BLD-MCNC: 8.9 G/DL (ref 12–17)
HGB BLD-MCNC: 9.4 G/DL (ref 12–17)
IMM GRANULOCYTES # BLD AUTO: 0.04 THOUSAND/UL (ref 0–0.2)
IMM GRANULOCYTES NFR BLD AUTO: 0 % (ref 0–2)
LYMPHOCYTES # BLD AUTO: 1.28 THOUSANDS/ΜL (ref 0.6–4.47)
LYMPHOCYTES NFR BLD AUTO: 11 % (ref 14–44)
MCH RBC QN AUTO: 27.7 PG (ref 26.8–34.3)
MCHC RBC AUTO-ENTMCNC: 31.4 G/DL (ref 31.4–37.4)
MCV RBC AUTO: 88 FL (ref 82–98)
MONOCYTES # BLD AUTO: 1.34 THOUSAND/ΜL (ref 0.17–1.22)
MONOCYTES NFR BLD AUTO: 11 % (ref 4–12)
NEUTROPHILS # BLD AUTO: 9.25 THOUSANDS/ΜL (ref 1.85–7.62)
NEUTS SEG NFR BLD AUTO: 78 % (ref 43–75)
NRBC BLD AUTO-RTO: 0 /100 WBCS
PLATELET # BLD AUTO: 187 THOUSANDS/UL (ref 149–390)
PMV BLD AUTO: 8.8 FL (ref 8.9–12.7)
POTASSIUM SERPL-SCNC: 3.9 MMOL/L (ref 3.5–5.3)
RBC # BLD AUTO: 3.21 MILLION/UL (ref 3.88–5.62)
SODIUM SERPL-SCNC: 131 MMOL/L (ref 136–145)
SODIUM SERPL-SCNC: 132 MMOL/L (ref 136–145)
WBC # BLD AUTO: 11.95 THOUSAND/UL (ref 4.31–10.16)

## 2021-04-06 PROCEDURE — 97110 THERAPEUTIC EXERCISES: CPT

## 2021-04-06 PROCEDURE — 99024 POSTOP FOLLOW-UP VISIT: CPT | Performed by: ORTHOPAEDIC SURGERY

## 2021-04-06 PROCEDURE — NC001 PR NO CHARGE: Performed by: FAMILY MEDICINE

## 2021-04-06 PROCEDURE — 85025 COMPLETE CBC W/AUTO DIFF WBC: CPT | Performed by: PHYSICIAN ASSISTANT

## 2021-04-06 PROCEDURE — 97116 GAIT TRAINING THERAPY: CPT

## 2021-04-06 PROCEDURE — 97535 SELF CARE MNGMENT TRAINING: CPT

## 2021-04-06 PROCEDURE — 85014 HEMATOCRIT: CPT | Performed by: PHYSICIAN ASSISTANT

## 2021-04-06 PROCEDURE — 97167 OT EVAL HIGH COMPLEX 60 MIN: CPT

## 2021-04-06 PROCEDURE — 85018 HEMOGLOBIN: CPT | Performed by: PHYSICIAN ASSISTANT

## 2021-04-06 PROCEDURE — 84295 ASSAY OF SERUM SODIUM: CPT | Performed by: PHYSICIAN ASSISTANT

## 2021-04-06 PROCEDURE — 80048 BASIC METABOLIC PNL TOTAL CA: CPT | Performed by: PHYSICIAN ASSISTANT

## 2021-04-06 PROCEDURE — 99225 PR SBSQ OBSERVATION CARE/DAY 25 MINUTES: CPT | Performed by: FAMILY MEDICINE

## 2021-04-06 RX ORDER — OXYCODONE HYDROCHLORIDE 5 MG/1
TABLET ORAL
Qty: 60 TABLET | Refills: 0 | Status: SHIPPED | OUTPATIENT
Start: 2021-04-06 | End: 2021-06-16 | Stop reason: ALTCHOICE

## 2021-04-06 RX ORDER — ACETAMINOPHEN 325 MG/1
975 TABLET ORAL EVERY 8 HOURS
Refills: 0
Start: 2021-04-06 | End: 2021-06-16 | Stop reason: ALTCHOICE

## 2021-04-06 RX ORDER — ASPIRIN 325 MG
325 TABLET ORAL 2 TIMES DAILY
Qty: 84 TABLET | Refills: 0 | Status: SHIPPED | OUTPATIENT
Start: 2021-04-06 | End: 2021-06-16 | Stop reason: ALTCHOICE

## 2021-04-06 RX ADMIN — ACETAMINOPHEN 975 MG: 325 TABLET, FILM COATED ORAL at 05:46

## 2021-04-06 RX ADMIN — ACETAMINOPHEN 975 MG: 325 TABLET, FILM COATED ORAL at 14:31

## 2021-04-06 RX ADMIN — LABETALOL HYDROCHLORIDE 200 MG: 100 TABLET ORAL at 09:05

## 2021-04-06 RX ADMIN — SODIUM CHLORIDE 75 ML/HR: 0.9 INJECTION, SOLUTION INTRAVENOUS at 03:00

## 2021-04-06 RX ADMIN — CEFAZOLIN SODIUM 2000 MG: 2 SOLUTION INTRAVENOUS at 02:44

## 2021-04-06 RX ADMIN — ASPIRIN 325 MG: 325 TABLET, FILM COATED ORAL at 09:05

## 2021-04-06 RX ADMIN — ATORVASTATIN CALCIUM 10 MG: 10 TABLET, FILM COATED ORAL at 16:17

## 2021-04-06 RX ADMIN — GABAPENTIN 200 MG: 100 CAPSULE ORAL at 09:05

## 2021-04-06 RX ADMIN — DEXAMETHASONE SODIUM PHOSPHATE 10 MG: 4 INJECTION, SOLUTION INTRAMUSCULAR; INTRAVENOUS at 14:31

## 2021-04-06 RX ADMIN — DOCUSATE SODIUM 100 MG: 100 CAPSULE, LIQUID FILLED ORAL at 09:05

## 2021-04-06 RX ADMIN — DOCUSATE SODIUM 100 MG: 100 CAPSULE, LIQUID FILLED ORAL at 18:34

## 2021-04-06 RX ADMIN — AMLODIPINE BESYLATE 5 MG: 5 TABLET ORAL at 09:05

## 2021-04-06 RX ADMIN — PANTOPRAZOLE SODIUM 40 MG: 40 TABLET, DELAYED RELEASE ORAL at 09:05

## 2021-04-06 RX ADMIN — GABAPENTIN 200 MG: 100 CAPSULE ORAL at 18:34

## 2021-04-06 NOTE — OCCUPATIONAL THERAPY NOTE
Occupational Therapy Evaluation       04/06/21 1020   Note Type   Note type Evaluation   Restrictions/Precautions   Weight Bearing Precautions Per Order Yes   RLE Weight Bearing Per Order WBAT   Other Precautions Impulsive; Fall Risk  (Primary language Panjabi)   Pain Assessment   Pain Assessment Tool Pain Assessment not indicated - pt denies pain   Pain Score No Pain   Home Living   Type of [address] [facility] Multi-level;Stairs to enter with rails  (FOS down to bedroom)   Bathroom Shower/Tub Walk-in shower   [address] Other (Comment)  (None)   Additional Comments Patient is POD #1 s/p elective R TKA   Prior Function   Level of [place] Independent with ADLs and functional mobility   Lives With Spouse; Family  (Daughter Laraine Siemens RN))   [employer] Help From Family   ADL Assistance Independent   IADLs Independent   Comments PTA patient was independent in ADLs and mobility without AD   Lifestyle   Intrinsic Gratification Patient enjoys doing yoga   ADL   Eating Assistance 7  Independent   Grooming Assistance 5  Supervision/Setup   UB Bathing Assistance 5  Supervision/Setup   LB Bathing Assistance 4  Minimal Assistance   UB Dressing Assistance 5  Supervision/Setup   LB Dressing Assistance 4  [address]  5  Supervision/Setup   Bed Mobility   Supine to Sit 7  Independent   Sit to Supine 7  Independent   Transfers   Sit to Stand 5  Supervision   Stand to Sit 5  Supervision   Functional Mobility   Functional Mobility 4  Minimal assistance   Additional Comments Patient ambulated few feet in room with RW   Balance   Static Sitting Good   Dynamic Sitting Fair +   Static Standing Fair +   Dynamic Standing Fair   Activity Tolerance   Activity Tolerance Patient tolerated treatment well   RUE Assessment   RUE Assessment WFL   LUE Assessment   LUE Assessment WFL   Cognition   Overall Cognitive Status WFL   Arousal/Participation Alert; Cooperative   Attention Within functional limits   Orientation Level Oriented X4   Following Commands Follows all commands and directions without difficulty   Comments Increased time to follow commands due to language barrier   Assessment   Limitation Decreased ADL status; Decreased UE strength;Decreased Safe judgement during ADL;Decreased endurance;Decreased self-care trans;Decreased high-level ADLs   Prognosis Good   Assessment Patient evaluated by Occupational Therapy  Patient admitted with Status post total knee replacement using cement, right  The patients occupational profile, medical and therapy history includes a extensive additional review of physical, cognitive, or psychosocial history related to current functional performance  Comorbidities affecting functional mobility and ADLS include: arthritis, HTN, anxiety, HLD, stroke  Prior to admission, patient was independent with functional mobility without assistive device, independent with ADLS and independent with IADLS  The evaluation identifies the following performance deficits: weakness, impaired balance, decreased endurance, increased fall risk, new onset of impairment of functional mobility, decreased ADLS, decreased IADLS, decreased activity tolerance, decreased safety awareness and decreased strength, that result in activity limitations and/or participation restrictions  This evaluation requires clinical decision making of high complexity, because the patient presents with comorbidites that affect occupational performance and required significant modification of tasks or assistance with consideration of multiple treatment options  The Barthel Index was used as a functional outcome tool presenting with a score of 55, indicating marked limitations of functional mobility and ADLS  The patient's raw score on the -PAC Daily Activity inpatient short form is 21, standardized score is 44 27, greater than 39 4  Patients at this level are likely to benefit from DC to home   Please refer to the recommendation of the Occupational Therapist for safe DC planning  Patient will benefit from skilled Occupational Therapy services to address above deficits and facilitate a safe return to prior level of function  Goals   Patient Goals To go home   STG Time Frame 1-3   Short Term Goal  Patient will increase standing tolerance to 10 minutes during ADL task to decrease assistance level and decrease fall risk; Patient will increase functional mobility to and from bathroom with rolling walker with supervision to increase performance with ADLS and to use a toilet; Patient will tolerate 10 minutes of UE ROM/strengthening to increase general activity tolerance and performance in ADLS/IADLS; Patient will improve functional activity tolerance to 10 minutes of sustained functional tasks to increase participation in basic self-care and decrease assistance level;  Patient will be able to to verbalize understanding and perform energy conservation/proper body mechanics during ADLS and functional mobility at least 75% of the time with minimal cueing to decrease signs of fatigue and increase stamina to return to prior level of function; Patient will increase dynamic sitting balance to good to improve the ability to sit at edge of bed or on a chair for ADLS;  Patient will increase dynamic standing balance to fair+ to improve postural stability and decrease fall risk during standing ADLS and transfers     LTG Time Frame 3-7   Long Term Goal Patient will increase standing tolerance to 15 minutes during ADL task to decrease assistance level and decrease fall risk; Patient will increase functional mobility to and from bathroom with rolling walker independently to increase performance with ADLS and to use a toilet; Patient will tolerate 15 minutes of UE ROM/strengthening to increase general activity tolerance and performance in ADLS/IADLS; Patient will improve functional activity tolerance to 15 minutes of sustained functional tasks to increase participation in basic self-care and decrease assistance level;  Patient will be able to to verbalize understanding and perform energy conservation/proper body mechanics during ADLS and functional mobility at least 90% of the time with nocueing to decrease signs of fatigue and increase stamina to return to prior level of function; Patient will increase static/dynamic standing balance to good to improve postural stability and decrease fall risk during standing ADLS and transfers  Functional Transfer Goals   Pt Will Perform All Functional Transfers   (STG supervision, LTG independent)   ADL Goals   Pt Will Perform Grooming   (LTG independent)   Pt Will Perform Bathing   (STG supervision, LTG independent)   Pt Will Perform UE Dressing   (LTG independent)   Pt Will Perform LE Dressing   (STG supervision, LTG independent)   Pt Will Perform Toileting   (LTG independent)   Plan   Treatment Interventions ADL retraining;Functional transfer training;UE strengthening/ROM; Endurance training;Patient/family training;Equipment evaluation/education; Compensatory technique education;Continued evaluation; Energy conservation; Activityengagement   Goal Expiration Date 04/20/21   OT Frequency 5x/wk   Additional Treatment Session   Start Time 1010   End Time 1020   Treatment Assessment Patient performed dressing task while seated EOB: min assist to don pants, supervision to don/doff bilateral socks, supervision to don overhead shirt  Patient ambulated short household distance to bathroom with RW and supervision, min VCs for safety awareness  Ambulatory transfer to/from standard height toilet with RW and close supervision, min verbal cues for safe transfer technique  Once back at bed, stand to sit with supervision, sit to supine independently  At end of session patient supine in bed with all needs met      Recommendation   OT Discharge Recommendation Return to previous environment with social support   AM-PAC Daily Activity Inpatient   Lower Body Dressing 3   Bathing 3   Toileting 3   Upper Body Dressing 4   Grooming 4   Eating 4   Daily Activity Raw Score 21   Daily Activity Standardized Score (Calc for Raw Score >=11) 44 27   AM-PAC Applied Cognition Inpatient   Following a Speech/Presentation 4   Understanding Ordinary Conversation 4   Taking Medications 4   Remembering Where Things Are Placed or Put Away 4   Remembering List of 4-5 Errands 4   Taking Care of Complicated Tasks 4   Applied Cognition Raw Score 24   Applied Cognition Standardized Score 62 21   Barthel Index   Feeding 10   Bathing 0   Grooming Score 0   Dressing Score 5   Bladder Score 10   Bowels Score 10   Toilet Use Score 5   Transfers (Bed/Chair) Score 10   Mobility (Level Surface) Score 0   Stairs Score 5   Barthel Index Score [facility]   Licensure   [place] License Number  [place], OTR/L [id number]

## 2021-04-06 NOTE — PLAN OF CARE
Problem: PHYSICAL THERAPY ADULT  Goal: Performs mobility at highest level of function for planned discharge setting  See evaluation for individualized goals  Description: Treatment/Interventions: LE strengthening/ROM, Functional transfer training, Elevations, Therapeutic exercise, Gait training, Bed mobility, Equipment eval/education, Patient/family training, Spoke to nursing  Equipment Recommended: (rolling walker and cane issued)       See flowsheet documentation for full assessment, interventions and recommendations  Outcome: Progressing  Note: Prognosis: Good  Problem List: Decreased strength, Decreased endurance, Impaired balance, Decreased mobility, Pain  Assessment: Pt demonstrates good progress POD 1 s/p R TKA  Pt is able to negotiate level surfaces and stairs with supervision  Pt demonstrates excellent knee ROM and strength but is impulsive at times, possibly due to language barrier  PT Discharge Recommendation: Other (Comment)(OP PT)          See flowsheet documentation for full assessment

## 2021-04-06 NOTE — PHYSICAL THERAPY NOTE
PT TREATMENT     04/06/21 0955   Note Type   Note Type BID visit/treatment   Pain Assessment   Pain Assessment Tool Pain Assessment not indicated - pt denies pain   Restrictions/Precautions   RLE Weight Bearing Per Order WBAT   Other Precautions   (speaks panjabi)   General   Chart Reviewed Yes   Family/Caregiver Present Yes  (daughter Madan Silverman present to translate)   Cognition   Overall Cognitive Status WFL   Subjective   Subjective "105% better"   Bed Mobility   Supine to Sit 7  Independent   Sit to Supine 7  Independent   Transfers   Sit to Stand 5  Supervision   Stand to Sit 5  Supervision   Stand pivot 5  Supervision   Additional items   (with walker)   Ambulation/Elevation   Gait pattern   (mildly unsteady)   Gait Assistance 4  Minimal assist   Assistive Device Rolling walker   Distance 2x125 feet   Stair Management Assistance 5  Supervision   Additional items Tactile cues   Stair Management Technique Two rails   Number of Stairs 4   Balance   Static Sitting Good   Dynamic Sitting Fair +   Static Standing Fair +   Dynamic Standing Fair +   Ambulatory Fair   Activity Tolerance   Activity Tolerance Patient tolerated treatment well   Exercises   Neuro re-ed x 10 each ankle pumps, quad set, SAQ, SLR, heel slides, LAQ, seated knee flexion; Ice applied to knee after therapy  ROM R knee 0-110   Assessment   Prognosis Good   Problem List Impaired balance;Decreased mobility; Decreased strength   Assessment Pt demonstrates excellent progress POD 1 s/p R TKA  Pt demonstrates good ROM, strength, and function and is ready to go home from the PT point of view  The patient's AM-PAC Basic Mobility Inpatient Short Form Raw Score is 22, Standardized Score is 47 4  A standardized score greater than 42 9 suggests the patient may benefit from discharge to home  Plan   Treatment/Interventions ADL retraining;Functional transfer training;LE strengthening/ROM; Elevations; Therapeutic exercise; Endurance training;Gait training; Bed mobility; Equipment eval/education;Patient/family training   Progress Progressing toward goals   PT Frequency Twice a day   Recommendation   PT Discharge Recommendation Other (Comment)  (OP PT)   Equipment Recommended Walker;Cane  (PT will issue cane)   Walker Package Recommended Wheeled walker   Change/add to [employer]?  No   AM-PAC Basic Mobility Inpatient   Turning in Bed Without Bedrails 4   Lying on Back to Sitting on Edge of Flat Bed 4   Moving Bed to Chair 4   Standing Up From Chair 4   Walk in Room 3   Climb 3-5 Stairs 3   Basic Mobility Inpatient Raw Score 22   Basic Mobility Standardized Score 21 8   Licensure   [place] License Number  Dinesh HURLEY [id number]

## 2021-04-06 NOTE — PLAN OF CARE
Problem: GENITOURINARY - ADULT  Goal: Maintains or returns to baseline urinary function  Description: INTERVENTIONS:  - Assess urinary function  - Encourage oral fluids to ensure adequate hydration if ordered  - Administer IV fluids as ordered to ensure adequate hydration  4/6/2021 0327 by Kenia Mera RN  Outcome: Progressing  4/6/2021 0324 by Kenia Mera RN  Note: Kern catheter in place and patent  IV fluids infusing as ordered  Goal: Urinary catheter remains patent  Description: INTERVENTIONS:  - Assess patency of urinary catheter  - If patient has a chronic kern, consider changing catheter if non-functioning  - Follow guidelines for intermittent irrigation of non-functioning urinary catheter  4/6/2021 0327 by Kenia Mera RN  Outcome: Progressing  4/6/2021 0324 by Kenia Mera RN  Note: Urinary catheter in place and draining     Problem: SKIN/TISSUE INTEGRITY - ADULT  Goal: Incision(s), wounds(s) or drain site(s) healing without S/S of infection  Description: INTERVENTIONS  - Assess and document risk factors for skin impairment   - Assess and document dressing, incision, and surrounding tissue  - Consider nutrition services referral as needed  4/6/2021 0327 by Kenia Mera RN  Outcome: Progressing  4/6/2021 0324 by Kenia Mera RN  Note: No skin issues   No drainage noted from silver dressing     Problem: HEMATOLOGIC - ADULT  Goal: Maintains hematologic stability  Description: INTERVENTIONS  - Assess for signs and symptoms of bleeding   4/6/2021 0327 by Kenia Mera RN  Outcome: Progressing  4/6/2021 0324 by Kenia Mera RN  Note: No bleeding from incisional site     Problem: MUSCULOSKELETAL - ADULT  Goal: Maintain or return mobility to safest level of function  Description: INTERVENTIONS:  - Assess patient's ability to carry out ADLs; assess patient's baseline for ADL function and identify physical deficits which impact ability to perform ADLs (bathing, toileting, dressing, etc )  - Assess/evaluate cause of self-care deficits   - Assess range of motion  - Assess patient's mobility  - Assess patient's need for assistive devices and provide as appropriate  - Encourage maximum independence but intervene and supervise when necessary  - Consider OT consult to assist with ADL evaluation and planning for discharge  - Provide patient education as appropriate  4/6/2021 0327 by Blayne Riley RN  Outcome: Progressing  4/6/2021 0324 by Blayne Riley RN  Note: Range of motion and mobility assessed  Goal: Maintain proper alignment of affected body part  Description: INTERVENTIONS:  - Support, maintain and protect limb and body alignment  - Provide patient/ family with appropriate education  4/6/2021 0327 by Blayne Riley RN  Outcome: Progressing  4/6/2021 0324 by Blayne Riley RN  Note: Educated patient on proper body alignment

## 2021-04-06 NOTE — PHYSICAL THERAPY NOTE
PT TREATMENT     04/06/21 1330   Note Type   Note Type BID visit/treatment   Pain Assessment   Pain Assessment Tool Pain Assessment not indicated - pt denies pain   Restrictions/Precautions   RLE Weight Bearing Per Order WBAT   Other Precautions   [facility]   General   Chart Reviewed Yes   Cognition   Overall Cognitive Status WFL   Arousal/Participation Cooperative   Subjective   Subjective "good"   Bed Mobility   Supine to Sit 7  Independent   Sit to Supine 7  Independent   Transfers   Sit to Stand 5  Supervision   Stand to Sit 5  Supervision   Stand pivot 4 supervision/ Minimal assistance   Additional items   (with walker)   Ambulation/Elevation   Gait pattern   (mildly unsteady)   Gait Assistance 4  Supervision/Minimal assist   Assistive Device Rolling walker   Distance 2x125 feet   Stair Management Assistance 5  Supervision/verbal cues   Stair Management Technique   (2 rails, 1 rail and 1 cane)   Number of Stairs 8 total   Activity Tolerance   Activity Tolerance Patient tolerated treatment well   Exercises   Neuro re-ed x 10 each ankle pumps, quad set, SAQ, LAQ, heel slides sitting and supine, SLR all AROM   Assessment   Prognosis Good   Problem List Decreased strength;Decreased endurance; Impaired balance;Decreased mobility;Pain   Assessment Pt demonstrates good progress POD 1 s/p R TKA  Pt is able to negotiate level surfaces and stairs with supervision  Pt demonstrates excellent knee ROM and strength but is impulsive at times, possibly due to language barrier  The patient's AM-PAC Basic Mobility Inpatient Short Form Raw Score is 20, Standardized Score is 43 99  A standardized score greater than 42 9 suggests the patient may benefit from discharge to home  Plan   Treatment/Interventions LE strengthening/ROM; Functional transfer training;Elevations; Therapeutic exercise;Gait training;Bed mobility; Equipment eval/education;Patient/family training;Spoke to nursing   Progress Progressing toward goals   PT Frequency Twice a day   Recommendation   PT Discharge Recommendation Other (Comment)  (OP PT)   Equipment Recommended   (rolling walker and cane issued)   Dam[place] 8 in Bed Without Bedrails 4   Lying on Back to Sitting on Edge of Flat Bed 4   Moving Bed to Chair 3   Standing Up From Chair 3   Walk in Room 3   Climb 3-5 Stairs 3   Basic Mobility Inpatient Raw Score 20   Basic Mobility Standardized Score 11 81   Licensure   [place] License Number  Luis Alfredo No PT  [id number]

## 2021-04-06 NOTE — TELEPHONE ENCOUNTER
woo pharmacist who stated he was receiving a different error message in his computer asking for PA not to verify dosage and duration  He will contact insurance to verify and contact our office if any additonal information is needed

## 2021-04-06 NOTE — TELEPHONE ENCOUNTER
Spoke with insurance company ([phone]) no authorization is needed pharmacist needs verify dosage and duration and then place the correct override code into the system

## 2021-04-06 NOTE — PLAN OF CARE
Problem: GENITOURINARY - ADULT  Goal: Maintains or returns to baseline urinary function  Description: INTERVENTIONS:  - Assess urinary function  - Encourage oral fluids to ensure adequate hydration if ordered  - Administer IV fluids as ordered to ensure adequate hydration  Outcome: Adequate for Discharge  Goal: Urinary catheter remains patent  Description: INTERVENTIONS:  - Assess patency of urinary catheter  - If patient has a chronic kern, consider changing catheter if non-functioning  - Follow guidelines for intermittent irrigation of non-functioning urinary catheter  Outcome: Adequate for Discharge     Problem: SKIN/TISSUE INTEGRITY - ADULT  Goal: Incision(s), wounds(s) or drain site(s) healing without S/S of infection  Description: INTERVENTIONS  - Assess and document risk factors for skin impairment   - Assess and document dressing, incision, and surrounding tissue  - Consider nutrition services referral as needed  Outcome: Adequate for Discharge     Problem: HEMATOLOGIC - ADULT  Goal: Maintains hematologic stability  Description: INTERVENTIONS  - Assess for signs and symptoms of bleeding   Outcome: Adequate for Discharge     Problem: MUSCULOSKELETAL - ADULT  Goal: Maintain or return mobility to safest level of function  Description: INTERVENTIONS:  - Assess patient's ability to carry out ADLs; assess patient's baseline for ADL function and identify physical deficits which impact ability to perform ADLs (bathing, toileting, dressing, etc )  - Assess/evaluate cause of self-care deficits   - Assess range of motion  - Assess patient's mobility  - Assess patient's need for assistive devices and provide as appropriate  - Encourage maximum independence but intervene and supervise when necessary  - Consider OT consult to assist with ADL evaluation and planning for discharge  - Provide patient education as appropriate  Outcome: Adequate for Discharge  Goal: Maintain proper alignment of affected body part  Description: INTERVENTIONS:  - Support, maintain and protect limb and body alignment  - Provide patient/ family with appropriate education  Outcome: Adequate for Discharge     Problem: Potential for Falls  Goal: Patient will remain free of falls  Description: INTERVENTIONS:  - Assess patient frequently for physical needs  -  Identify cognitive and physical deficits and behaviors that affect risk of falls    -  [place] fall precautions as indicated by assessment   - Educate patient/family on patient safety including physical limitations  - Instruct patient to call for assistance with activity based on assessment  - Modify environment to reduce risk of injury  - Consider OT/PT consult to assist with strengthening/mobility  Outcome: Adequate for Discharge

## 2021-04-06 NOTE — PROGRESS NOTES
Progress Note - Orthopedics   Emeka Barr 65 y o  male MRN: [id number]  Unit/Bed#: 2 [place] [address] A Encounter: [id number]    Assessment:  1) POD#1 s/p R TKA    Plan:  Ancef 2 g IV x 2 for 24 hours postop- completed  DVT prophylaxis: ASA 325mg PO BID/SCD's/Ambulation  WBAT RLE  PT/OT- WBAT RLE  Analgesia PRN    Follow up AM labs:  Sodium at patient's baseline, hemoglobin with significant drop on testing this morning- question lab error and redraw ordered for eval   There is no sign of bleeding in the patient's knee  Patient is asymptomatic off progressing with physical therapy    Consult to [facility] - post op medical management- consult appreciated  Dressing- monitor for drainage, may stay in place x7 days    Discharge planning - patient has progressed well with physical therapy postoperatively  The patient's daughter was at bedside today and discharge instructions were discussed  We will redraw his hemoglobin around 12 30 today as I do feel this morning's result was in error  There is no gross sign of bleeding clinically or at the surgical site  Patient is asymptomatic while working with physical therapy  Patient has progressed well with physical therapy and his pain is controlled well  He has been deemed stable for discharge home from physical therapy standpoint we will plan for that later today so that he can start outpatient physical therapy later this week  Discharge pending results of H&H withdrawal     Weight bearing: WBAT RLE    VTE Pharmacologic Prophylaxis: ASA 325mg PO BID  VTE Mechanical Prophylaxis: sequential compression device    Subjective:  Patient seen examined at bedside  Patient working with physical therapy and doing well  Pain appears to be controlled  Patient's daughter at bedside and plan of care was discussed  Vitals: Blood pressure 122/68, pulse 61, temperature 99 5 °F (37 5 °C), resp  rate 16, height 6' 2" (1 88 m), weight 77 7 kg (171 lb 3 2 oz), SpO2 98 %  ,Body mass index is 21 98 kg/m²  Intake/Output Summary (Last 24 hours) at 4/6/2021 1010  Last data filed at 4/6/2021 0601  Gross per 24 hour   Intake 2425 ml   Output 1920 ml   Net 505 ml       Invasive Devices     Peripheral Intravenous Line            Peripheral IV 04/05/21 Right Wrist 1 day                Physical Exam: NAD  Ortho Exam: RLE: Dsg c/d/i, compartments soft, calf non-tender, decreased PF/DF/EHL, decreased DP/SP/Saph/Sural SILT, DP 2+, foot warm      Nick Landing D O    Division of Adult Reconstruction  Department of Orthopaedic Surgery  [facility]

## 2021-04-06 NOTE — PROGRESS NOTES
[facility] Consult Note - Marylene [place] 65 y o  male MRN: [id number]    Unit/Bed#: 2 [address] A Encounter: [id number]      Assessment/Plan:  * Status post total knee replacement using cement, right  Assessment & Plan  - received Ancef 2 g IV x 2 for 24 hours post-op  - orthopedic surgery for post-op management  - monitor for any fever   - PT/OT  - DVT ppx ordered  - analgesia per ortho surgery orders  - dressing change as directed per orders  - DCP for tomorrow with PT eval    Hyperlipidemia  Assessment & Plan  Continue lipitor 10 mg q h s  Anxiety  Assessment & Plan  Continue lexapro     Essential hypertension  Assessment & Plan  Stable  Continue home medications and monitor VS  - c/w amlodipine 5 mg bid and labetalol 200 mg b i d    - will hold Cozaar due to the risk of hypotension  Currently normotensive    History of hemorrhagic cerebrovascular accident (CVA) without residual deficits  Assessment & Plan  Subarachnoid hemorrhage in 2003  stable        Subjective:   Patient was seen and examined at bedside  Patient denies any pain at this time  Erik any abdominal pain for bowel movement yet  Possible DC today  Objective:     Vitals: Blood pressure 121/69, pulse 67, temperature 98 6 °F (37 °C), temperature source Temporal, resp  rate 16, height 6' 2" (1 88 m), weight 77 7 kg (171 lb 3 2 oz), SpO2 97 %  ,Body mass index is 21 98 kg/m²  Wt Readings from Last 3 Encounters:   04/05/21 77 7 kg (171 lb 3 2 oz)   03/24/21 80 3 kg (177 lb)   03/23/21 78 5 kg (173 lb)       Intake/Output Summary (Last 24 hours) at 4/6/2021 0653  Last data filed at 4/6/2021 0601  Gross per 24 hour   Intake 1950 ml   Output 1370 ml   Net 580 ml       Physical Exam:   General: Pt is AAO x 3, not in any acute distress  Cardio: RRR, S1 and S2 +, no murmurs/rubs/gallops/clicks  Resp: CTA b/l, no wheezes/rales/rhonchi  Abdomen: soft, NT/ND, BS+  Extremities: no cyanosis or edema   Dressed surgical wound on the right knee        Recent Results (from the past 24 hour(s))   Basic metabolic panel    Collection Time: 04/06/21  5:48 AM   Result Value Ref Range    Sodium 132 (L) 136 - 145 mmol/L    Potassium 3 9 3 5 - 5 3 mmol/L    Chloride 100 100 - 108 mmol/L    CO2 24 21 - 32 mmol/L    ANION GAP 8 4 - 13 mmol/L    BUN 31 (H) 5 - 25 mg/dL    Creatinine 1 25 0 60 - 1 30 mg/dL    Glucose 116 65 - 140 mg/dL    Glucose, Fasting 116 (H) 65 - 99 mg/dL    Calcium 8 1 (L) 8 3 - 10 1 mg/dL    eGFR 57 ml/min/1 73sq m   CBC and differential    Collection Time: 04/06/21  5:48 AM   Result Value Ref Range    WBC 11 95 (H) 4 31 - 10 16 Thousand/uL    RBC 3 21 (L) 3 88 - 5 62 Million/uL    Hemoglobin 8 9 (L) 12 0 - 17 0 g/dL    Hematocrit 28 3 (L) 36 5 - 49 3 %    MCV 88 82 - 98 fL    MCH 27 7 26 8 - 34 3 pg    MCHC 31 4 31 4 - 37 4 g/dL    RDW 15 6 (H) 11 6 - 15 1 %    MPV 8 8 (L) 8 9 - 12 7 fL    Platelets 806 074 - 336 Thousands/uL    nRBC 0 /100 WBCs    Neutrophils Relative 78 (H) 43 - 75 %    Immat GRANS % 0 0 - 2 %    Lymphocytes Relative 11 (L) 14 - 44 %    Monocytes Relative 11 4 - 12 %    Eosinophils Relative 0 0 - 6 %    Basophils Relative 0 0 - 1 %    Neutrophils Absolute 9 25 (H) 1 85 - 7 62 Thousands/µL    Immature Grans Absolute 0 04 0 00 - 0 20 Thousand/uL    Lymphocytes Absolute 1 28 0 60 - 4 47 Thousands/µL    Monocytes Absolute 1 34 (H) 0 17 - 1 22 Thousand/µL    Eosinophils Absolute 0 01 0 00 - 0 61 Thousand/µL    Basophils Absolute 0 03 0 00 - 0 10 Thousands/µL       Current Facility-Administered Medications   Medication Dose Route Frequency Provider Last Rate Last Admin    acetaminophen (TYLENOL) tablet 975 mg  975 mg Oral Q8H Leonidas Novak PA-C   975 mg at 04/06/21 0546    aluminum-magnesium hydroxide-simethicone (MYLANTA) oral suspension 30 mL  30 mL Oral Q6H PRN Tara Santos PA-C        amLODIPine (NORVASC) tablet 5 mg  5 mg Oral BID Tara Santos PA-C   5 mg at 04/05/21 2140    aspirin tablet 325 mg  [facility]   325 mg at 04/05/21 2140    atorvastatin (LIPITOR) tablet 10 mg  10 mg Oral Daily With KIRIT Gleason-C   10 mg at 04/05/21 1713    bupivacaine liposomal (EXPAREL) 1 3 % injection 20 mL  20 mL Infiltration Once [facility], PA-C        dexamethasone (DECADRON) injection 10 mg  10 mg Intravenous Once Leonidas Novak PA-C        docusate sodium (COLACE) capsule 100 mg  100 mg Oral BID [facility], PA-C   100 mg at 04/05/21 1713    escitalopram (LEXAPRO) tablet 5 mg  5 mg Oral HS [facility], PA-C   5 mg at 04/05/21 2140    gabapentin (NEURONTIN) capsule 200 mg  200 mg Oral BID [facility], PA-C   200 mg at 04/05/21 1713    HYDROmorphone (DILAUDID) injection 0 5 mg  0 5 mg Intravenous Q2H PRN [facility], PA-MARVA        labetalol (NORMODYNE) tablet 200 mg  200 mg Oral Q12H [facility], PA-C   200 mg at 04/05/21 2137    lactated ringers bolus 1,000 mL  1,000 mL Intravenous Once PRN [facility], PA-MARVA        And    lactated ringers bolus 1,000 mL  1,000 mL Intravenous Once PRN [facility], PA-C        magnesium hydroxide (MILK OF MAGNESIA) oral suspension 30 mL  30 mL Oral Daily PRN [facility], ALENA        melatonin tablet 3 mg  3 mg Oral HS [facility], PA-C   3 mg at 04/05/21 2141    ondansetron (ZOFRAN) injection 4 mg  4 mg Intravenous Q6H PRN [facility], PA-C        oxyCODONE (ROXICODONE) immediate release tablet 10 mg  10 mg Oral Q4H PRN [facility], PA-C        oxyCODONE (ROXICODONE) IR tablet 5 mg  5 mg Oral Q4H PRN [facility], PA-C        pantoprazole (PROTONIX) EC tablet 40 mg  40 mg Oral Daily [facility], PA-C   40 mg at 04/05/21 1520    scopolamine (TRANSDERM-SCOP) 1 5 mg/3 days TD 72 hr patch 1 patch  1 patch Transdermal Once Mary Cee PA-C   1 patch at 04/05/21 0732    sodium chloride 0 9 % bolus 1,000 mL  1,000 mL Intravenous Once PRN Mary Cee PA-C        And    sodium chloride 0 9 % bolus 1,000 mL  1,000 mL Intravenous Once PRN Radha Primitivo, PA-C        sodium chloride 0 9 % infusion  75 mL/hr Intravenous Continuous Radha Primitivo, PA-C 75 mL/hr at 04/06/21 0300 75 mL/hr at 04/06/21 0300       Invasive Devices     Peripheral Intravenous Line            Peripheral IV 04/05/21 Right Wrist less than 1 day                Lab, Imaging and other studies: I have personally reviewed pertinent reports      VTE Pharmacologic Prophylaxis: ASA  VTE Mechanical Prophylaxis: sequential compression device    Megan Adkins DO

## 2021-04-06 NOTE — CASE MANAGEMENT
Discharge ordered  Pt will be returning home with family and going to outpatient therapy  Rolling walker was delivered to pt's room by Venus Austin  Family will be transporting home

## 2021-04-06 NOTE — TELEPHONE ENCOUNTER
Patient sees Dr Shahnaz Clinton is calling in from [facility] relating this patients Oxycodone 5 mg tabs needs a prior authorization with this patients insurance        Call back if needed# [phone]

## 2021-04-06 NOTE — CASE MANAGEMENT
LOS - 1 day    MARIELA spoke with pt's daughter, Sina Still, to assess needs and discuss plans  Discussed goals of making sure pt's needs are met upon discharge and that Freedom of Choice is offered  Pt was admitted following elective orthopedic surgery  Pt is alert, oriented, speaks [place]  Pt lives with his wife, daughter and family  Per daughter pt was independent with ADLs and mobility prior to surgery  Pt has PCP and prescription coverage  Discussed discharge plans with daughter  Pt will be returning home with family and going for outpatient therapy  Rolling walker is being recommended for pt  Reviewed DME company options with daughter  Daughter chose to have DME ordered from [employer]  Rolling walker ordered from [employer] through [facility]  DME will be delivered to pt's room prior to discharge  Pt's daughter said family will be transporting pt home  No other discharge needs expressed or anticipated by daughter at this time  Offered support in future if needed

## 2021-04-07 ENCOUNTER — TELEPHONE (OUTPATIENT)
Dept: OBGYN CLINIC | Facility: HOSPITAL | Age: 71
End: 2021-04-07

## 2021-04-07 LAB
DME PARACHUTE DELIVERY DATE ACTUAL: NORMAL
DME PARACHUTE DELIVERY DATE REQUESTED: NORMAL
DME PARACHUTE ITEM DESCRIPTION: NORMAL
DME PARACHUTE ORDER STATUS: NORMAL
DME PARACHUTE SUPPLIER NAME: NORMAL
DME PARACHUTE SUPPLIER PHONE: NORMAL

## 2021-04-07 NOTE — TELEPHONE ENCOUNTER
Daughter, Elli Odell, contacted to complete a postoperative follow up call assessment  She reports pt is "doing good, and doesn't want any oxycodone " Pt did take oxycodone last night and "slept through the night " Daughter reports pt has been active in yoga for the last 20 years and has been doing well with the RW since home  Callowaylay Odell reports patients swelling is "better" and has been icing, dressing is dry and denies any drainage  Pt's AVS and AVS medication list were reviewed  Elli Odell reports pt is taking tylenol 975mg TID for pain  Also taking the ASA 325mg BID for blood clot prevention  Elli Odell states pt is taking colace BID, but has has had a BM yesterday  Elli Odell states patient has no complaints  Denies any chest pain, SOB, dizziness, fever or calf pain  Pt has outpatient PT tomorrow and sees surgeon on 4/21  I advised Elli Odell to contact us with any questions or issues

## 2021-04-07 NOTE — TELEPHONE ENCOUNTER
Pts daughter contacted, her VM is full and will not allow me to leave a VM at this time  Will try patient's daughter again shortly

## 2021-04-07 NOTE — NURSING NOTE
Patients IV was removed and belongings were gathered by daughter, Nurse Madan Silverman  Patient brought to exit of [facility] by daughter, JOI Silverman

## 2021-04-08 ENCOUNTER — OFFICE VISIT (OUTPATIENT)
Dept: PHYSICAL THERAPY | Facility: CLINIC | Age: 71
End: 2021-04-08
Payer: COMMERCIAL

## 2021-04-08 DIAGNOSIS — M25.562 BILATERAL CHRONIC KNEE PAIN: ICD-10-CM

## 2021-04-08 DIAGNOSIS — M17.11 PRIMARY OSTEOARTHRITIS OF RIGHT KNEE: Primary | ICD-10-CM

## 2021-04-08 DIAGNOSIS — M25.561 BILATERAL CHRONIC KNEE PAIN: ICD-10-CM

## 2021-04-08 DIAGNOSIS — G89.29 BILATERAL CHRONIC KNEE PAIN: ICD-10-CM

## 2021-04-08 PROCEDURE — 97112 NEUROMUSCULAR REEDUCATION: CPT

## 2021-04-08 PROCEDURE — 97110 THERAPEUTIC EXERCISES: CPT

## 2021-04-08 NOTE — PROGRESS NOTES
PT Re-Evaluation     Today's date: 2021  Patient name: Kenny Pena  :   MRN: [id number]  Referring provider: Anna Church  Dx:   Encounter Diagnosis     ICD-10-CM    1  Primary osteoarthritis of right knee  M17 11    2  Bilateral chronic knee pain  M25 561     M25 562     G89 29                   Assessment  Assessment details: Kenny Pena is a 65 y o  male who presents to Physical Therapy at [place] [address] 3 days s/p R TKA, which occurred on 21  Pt ambulates without an assistive device with shortened step length, decreased gait speed, and antalgic gait pattern  Patient presents with the following impairments: right knee pain, increased RLE edema, gait dysfunction, limited right knee strength, and limited activity tolerance  Due to these impairments, patient has difficulty performing the following: ADL's, recreational activities, engaging in social activities, ambulation, stair negotiation, lifting/carrying, transfers, prolonged standing, squatting, kneeling, household chores, yard work, and shopping  Reviewed HEP, DVT/infection prophylaxis, car transfers, stair negotiation, and home setup with pt and family  Patient would benefit from skilled physical therapy services to address the above functional limitations and progress towards prior level of function and independence with home exercise program   Impairments: abnormal gait, abnormal muscle firing, abnormal or restricted ROM, activity intolerance, impaired balance, impaired physical strength, lacks appropriate home exercise program, pain with function, weight-bearing intolerance, poor posture  and poor body mechanics  Understanding of Dx/Px/POC: good   Prognosis: good    Goals  Short Term Goals (3 weeks; target date: 5/15/21)  1  Patient will be independent with initial HEP  2  Patient will demonstrate an increase in right knee flexion AROM to 100°     3  Patient will demonstrate an increase in right knee strength of 1/2 grade on MMT     Long Term Goals (8 weeks; target date: 6/30/21)  1  Patient will be independent with comprehensive HEP  2  Patient will demonstrate an increase in right knee AROM to WNL in order to promote self-care activities pain-free  3  Patient will demonstrate an increase in right knee strength to 4/5 on MMT  4  Patient will be able to perform a full, functional squat with proper mechanics  5  Patient will be able to ascend/descend 10 stairs reciprocally without use of handrail  6  Patient will be able to ambulate 300 ft on even and uneven surfaces without AD  Plan  Plan details: Patient has been educated on the facility's COVID precautions and procedures  Patient has also been educated on the facility's cancellation policy and has verbalized agreement with this     Patient would benefit from: skilled physical therapy  Planned modality interventions: cryotherapy, electrical stimulation/Russian stimulation, TENS, ultrasound, high voltage pulsed current: pain management, thermotherapy: hydrocollator packs, unattended electrical stimulation and high voltage pulsed current: spasm management  Planned therapy interventions: flexibility, functional ROM exercises, graded exercise, home exercise program, joint mobilization, manual therapy, neuromuscular re-education, patient education, strengthening, stretching, therapeutic exercise, therapeutic activities, activity modification, postural training, body mechanics training, graded activity, self care, muscle pump exercises, abdominal trunk stabilization, ADL retraining, ADL training, IADL retraining, breathing training, behavior modification, therapeutic training, transfer training, Mohan taping, gait training and graded motor  Frequency: 3x week  Duration in weeks: 8  Plan of Care beginning date: 4/8/2021  Plan of Care expiration date: 7/8/2021  Treatment plan discussed with: patient and family        Subjective Evaluation    History of Present Illness  Date of surgery: 2021  Mechanism of injury: Pt's history obtained from pt and pt's daughter, who is present for the evaluation to interpret  Pt's daughter states pt has had chronic bilateral knee pain of several months and has been limited with walking and stair climbing  Pt's daughter states that pt is a candidate for knee replacement of both sides, but has opted to go with the right knee first  Pt's daughter states that x-rays have showed advanced arthritis in both knees  Pt's daughter states that pt has not received injection treatment in the right knee in the past  Pt is scheduled for total knee arthroplasty for the right knee on 21 and scheduled to return to PT 3 days after      (21) Pt reports that he has been doing his exercises 3-4x daily  Pt's daughter states that pt has very minimal pain  Pt's daughter states pt has been very active with exercise  She also states that pt is "stubborn" and does not want to use a cane or walker     Pain  Current pain ratin  At best pain ratin  At worst pain ratin  Location: Right knee  Quality: dull ache  Relieving factors: rest  Aggravating factors: standing, walking, lifting and stair climbing  Progression: no change    Social Support  Steps to enter house: yes  2  Stairs in house: yes   10  Lives in: multiple-level home  Lives with: adult children, young children and spouse    Employment status: not working  Hand dominance: right  Exercise history: Every day - yoga      Diagnostic Tests  X-ray: abnormal  Patient Goals  Patient goals for therapy: decreased pain  Patient goal: To be able to walk better        Objective     Active Range of Motion     Right Knee   Flexion: 88 degrees with pain  Extension: 0 degrees     Strength/Myotome Testing     Right Knee   Flexion: 2-  Extension: 3  Quadriceps contraction: good    Ambulation     Comments   Ambulates with shortened step length RLE, antalgic pattern without AD post-operatively           Precautions: Hx of CVA; R total knee 4/5/21       EVAL (pre-op) 2 (post-op) 3 4 5   Manuals 3/31/21 4/8/21      STM/MFR        Manual flexibility HS, gastrocs, quads  R HS, gastrocs      Joint mobs knee        PROM R knee  Flex/ext      Neuro Re-Ed        Quad sets Instructed 2x10 (5s)      Glute sets Instructed Reviewed      SLR  2x10       Clamshells        Bridging  10x      SLS                Ther Ex        Nustep  5' L1      Ankle pumps Instructed 30x      Heel slides Instructed 2x10 (5s)      Gastroc stretch  3x15s      HS stretch  3x15s                                              Ther Activity        Pt education POC, HEP Reviewed HEP, infection/DVT      Stairs        Squats        Gait   TUG                             Modalities

## 2021-04-12 ENCOUNTER — OFFICE VISIT (OUTPATIENT)
Dept: PHYSICAL THERAPY | Facility: CLINIC | Age: 71
End: 2021-04-12
Payer: COMMERCIAL

## 2021-04-12 DIAGNOSIS — M25.562 BILATERAL CHRONIC KNEE PAIN: ICD-10-CM

## 2021-04-12 DIAGNOSIS — M25.561 BILATERAL CHRONIC KNEE PAIN: ICD-10-CM

## 2021-04-12 DIAGNOSIS — M17.11 PRIMARY OSTEOARTHRITIS OF RIGHT KNEE: Primary | ICD-10-CM

## 2021-04-12 DIAGNOSIS — G89.29 BILATERAL CHRONIC KNEE PAIN: ICD-10-CM

## 2021-04-12 PROCEDURE — 97140 MANUAL THERAPY 1/> REGIONS: CPT

## 2021-04-12 PROCEDURE — 97110 THERAPEUTIC EXERCISES: CPT

## 2021-04-12 PROCEDURE — 97112 NEUROMUSCULAR REEDUCATION: CPT

## 2021-04-12 NOTE — PROGRESS NOTES
Daily Note      Today's date: 2021  Patient name: Jorge Joya  :   MRN: [id number]  Referring provider: Sandra Timmons  Dx:   Encounter Diagnosis     ICD-10-CM    1  Primary osteoarthritis of right knee  M17 11    2  Bilateral chronic knee pain  M25 561     M25 562     G89 29        Subjective: Pt reports no pain in the right knee  Pt's daughter reports that pt's knee was swollen yesterday and states pt has been applying ice regularly  Objective: See treatment diary below    Timed-Up-and-Go Score: 28 9 seconds without AD    Normative Values  Age Group Time (seconds)    60-70 8 1   70-80 9 2   80-90 11 3   Comments: short step length, difficulty with STS transfer    Assessment: Pt tolerated treatment well  Pt's bandage was removed during today's session and incision appears to be clean and healing well, with minimal scabbing, good skin approximation, and no signs of infection  Pt continues to progress well and is nearing full right knee flexion and extension AROM  Pt performed TUG without AD and was limited by STS transfer (hip depth lower than knees while seated - pt is 6'2") and has limited step length  Pt's daughter noted that prior to surgery, pt ambulated with even shorter step length  Pt provided with verbal cues for increased heel strike and increased step length and was able to ambulate with improved gait speed by the end of the session  Plan: Continue per plan of care  Progress treatment as tolerated            Precautions: Hx of CVA; R total knee 21       EVAL (pre-op) 2 (post-op) 3 4 5   Manuals 3/31/21 4/8/21 4/12/21     STM/MFR        Manual flexibility HS, gastrocs, quads  R HS, gastrocs R HS, gastrocs     Joint mobs knee        PROM R knee  Flex/ext Flex/ext     Neuro Re-Ed        Quad sets Instructed 2x10 (5s) 2x10 (5s)     Glute sets Instructed Reviewed      SLR  2x10  2x10     Clamshells        Bridging  10x 15x     SLS                Ther Ex        Nustep 5' L1 8' L2     Ankle pumps Instructed 30x 30x     Heel slides Instructed 2x10 (5s) 20x w/ peanut     Gastroc stretch  3x15s 3x15s      HS stretch  3x15s 3x15s      Heel raises        HS curl                                 Ther Activity        Pt education POC, HEP Reviewed HEP, infection/DVT Reviewed infection prevention; removed bandage     Stairs        Squats        Gait   TUG 28 9 sec without AD                             Modalities

## 2021-04-13 ENCOUNTER — OFFICE VISIT (OUTPATIENT)
Dept: PHYSICAL THERAPY | Facility: CLINIC | Age: 71
End: 2021-04-13
Payer: COMMERCIAL

## 2021-04-13 DIAGNOSIS — G89.29 BILATERAL CHRONIC KNEE PAIN: ICD-10-CM

## 2021-04-13 DIAGNOSIS — M17.11 PRIMARY OSTEOARTHRITIS OF RIGHT KNEE: Primary | ICD-10-CM

## 2021-04-13 DIAGNOSIS — M25.562 BILATERAL CHRONIC KNEE PAIN: ICD-10-CM

## 2021-04-13 DIAGNOSIS — M25.561 BILATERAL CHRONIC KNEE PAIN: ICD-10-CM

## 2021-04-13 PROCEDURE — 97140 MANUAL THERAPY 1/> REGIONS: CPT

## 2021-04-13 PROCEDURE — 97110 THERAPEUTIC EXERCISES: CPT

## 2021-04-13 PROCEDURE — 97112 NEUROMUSCULAR REEDUCATION: CPT

## 2021-04-13 NOTE — PROGRESS NOTES
Daily Note      Today's date: 2021  Patient name: Sharlett Bosworth  : [birth year]  MRN: [id number]  Referring provider: Ranjith Milligan  Dx:   Encounter Diagnosis     ICD-10-CM    1  Primary osteoarthritis of right knee  M17 11    2  Bilateral chronic knee pain  M25 561     M25 562     G89 29        Subjective: Pt reports no pain in the right knee  Pt states he has been doing his exercises  Pt states he looks forward to being able to "walk freely "       Objective: See treatment diary below; R knee flexion AAROM w/ SOS = 125°    Assessment: Pt tolerated treatment well  Pt demonstrates improving R knee flexion AAROM with strap and is approaching full ROM  Pt demonstrates the ability to actively fully extend right knee, however avoids terminal knee extension when ascending stairs  Pt required frequent verbal cuing throughout exercise program to ambulate with increased step length and hip/knee flexion  Pt likely ambulates with shortened step pattern due to motor patterns created during years of chronic knee pain  Plan: Continue per plan of care  Progress treatment as tolerated            Precautions: Hx of CVA; R total knee 21       EVAL (pre-op) 2 (post-op) 3 4 5   Manuals 3/31/21 4/8/21 4/12/21 4/13/21    STM/MFR        Manual flexibility HS, gastrocs, quads  R HS, gastrocs R HS, gastrocs R HS, gastrocs    Joint mobs knee        PROM R knee  Flex/ext Flex/ext Flex/ext    Neuro Re-Ed        Quad sets Instructed 2x10 (5s) 2x10 (5s) 2x10 (5s)    Glute sets Instructed Reviewed      SLR  2x10  2x10 2x10    Clamshells        Bridging  10x 15x 15x    SLS                Ther Ex        Nustep  5' L1 8' L2 8' L2    Ankle pumps Instructed 30x 30x 30x    Heel slides Instructed 2x10 (5s) 20x w/ peanut 20x w/ SOS    Gastroc stretch  3x15s 3x15s  3x15s    HS stretch  3x15s 3x15s  3x15s     Heel raises        HS curl         Step up    15x 6" step B/L rails    Lateral step up    15x 6" step B/L rails            Ther Activity        Pt education POC, HEP Reviewed HEP, infection/DVT Reviewed infection prevention; removed bandage     Stairs        Squats        Gait   TUG 28 9 sec without AD                             Modalities        CP R knee    5' post

## 2021-04-15 ENCOUNTER — OFFICE VISIT (OUTPATIENT)
Dept: PHYSICAL THERAPY | Facility: CLINIC | Age: 71
End: 2021-04-15
Payer: COMMERCIAL

## 2021-04-15 DIAGNOSIS — M25.561 BILATERAL CHRONIC KNEE PAIN: ICD-10-CM

## 2021-04-15 DIAGNOSIS — M17.11 PRIMARY OSTEOARTHRITIS OF RIGHT KNEE: Primary | ICD-10-CM

## 2021-04-15 DIAGNOSIS — G89.29 BILATERAL CHRONIC KNEE PAIN: ICD-10-CM

## 2021-04-15 DIAGNOSIS — M25.562 BILATERAL CHRONIC KNEE PAIN: ICD-10-CM

## 2021-04-15 PROCEDURE — 97110 THERAPEUTIC EXERCISES: CPT

## 2021-04-15 PROCEDURE — 97140 MANUAL THERAPY 1/> REGIONS: CPT

## 2021-04-15 PROCEDURE — 97112 NEUROMUSCULAR REEDUCATION: CPT

## 2021-04-15 NOTE — PROGRESS NOTES
Daily Note      Today's date: 4/15/2021  Patient name: Yaneli Day  : [birth year]  MRN: [id number]  Referring provider: Bee Lopez  Dx:   Encounter Diagnosis     ICD-10-CM    1  Primary osteoarthritis of right knee  M17 11    2  Bilateral chronic knee pain  M25 561     M25 562     G89 29        Subjective: Pt reports no pain in the right knee  Pt states he is feeling good  Objective: See treatment diary below    Assessment: Pt tolerated treatment well  Pt introduced to sit to stand on a high-low plinth with hip height slightly below knee height and pt was able to perform 10 repetitions without use of hands  Pt does demonstrate impulsiveness with exercises and requires verbal cuing in order to slow down tempo during step ups, lateral step ups, and SLR  Pt demonstrates improved carryover of increased step length, but does require occasional reminders to ambulate with greater hip/knee flexion  Plan: Continue per plan of care  Progress treatment as tolerated            Precautions: Hx of CVA; R total knee 21       EVAL (pre-op) 2 (post-op) 3 4 5   Manuals 3/31/21 4/8/21 4/12/21 4/13/21 4/15/21   STM/MFR        Manual flexibility HS, gastrocs, quads  R HS, gastrocs R HS, gastrocs R HS, gastrocs R HS, gastrocs   Joint mobs knee        PROM R knee  Flex/ext Flex/ext Flex/ext Flex/ext   Neuro Re-Ed        Quad sets Instructed 2x10 (5s) 2x10 (5s) 2x10 (5s) Reviewed   Glute sets Instructed Reviewed      SLR  2x10  2x10 2x10 2x10   Clamshells        Bridging  10x 15x 15x 15x   SLS                Ther Ex        Nustep  5' L1 8' L2 8' L2 10' L3   Ankle pumps Instructed 30x 30x 30x    Heel slides Instructed 2x10 (5s) 20x w/ peanut 20x w/ SOS 20x w/ peanut   Gastroc stretch  3x15s 3x15s  3x15s 3x15s   HS stretch  3x15s 3x15s  3x15s  3x15s   Heel raises        HS curl         Step up    15x 6" step B/L rails 15x 6" step B/L rails    Lateral step up    15x 6" step B/L rails 15x 6" step B/L rails Ther Activity        Pt education POC, HEP Reviewed HEP, infection/DVT Reviewed infection prevention; removed bandage  Reviewed gait pattern and progress   Stairs        Squats     Sit to stand 10x high-low plinth   Gait   TUG 28 9 sec without AD                             Modalities        CP R knee    5' post 5' post

## 2021-04-19 ENCOUNTER — OFFICE VISIT (OUTPATIENT)
Dept: PHYSICAL THERAPY | Facility: CLINIC | Age: 71
End: 2021-04-19
Payer: COMMERCIAL

## 2021-04-19 DIAGNOSIS — M25.561 BILATERAL CHRONIC KNEE PAIN: ICD-10-CM

## 2021-04-19 DIAGNOSIS — G89.29 BILATERAL CHRONIC KNEE PAIN: ICD-10-CM

## 2021-04-19 DIAGNOSIS — M17.11 PRIMARY OSTEOARTHRITIS OF RIGHT KNEE: Primary | ICD-10-CM

## 2021-04-19 DIAGNOSIS — M25.562 BILATERAL CHRONIC KNEE PAIN: ICD-10-CM

## 2021-04-19 PROCEDURE — 97140 MANUAL THERAPY 1/> REGIONS: CPT

## 2021-04-19 PROCEDURE — 97110 THERAPEUTIC EXERCISES: CPT

## 2021-04-19 PROCEDURE — 97112 NEUROMUSCULAR REEDUCATION: CPT

## 2021-04-19 NOTE — PROGRESS NOTES
Daily Note      Today's date: 2021  Patient name: Wade Willett  : [birth year]  MRN: [id number]  Referring provider: Valeria Lomas  Dx:   Encounter Diagnosis     ICD-10-CM    1  Primary osteoarthritis of right knee  M17 11    2  Bilateral chronic knee pain  M25 561     M25 562     G89 29        Subjective: Pt reports no right knee pain currently  Objective: See treatment diary below    Assessment: Pt tolerated treatment well  Pt introduced to forward and lateral step downs and noted difficulty and presented with limited eccentric quads/glute med control while lowering  This exercise was modified by decreasing step height and pt presented with improved control, but requires at least unilateral hand rail  Pt noted no pain with eccentric activities, but noted difficulty  Pt does require occasional cuing for step length (<25% during gait)  Pt would benefit from continued skilled PT in order to improve functional mobility and ADLs  Plan: Continue per plan of care  Progress treatment as tolerated            Precautions: Hx of CVA; R total knee 21       6 2 (post-op) 3 4 5   Manuals 4/19/21 4/8/21 4/12/21 4/13/21 4/15/21   STM/MFR        Manual flexibility HS, gastrocs, quads R HS R HS, gastrocs R HS, gastrocs R HS, gastrocs R HS, gastrocs   Joint mobs knee        PROM R knee Flex/ext Flex/ext Flex/ext Flex/ext Flex/ext   Neuro Re-Ed        Quad sets Reviewed 2x10 (5s) 2x10 (5s) 2x10 (5s) Reviewed   Glute sets  Reviewed      SLR 2x10 2x10  2x10 2x10 2x10   Clamshells 30x blue TB       Bridging 15x 10x 15x 15x 15x   SLS        Lateral step down 20x level 2 uni rail       Ther Ex        Nustep 10' L3 5' L1 8' L2 8' L2 10' L3   Ankle pumps  30x 30x 30x    Heel slides 20x w/ peanut 2x10 (5s) 20x w/ peanut 20x w/ SOS 20x w/ peanut   Gastroc stretch 3x15s  3x15s 3x15s  3x15s 3x15s   HS stretch 5x15s 3x15s 3x15s  3x15s  3x15s   Heel raises 20x       HS curl  20x        Step up 20x level 2 uni rail 15x 6" step B/L rails 15x 6" step B/L rails    Lateral step up    15x 6" step B/L rails 15x 6" step B/L rails    Forward step down 20x level 2 B/L rail       Ther Activity        Pt education Progress, step length Reviewed HEP, infection/DVT Reviewed infection prevention; removed bandage  Reviewed gait pattern and progress   Stairs        Squats     Sit to stand 10x high-low plinth   Gait   TUG 28 9 sec without AD                             Modalities        CP R knee 5' post    5' post 5' post

## 2021-04-20 ENCOUNTER — OFFICE VISIT (OUTPATIENT)
Dept: PHYSICAL THERAPY | Facility: CLINIC | Age: 71
End: 2021-04-20
Payer: COMMERCIAL

## 2021-04-20 DIAGNOSIS — M17.11 PRIMARY OSTEOARTHRITIS OF RIGHT KNEE: Primary | ICD-10-CM

## 2021-04-20 DIAGNOSIS — G89.29 BILATERAL CHRONIC KNEE PAIN: ICD-10-CM

## 2021-04-20 DIAGNOSIS — M25.561 BILATERAL CHRONIC KNEE PAIN: ICD-10-CM

## 2021-04-20 DIAGNOSIS — M25.562 BILATERAL CHRONIC KNEE PAIN: ICD-10-CM

## 2021-04-20 PROCEDURE — 97140 MANUAL THERAPY 1/> REGIONS: CPT

## 2021-04-20 PROCEDURE — 97112 NEUROMUSCULAR REEDUCATION: CPT

## 2021-04-20 PROCEDURE — 97110 THERAPEUTIC EXERCISES: CPT

## 2021-04-20 NOTE — PROGRESS NOTES
Daily Note      Today's date: 2021  Patient name: Shannan Muir  :   MRN: [id number]  Referring provider: Aditya Brenner  Dx:   Encounter Diagnosis     ICD-10-CM    1  Primary osteoarthritis of right knee  M17 11    2  Bilateral chronic knee pain  M25 561     M25 562     G89 29        Subjective: Pt reports no pain in the right knee  Objective: See treatment diary below    Assessment: Pt tolerated treatment well  Pt presents with improved performance with lateral and forward step downs, but does require B/L HR in order to offload full weight during eccentric phase  Pt noted no pain with this exercise today  Pt demonstrates overall improved carryover of increased step length, requiring cuing less than 25% of the time throughout today's session  Plan: Continue per plan of care  Progress treatment as tolerated            Precautions: Hx of CVA; R total knee 21       6 7  4 5   Manuals 4/19/21 4/20/21  4/13/21 4/15/21   STM/MFR        Manual flexibility HS, gastrocs, quads R HS R HS, gastrocs  R HS, gastrocs R HS, gastrocs   Joint mobs knee        PROM R knee Flex/ext Flex/ext  Flex/ext Flex/ext   Neuro Re-Ed        Quad sets Reviewed   2x10 (5s) Reviewed   SLR 2x10 2x10   2x10 2x10   Clamshells 30x blue TB 30x blue      Bridging 15x 10x  15x 15x   SLS        Lateral step down 20x level 2 uni rail 20x 6" B/L rail                      Ther Ex        Nustep 10' L3 10' L3  8' L2 10' L3   Ankle pumps    30x    Heel slides 20x w/ peanut SKTC 10x10s  20x w/ SOS 20x w/ peanut   Gastroc stretch 3x15s  3x15s  3x15s 3x15s   HS stretch 5x15s 3x15s  3x15s  3x15s   Heel raises 20x 20x       HS curl  20x  20x       Step up 20x level 2 uni rail 20x level 2 uni rail to no HR  15x 6" step B/L rails 15x 6" step B/L rails    Lateral step up    15x 6" step B/L rails 15x 6" step B/L rails    Forward step down 20x level 2 B/L rail 20x level 2 B/L rail      Ther Activity        Pt education Progress, step length Progress   Reviewed gait pattern and progress   Squats     Sit to stand 10x high-low plinth   Gait  Reviewed increased step length                              Modalities        CP R knee 5' post  5' post  5' post 5' post

## 2021-04-22 ENCOUNTER — OFFICE VISIT (OUTPATIENT)
Dept: OBGYN CLINIC | Facility: CLINIC | Age: 71
End: 2021-04-22

## 2021-04-22 ENCOUNTER — OFFICE VISIT (OUTPATIENT)
Dept: PHYSICAL THERAPY | Facility: CLINIC | Age: 71
End: 2021-04-22
Payer: COMMERCIAL

## 2021-04-22 ENCOUNTER — APPOINTMENT (OUTPATIENT)
Dept: RADIOLOGY | Facility: CLINIC | Age: 71
End: 2021-04-22
Payer: COMMERCIAL

## 2021-04-22 ENCOUNTER — TELEPHONE (OUTPATIENT)
Dept: OBGYN CLINIC | Facility: HOSPITAL | Age: 71
End: 2021-04-22

## 2021-04-22 VITALS
DIASTOLIC BLOOD PRESSURE: 70 MMHG | WEIGHT: 180.2 LBS | HEIGHT: 74 IN | SYSTOLIC BLOOD PRESSURE: 128 MMHG | BODY MASS INDEX: 23.13 KG/M2 | HEART RATE: 70 BPM

## 2021-04-22 DIAGNOSIS — M17.11 PRIMARY OSTEOARTHRITIS OF RIGHT KNEE: Primary | ICD-10-CM

## 2021-04-22 DIAGNOSIS — Z98.890 STATUS POST RIGHT INGUINAL HERNIA REPAIR: ICD-10-CM

## 2021-04-22 DIAGNOSIS — M25.561 BILATERAL CHRONIC KNEE PAIN: ICD-10-CM

## 2021-04-22 DIAGNOSIS — Z47.1 AFTERCARE FOLLOWING RIGHT KNEE JOINT REPLACEMENT SURGERY: ICD-10-CM

## 2021-04-22 DIAGNOSIS — M25.562 BILATERAL CHRONIC KNEE PAIN: ICD-10-CM

## 2021-04-22 DIAGNOSIS — Z96.651 AFTERCARE FOLLOWING RIGHT KNEE JOINT REPLACEMENT SURGERY: ICD-10-CM

## 2021-04-22 DIAGNOSIS — Z96.651 STATUS POST TOTAL RIGHT KNEE REPLACEMENT USING CEMENT: Primary | ICD-10-CM

## 2021-04-22 DIAGNOSIS — Z87.19 STATUS POST RIGHT INGUINAL HERNIA REPAIR: ICD-10-CM

## 2021-04-22 DIAGNOSIS — G89.29 BILATERAL CHRONIC KNEE PAIN: ICD-10-CM

## 2021-04-22 PROCEDURE — 99024 POSTOP FOLLOW-UP VISIT: CPT | Performed by: ORTHOPAEDIC SURGERY

## 2021-04-22 PROCEDURE — 73562 X-RAY EXAM OF KNEE 3: CPT

## 2021-04-22 PROCEDURE — 97110 THERAPEUTIC EXERCISES: CPT

## 2021-04-22 PROCEDURE — 97112 NEUROMUSCULAR REEDUCATION: CPT

## 2021-04-22 PROCEDURE — 3008F BODY MASS INDEX DOCD: CPT | Performed by: INTERNAL MEDICINE

## 2021-04-22 NOTE — PROGRESS NOTES
Daily Note      Today's date: 2021  Patient name: Kristin Mercedes  : [birth year]  MRN: [id number]  Referring provider: Andrew Pollard  Dx:   Encounter Diagnosis     ICD-10-CM    1  Primary osteoarthritis of right knee  M17 11    2  Bilateral chronic knee pain  M25 561     M25 562     G89 29        Subjective: Pt reports no right knee pain  Pt states that going up and down the stairs is improving  Objective: See treatment diary below    Knee AROM Right   Flexion 125°   Extension 0°     Knee MMT Right   Flexion 3+/5   Extension 3+/5     Gait: limited step length, limited knee flexion B/L, improves with verbal cuing     Assessment: Pt tolerated treatment well  Pt introduced to leg press double leg this visit and required verbal cuing in order to perform with slower west in order to maximize use of quads, HS, and glutes  Pt presents with normal AROM of R knee at today's visit  Pt would benefit from continued PT in order to progress strength and continue working toward functional goals, including squatting and ascending/descending stairs  Pt requires occasional verbal cuing for increased step length in today's visit  Plan: Continue per plan of care  Progress treatment as tolerated            Precautions: Hx of CVA; R total knee 21       6 7 8 4 5   Manuals 4/19/21 4/20/21 4/22/21 4/13/21 4/15/21   STM/MFR        Manual flexibility HS, gastrocs, quads R HS R HS, gastrocs  R HS, gastrocs R HS, gastrocs   Joint mobs knee        PROM R knee Flex/ext Flex/ext  Flex/ext Flex/ext   Neuro Re-Ed        Quad sets Reviewed   2x10 (5s) Reviewed   SLR 2x10 2x10  2x10 1# 2x10 2x10   Clamshells 30x blue TB 30x blue 30x blue     Bridging 15x 10x 20x  15x 15x   SLS        Lateral step down 20x level 2 uni rail 20x 6" B/L rail                      Ther Ex        Nustep 10' L3 10' L3 10' L3 8' L2 10' L3   Ankle pumps    30x    Heel slides 20x w/ peanut SKTC 10x10s SKTC 10x10s 20x w/ SOS 20x w/ peanut   Gastroc stretch 3x15s  3x15s 3x15s 3x15s 3x15s   HS stretch 5x15s 3x15s 5x15s 3x15s  3x15s   Heel raises 20x 20x  20x      HS curl  20x  20x  20x      Step up 20x level 2 uni rail 20x level 2 uni rail to no HR 20x 6" step B/L rails 15x 6" step B/L rails 15x 6" step B/L rails    Lateral step up    15x 6" step B/L rails 15x 6" step B/L rails    Forward step down 20x level 2 B/L rail 20x level 2 B/L rail      Leg press   3x10 65# double leg             Ther Activity        Pt education Progress, step length Progress Progress  Reviewed gait pattern and progress   Squats     Sit to stand 10x high-low plinth   Gait  Reviewed increased step length                              Modalities        CP R knee 5' post  5' post 5' post 5' post 5' post

## 2021-04-22 NOTE — PROGRESS NOTES
Assessment/Plan:  1  Status post total right knee replacement using cement  XR knee 3 vw right non injury   2  Aftercare following right knee joint replacement surgery        Patient is here for 2 week follow-up status post right total knee arthroplasty  The patient's daughter is available via telephone for the encounter  The patient's daughter states that her father is doing well without significant pain  He is not relying on pain medication at this time  She does report compliance with DVT prophylaxis  Clinically he appears to be healing well and doing well  He will continue with physical therapy  He will also continue DVT prophylaxis  He may start to shower  His x-rays were reviewed and are without signs of issues or failures  I will see him back in 4 weeks time for continued postoperative follow-up  No new x-rays are needed at that visit  Subjective:   Two weeks status post right total knee arthroplasty    Patient ID: Brad Mckeon is a 66 y o  male  HPI   patient is here 2 weeks status post right total knee arthroplasty  The patient's daughters available via telephone to provide history due to the language barrier  She reports that her father is doing well without significant complaints of pain  He has been compliant with physical therapy  He has been compliant with aspirin use for DVT prophylaxis  She reports that he is doing well and making progress daily  She states that he has not been using the narcotics for pain control and has really taken and postoperatively  Review of Systems   Constitutional: Positive for activity change  HENT: Negative  Eyes: Negative  Respiratory: Negative  Cardiovascular: Negative  Gastrointestinal: Negative  Endocrine: Negative  Musculoskeletal: Positive for arthralgias and gait problem  Skin: Negative  Psychiatric/Behavioral: Negative            Past Medical History:   Diagnosis Date    Anxiety     Arthritis     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 [facility] [address] [facility]     Hyperlipidemia     Hypertension     Stroke ([facility] [address] ) 2005       Past Surgical History:   Procedure Laterality Date    HERNIA REPAIR      CA TOTAL KNEE ARTHROPLASTY Right 4/5/2021    Procedure: ARTHROPLASTY KNEE TOTAL;  Surgeon: Jamil Wilson DO;  Location: [facility];  Service: Orthopedics       Family History   Problem Relation Age of Onset    Stroke Mother     Stroke Father        Social History     Occupational History    Not on file   Tobacco Use    Smoking status: Never Smoker    Smokeless tobacco: Never Used   Substance and Sexual Activity    Alcohol use: Never     Frequency: Never    Drug use: Never    Sexual activity: Not Currently         Current Outpatient Medications:     acetaminophen (TYLENOL) 325 mg tablet, Take 3 tablets (975 mg total) by mouth every 8 (eight) hours, Disp:  , Rfl: 0    amLODIPine (NORVASC) 5 mg tablet, Take 1 tablet (5 mg total) by mouth 2 (two) times a day, Disp: 180 tablet, Rfl: 3    aspirin 325 mg tablet, Take 1 tablet (325 mg total) by mouth 2 (two) times a day, Disp: 84 tablet, Rfl: 0    atorvastatin (LIPITOR) 10 mg tablet, Take 1 tablet (10 mg total) by mouth daily with dinner, Disp: 90 tablet, Rfl: 3    docusate sodium (COLACE) 100 mg capsule, Take 1 capsule (100 mg total) by mouth 2 (two) times a day, Disp: 10 capsule, Rfl: 0    escitalopram (LEXAPRO) 5 mg tablet, Take 1 tablet (5 mg total) by mouth daily (Patient taking differently: Take 5 mg by mouth daily at bedtime ), Disp: 30 tablet, Rfl: 0    labetalol (NORMODYNE) 200 mg tablet, Take 1 tablet (200 mg total) by mouth every 12 (twelve) hours, Disp: 60 tablet, Rfl: 0    losartan (COZAAR) 50 mg tablet, Take 1 tablet (50 mg total) by mouth 2 (two) times a day, Disp: 60 tablet, Rfl: 0    Melatonin 5 MG CAPS, Take 1 capsule (5 mg total) by mouth daily at bedtime, Disp: 30 capsule, Rfl: 0    oxyCODONE (ROXICODONE) 5 mg immediate release tablet, Take 1-2 tablets by mouth every 4-6 hours as needed for pain, Disp: 60 tablet, Rfl: 0    polyethylene glycol (MIRALAX) 17 g packet, Take 17 g by mouth daily as needed (constipation) for up to 14 days, Disp: 14 each, Rfl: 0    No Known Allergies    Objective:  Vitals:    04/22/21 1454   BP: 128/70   Pulse: 70       Body mass index is 23 14 kg/m²  Right Knee Exam     Tenderness   The patient is experiencing tenderness in the medial retinaculum  Range of Motion   Extension: 0   Flexion: 110     Tests   Varus: negative Valgus: negative  Drawer:  Anterior - negative        Other   Scars: present  Sensation: normal  Pulse: present  Swelling: mild  Effusion: no effusion present    Comments:    Anterior knee  Incision healing well  No sign of infection   range of motion without crepitance   knee is stable at 0° 30° 90°   compartments soft   neurovascular intact          Observations     Right Knee   Negative for effusion  Physical Exam  Vitals signs and nursing note reviewed  Constitutional:       Appearance: He is well-developed  HENT:      Head: Atraumatic  Eyes:      General: No scleral icterus  Extraocular Movements: Extraocular movements intact  Conjunctiva/sclera: Conjunctivae normal    Neck:      Musculoskeletal: Neck supple  Cardiovascular:      Rate and Rhythm: Normal rate  Pulmonary:      Effort: Pulmonary effort is normal    Abdominal:      Palpations: Abdomen is soft  Musculoskeletal:      Right knee: He exhibits no effusion  Comments: See orthopedic exam   Skin:     General: Skin is warm and dry  Neurological:      Mental Status: He is alert and oriented to person, place, and time  I have personally reviewed pertinent films in PACS    X-ray demonstrated well-positioned well-aligned cemented right total knee arthroplasty no sign of failure

## 2021-04-26 ENCOUNTER — OFFICE VISIT (OUTPATIENT)
Dept: PHYSICAL THERAPY | Facility: CLINIC | Age: 71
End: 2021-04-26
Payer: COMMERCIAL

## 2021-04-26 DIAGNOSIS — M25.561 BILATERAL CHRONIC KNEE PAIN: ICD-10-CM

## 2021-04-26 DIAGNOSIS — M17.11 PRIMARY OSTEOARTHRITIS OF RIGHT KNEE: Primary | ICD-10-CM

## 2021-04-26 DIAGNOSIS — M25.562 BILATERAL CHRONIC KNEE PAIN: ICD-10-CM

## 2021-04-26 DIAGNOSIS — G89.29 BILATERAL CHRONIC KNEE PAIN: ICD-10-CM

## 2021-04-26 DIAGNOSIS — I10 ESSENTIAL HYPERTENSION: ICD-10-CM

## 2021-04-26 PROCEDURE — 97110 THERAPEUTIC EXERCISES: CPT

## 2021-04-26 PROCEDURE — 97112 NEUROMUSCULAR REEDUCATION: CPT

## 2021-04-26 PROCEDURE — 97140 MANUAL THERAPY 1/> REGIONS: CPT

## 2021-04-26 RX ORDER — AMLODIPINE BESYLATE 5 MG/1
5 TABLET ORAL 2 TIMES DAILY
Qty: 180 TABLET | Refills: 3 | Status: SHIPPED | OUTPATIENT
Start: 2021-04-26 | End: 2021-07-12 | Stop reason: SDUPTHER

## 2021-04-26 RX ORDER — LABETALOL 200 MG/1
200 TABLET, FILM COATED ORAL EVERY 12 HOURS SCHEDULED
Qty: 90 TABLET | Refills: 3 | Status: SHIPPED | OUTPATIENT
Start: 2021-04-26 | End: 2021-07-12 | Stop reason: SDUPTHER

## 2021-04-26 RX ORDER — LOSARTAN POTASSIUM 50 MG/1
50 TABLET ORAL 2 TIMES DAILY
Qty: 90 TABLET | Refills: 3 | Status: SHIPPED | OUTPATIENT
Start: 2021-04-26 | End: 2021-07-12 | Stop reason: SDUPTHER

## 2021-04-26 NOTE — PROGRESS NOTES
Daily Note      Today's date: 2021  Patient name: Yaneli Day  : [birth year]  MRN: [id number]  Referring provider: Bee Lopez  Dx:   Encounter Diagnosis     ICD-10-CM    1  Primary osteoarthritis of right knee  M17 11    2  Bilateral chronic knee pain  M25 561     M25 562     G89 29        Subjective: Pt reports that the orthopedic surgeon is happy with his progress so far  Pt states that he did take a shower as directed by surgeon  Pt states that he does sometimes have pain while standing on the inner side of the right knee  Objective: See treatment diary below    Assessment: Pt tolerated treatment well  Pt introduced to bar squats and demonstrates the ability to squat to prison to parallel without increase in pain  Pt demonstrates improved eccentric control while stepping down forward or laterally, but requires bilateral handrails  Pt demonstrates improved consistency with increased step length with gait and did not require verbal cuing this visit  Plan: Continue per plan of care  Progress treatment as tolerated            Precautions: Hx of CVA; R total knee 21       6 7 8 9 5   Manuals 4/19/21 4/20/21 4/22/21 4/26/21 4/15/21   STM/MFR        Manual flexibility HS, gastrocs, quads R HS R HS, gastrocs  R HS R HS, gastrocs   Joint mobs knee        PROM R knee Flex/ext Flex/ext  Flex/ext Flex/ext   Neuro Re-Ed        Quad sets Reviewed    Reviewed   SLR 2x10 2x10  2x10 1# 2x10 2# 2x10   Clamshells 30x blue TB 30x blue 30x blue 30x blue    Bridging 15x 10x 20x  20x 15x   SLS        Lateral step down 20x level 2 uni rail 20x 6" B/L rail      Bar squat    10x to 1/2 depth            Ther Ex        Nustep 10' L3 10' L3 10' L3 10' L4 10' L3   Heel slides 20x w/ peanut SKTC 10x10s SKTC 10x10s SKTC 10x10s 20x w/ peanut   Gastroc stretch 3x15s  3x15s 3x15s 3x15s 3x15s   HS stretch 5x15s 3x15s 5x15s 3x15s  3x15s   Heel raises 20x 20x  20x      HS curl  20x  20x  20x      Step up 20x level 2 uni rail 20x level 2 uni rail to no HR 20x 6" step B/L rails 20x 6" step left HR 15x 6" step B/L rails    Lateral step up    20x 6" step B/L HR 15x 6" step B/L rails    Forward step down 20x level 2 B/L rail 20x level 2 B/L rail  20x 6" step B/L HR    Leg press   3x10 65# double leg             Ther Activity        Pt education Progress, step length Progress Progress Reviewed progress Reviewed gait pattern and progress   Squats     Sit to stand 10x high-low plinth   Gait  Reviewed increased step length                              Modalities        CP R knee 5' post  5' post 5' post 5' post 5' post

## 2021-04-27 ENCOUNTER — OFFICE VISIT (OUTPATIENT)
Dept: PHYSICAL THERAPY | Facility: CLINIC | Age: 71
End: 2021-04-27
Payer: COMMERCIAL

## 2021-04-27 DIAGNOSIS — M25.562 BILATERAL CHRONIC KNEE PAIN: ICD-10-CM

## 2021-04-27 DIAGNOSIS — M17.11 PRIMARY OSTEOARTHRITIS OF RIGHT KNEE: Primary | ICD-10-CM

## 2021-04-27 DIAGNOSIS — G89.29 BILATERAL CHRONIC KNEE PAIN: ICD-10-CM

## 2021-04-27 DIAGNOSIS — M25.561 BILATERAL CHRONIC KNEE PAIN: ICD-10-CM

## 2021-04-27 PROCEDURE — 97112 NEUROMUSCULAR REEDUCATION: CPT

## 2021-04-27 PROCEDURE — 97530 THERAPEUTIC ACTIVITIES: CPT

## 2021-04-27 PROCEDURE — 97110 THERAPEUTIC EXERCISES: CPT

## 2021-04-27 NOTE — PROGRESS NOTES
Daily Note      Today's date: 2021  Patient name: Joaquín Avalos  : [birth year]  MRN: [id number]  Referring provider: Andreea Fowler  Dx:   Encounter Diagnosis     ICD-10-CM    1  Primary osteoarthritis of right knee  M17 11    2  Bilateral chronic knee pain  M25 561     M25 562     G89 29        Subjective: Pt reports no pain in the right knee currently  Pt states that "the only time when I have pain is when I get up from a chair or bed "       Objective: See treatment diary below    Assessment: Pt tolerated treatment well  Pt does present with moderate impulsiveness and requires close supervision and verbal cuing with step exercises and squatting  Pt presents a potential safety risk because he attempts to perform more repetitions than assigned and continues to perform exercise despite rapid breathing rate and increase in wheezing  Pt does recover when taking seated rest breaks and drinking water  Pt had one misstep in which he attempted to step backwards onto the step immediately after PT adjusted step height before PT tested stability of step  Pt is limited in understanding safety cues due to language barrier, but responds well to tactile cues as an adjunctive measure  Pt presents with improved functional strength with squatting and ascending steps, but is still limited with descending steps and stepping backwards  Plan: Continue per plan of care  Progress treatment as tolerated            Precautions: Hx of CVA; R total knee 21       6 7 8 9 10   Manuals 21   STM/MFR        Manual flexibility HS, gastrocs, quads R HS R HS, gastrocs  R HS R HS, gastrocs   Joint mobs knee        PROM R knee Flex/ext Flex/ext  Flex/ext Flex/ext   Neuro Re-Ed        Quad sets Reviewed       SLR 2x10 2x10  2x10 1# 2x10 2# 2x10 2#   Clamshells 30x blue TB 30x blue 30x blue 30x blue 50x black   Bridging 15x 10x 20x  20x 30x   SLS        Lateral step down 20x level 2 uni rail 20x 6" B/L rail      Bar squat    10x to 1/2 depth 2x10 to parallel             Ther Ex        Nustep 10' L3 10' L3 10' L3 10' L4 10' L4   Heel slides 20x w/ peanut SKTC 10x10s SKTC 10x10s SKTC 10x10s    Gastroc stretch 3x15s  3x15s 3x15s 3x15s 3x15s   HS stretch 5x15s 3x15s 5x15s 3x15s  3x15s   Heel raises 20x 20x  20x      HS curl  20x  20x  20x      Step up 20x level 2 uni rail 20x level 2 uni rail to no HR 20x 6" step B/L rails 20x 6" step left HR 15x 6" step B/L rails    Lateral step up    20x 6" step B/L HR 20x 6" step unilateral rail    Forward step down 20x level 2 B/L rail 20x level 2 B/L rail  20x 6" step B/L HR 20x 6" step unilateral rail   Leg press   3x10 65# double leg             Ther Activity        Pt education Progress, step length Progress Progress Reviewed progress Reviewed safety with step exercises and squatting   Squats     Sit to stand no support high-low mat 2x10   Gait  Reviewed increased step length                              Modalities        CP R knee 5' post  5' post 5' post 5' post 10' post

## 2021-04-29 ENCOUNTER — OFFICE VISIT (OUTPATIENT)
Dept: PHYSICAL THERAPY | Facility: CLINIC | Age: 71
End: 2021-04-29
Payer: COMMERCIAL

## 2021-04-29 DIAGNOSIS — M25.562 BILATERAL CHRONIC KNEE PAIN: ICD-10-CM

## 2021-04-29 DIAGNOSIS — M25.561 BILATERAL CHRONIC KNEE PAIN: ICD-10-CM

## 2021-04-29 DIAGNOSIS — G89.29 BILATERAL CHRONIC KNEE PAIN: ICD-10-CM

## 2021-04-29 DIAGNOSIS — M17.11 PRIMARY OSTEOARTHRITIS OF RIGHT KNEE: Primary | ICD-10-CM

## 2021-04-29 DIAGNOSIS — Z01.818 PRE-OP EXAM: ICD-10-CM

## 2021-04-29 PROCEDURE — 97110 THERAPEUTIC EXERCISES: CPT

## 2021-04-29 NOTE — PROGRESS NOTES
Daily Note      Today's date: 2021  Patient name: Kenny Pena  : [birth year]  MRN: [id number]  Referring provider: Anna Church  Dx:   Encounter Diagnosis     ICD-10-CM    1  Primary osteoarthritis of right knee  M17 11    2  Bilateral chronic knee pain  M25 561     M25 562     G89 29    3  Pre-op exam  Z01 818        Subjective: Pt reports no pain in the right knee currently  Objective: See treatment diary below    Assessment: Pt tolerated treatment well  Pt would benefit from continued PT  , Pt demonstrated good form with therex today  and Pt presented with improved activity tolerance this visit  Attempted TM today for proper gait, pt continues to present with dec step length  Tolerated all exercises well overall  Plan: Continue per plan of care  Progress treatment as tolerated            Precautions: Hx of CVA; R total knee 21       7 8 9 10 11   Manuals 21   STM/MFR        Manual flexibility HS, gastrocs, quads R HS, gastrocs  R HS R HS, gastrocs R HS, gastrocs   Joint mobs knee        PROM R knee Flex/ext  Flex/ext Flex/ext Flex/ext   Neuro Re-Ed        Quad sets        SLR 2x10  2x10 1# 2x10 2# 2x10 2# 2x10 2#   Clamshells 30x blue 30x blue 30x blue 50x black 50x black   Bridging 10x 20x  20x 30x 30x   SLS        Lateral step down 20x 6" B/L rail       Bar squat   10x to 1/2 depth 2x10 to parallel   2x10 to parallel            Ther Ex        Nustep 10' L3 10' L3 10' L4 10' L4 10' L4   Heel slides SKTC 10x10s SKTC 10x10s SKTC 10x10s     Gastroc stretch 3x15s 3x15s 3x15s 3x15s 3x   HS stretch 3x15s 5x15s 3x15s  3x15s 3x   Heel raises 20x  20x       HS curl  20x  20x       Step up 20x level 2 uni rail to no HR 20x 6" step B/L rails 20x 6" step left HR 15x 6" step B/L rails  15x 6" step B/L rails    Lateral step up   20x 6" step B/L HR 20x 6" step unilateral rail  20x 6" step unilateral rail    Forward step down 20x level 2 B/L rail  20x 6" step B/L HR 20x 6" step unilateral rail 20x 6" step unilateral rail   Leg press  3x10 65# double leg              Ther Activity        Pt education Progress Progress Reviewed progress Reviewed safety with step exercises and squatting reviewed   Squats    Sit to stand no support high-low mat 2x10    Gait Reviewed increased step length                               Modalities        CP R knee 5' post 5' post 5' post 10' post 10' post

## 2021-04-30 ENCOUNTER — DOCUMENTATION (OUTPATIENT)
Dept: FAMILY MEDICINE CLINIC | Facility: CLINIC | Age: 71
End: 2021-04-30

## 2021-04-30 DIAGNOSIS — F32.4 MAJOR DEPRESSIVE DISORDER IN PARTIAL REMISSION, UNSPECIFIED WHETHER RECURRENT (HCC): ICD-10-CM

## 2021-04-30 RX ORDER — ESCITALOPRAM OXALATE 5 MG/1
5 TABLET ORAL
Qty: 90 TABLET | Refills: 3 | Status: SHIPPED | OUTPATIENT
Start: 2021-04-30 | End: 2021-07-12 | Stop reason: SDUPTHER

## 2021-05-03 ENCOUNTER — OFFICE VISIT (OUTPATIENT)
Dept: PHYSICAL THERAPY | Facility: CLINIC | Age: 71
End: 2021-05-03
Payer: COMMERCIAL

## 2021-05-03 DIAGNOSIS — G89.29 BILATERAL CHRONIC KNEE PAIN: ICD-10-CM

## 2021-05-03 DIAGNOSIS — M25.562 BILATERAL CHRONIC KNEE PAIN: ICD-10-CM

## 2021-05-03 DIAGNOSIS — M25.561 BILATERAL CHRONIC KNEE PAIN: ICD-10-CM

## 2021-05-03 DIAGNOSIS — M17.11 PRIMARY OSTEOARTHRITIS OF RIGHT KNEE: Primary | ICD-10-CM

## 2021-05-03 PROCEDURE — 97110 THERAPEUTIC EXERCISES: CPT

## 2021-05-03 NOTE — PROGRESS NOTES
Daily Note      Today's date: 5/3/2021  Patient name: Denise Healy  :   MRN: [id number]  Referring provider: Rose Armijo  Dx:   Encounter Diagnosis     ICD-10-CM    1  Primary osteoarthritis of right knee  M17 11    2  Bilateral chronic knee pain  M25 561     M25 562     G89 29        Subjective: Pt reports no pain in R knee  Objective: See treatment diary below    Assessment: Pt tolerated treatment well  Pt would benefit from continued PT  , Pt demonstrated good form with therex today  and Pt presented with improved activity tolerance this visit  127 deg AROM, pt reported lateral knee pain today with palpation, pt continues to demo gait deficits  Plan: Continue per plan of care  Progress treatment as tolerated            Precautions: Hx of CVA; R total knee 21       8 9 10 11 12   Manuals 4/22/21 4/26/21 4/27/21 4/29/21 5/3/21   STM/MFR        Manual flexibility HS, gastrocs, quads  R HS R HS, gastrocs R HS, gastrocs R HS, gastrocs   Joint mobs knee        PROM R knee  Flex/ext Flex/ext Flex/ext Flex/ext   Neuro Re-Ed        Quad sets        SLR 2x10 1# 2x10 2# 2x10 2# 2x10 2# 2x10   Clamshells 30x blue 30x blue 50x black 50x black 40x black   Bridging 20x  20x 30x 30x 40x   SLS        Lateral step down        Bar squat  10x to 1/2 depth 2x10 to parallel   2x10 to parallel  2x10           Ther Ex        Nustep 10' L3 10' L4 10' L4 10' L4 10' L4   Heel slides SKTC 10x10s SKTC 10x10s      Gastroc stretch 3x15s 3x15s 3x15s 3x 3x   HS stretch 5x15s 3x15s  3x15s 3x 3x   Heel raises 20x        HS curl  20x        Step up 20x 6" step B/L rails 20x 6" step left HR 15x 6" step B/L rails  15x 6" step B/L rails  20x 6" B/L rails   Lateral step up  20x 6" step B/L HR 20x 6" step unilateral rail  20x 6" step unilateral rail  20x 6" step   Forward step down  20x 6" step B/L HR 20x 6" step unilateral rail 20x 6" step unilateral rail 20x 6" step   Leg press 3x10 65# double leg Ther Activity        Pt education Progress Reviewed progress Reviewed safety with step exercises and squatting reviewed    Squats   Sit to stand no support high-low mat 2x10     Gait                                Modalities        CP R knee 5' post 5' post 10' post 10' post 10' post

## 2021-05-05 ENCOUNTER — OFFICE VISIT (OUTPATIENT)
Dept: PHYSICAL THERAPY | Facility: CLINIC | Age: 71
End: 2021-05-05
Payer: COMMERCIAL

## 2021-05-05 DIAGNOSIS — M25.562 BILATERAL CHRONIC KNEE PAIN: ICD-10-CM

## 2021-05-05 DIAGNOSIS — G89.29 BILATERAL CHRONIC KNEE PAIN: ICD-10-CM

## 2021-05-05 DIAGNOSIS — M17.11 PRIMARY OSTEOARTHRITIS OF RIGHT KNEE: Primary | ICD-10-CM

## 2021-05-05 DIAGNOSIS — M25.561 BILATERAL CHRONIC KNEE PAIN: ICD-10-CM

## 2021-05-05 PROCEDURE — 97110 THERAPEUTIC EXERCISES: CPT

## 2021-05-05 PROCEDURE — 97112 NEUROMUSCULAR REEDUCATION: CPT

## 2021-05-05 PROCEDURE — 97140 MANUAL THERAPY 1/> REGIONS: CPT

## 2021-05-05 NOTE — PROGRESS NOTES
PT Re-Evaluation     Today's date: 2021  Patient name: Kira Dixon  : [birth year]  MRN: [id number]  Referring provider: Aaron Grewal:   Encounter Diagnosis     ICD-10-CM    1  Primary osteoarthritis of right knee  M17 11    2  Bilateral chronic knee pain  M25 561     M25 562     G89 29                   Assessment  Assessment details: Kira Dixon is a 65 y o  male who presents to Physical Therapy at [place] [address] 3 days s/p R TKA, which occurred on 21  Pt ambulates without an assistive device with shortened step length, decreased gait speed, and antalgic gait pattern  Patient presents with the following impairments: right knee pain, increased RLE edema, gait dysfunction, limited right knee strength, and limited activity tolerance  Due to these impairments, patient has difficulty performing the following: ADL's, recreational activities, engaging in social activities, ambulation, stair negotiation, lifting/carrying, transfers, prolonged standing, squatting, kneeling, household chores, yard work, and shopping  Reviewed HEP, DVT/infection prophylaxis, car transfers, stair negotiation, and home setup with pt and family  Patient would benefit from skilled physical therapy services to address the above functional limitations and progress towards prior level of function and independence with home exercise program   Impairments: abnormal gait, abnormal muscle firing, abnormal or restricted ROM, activity intolerance, impaired balance, impaired physical strength, lacks appropriate home exercise program, pain with function, weight-bearing intolerance, poor posture  and poor body mechanics  Understanding of Dx/Px/POC: good   Prognosis: good    Goals  Short Term Goals (3 weeks; target date: 5/15/21)  1  [Goal Met] Patient will be independent with initial HEP  2  [Goal Met] Patient will demonstrate an increase in right knee flexion AROM to 100°     3  [Goal Met] Patient will demonstrate an increase in right knee strength of 1/2 grade on MMT  Long Term Goals (8 weeks; target date: 6/30/21)  1  [TBA] Patient will be independent with comprehensive HEP  2  [Goal Met] Patient will demonstrate an increase in right knee AROM to WNL in order to promote self-care activities pain-free  3  [Goal Met] Patient will demonstrate an increase in right knee strength to 4/5 on MMT  4  [60% Met] Patient will be able to perform a full, functional squat with proper mechanics  5  [40% Met] Patient will be able to ascend/descend 10 stairs reciprocally without use of handrail  6  [50% Met] Patient will be able to ambulate 300 ft on even and uneven surfaces without AD  Plan  Plan details: Patient has been educated on the facility's COVID precautions and procedures  Patient has also been educated on the facility's cancellation policy and has verbalized agreement with this     Patient would benefit from: skilled physical therapy  Planned modality interventions: cryotherapy, electrical stimulation/Russian stimulation, TENS, ultrasound, high voltage pulsed current: pain management, thermotherapy: hydrocollator packs, unattended electrical stimulation and high voltage pulsed current: spasm management  Planned therapy interventions: flexibility, functional ROM exercises, graded exercise, home exercise program, joint mobilization, manual therapy, neuromuscular re-education, patient education, strengthening, stretching, therapeutic exercise, therapeutic activities, activity modification, postural training, body mechanics training, graded activity, self care, muscle pump exercises, abdominal trunk stabilization, ADL retraining, ADL training, IADL retraining, breathing training, behavior modification, therapeutic training, transfer training, Mohan taping, gait training and graded motor  Frequency: 2x week  Duration in weeks: 4  Plan of Care beginning date: 5/5/2021  Plan of Care expiration date: 7/5/2021  Treatment plan discussed with: patient and family        Subjective Evaluation    History of Present Illness  Date of surgery: 2021  Mechanism of injury: Pt's history obtained from pt and pt's daughter, who is present for the evaluation to interpret  Pt's daughter states pt has had chronic bilateral knee pain of several months and has been limited with walking and stair climbing  Pt's daughter states that pt is a candidate for knee replacement of both sides, but has opted to go with the right knee first  Pt's daughter states that x-rays have showed advanced arthritis in both knees  Pt's daughter states that pt has not received injection treatment in the right knee in the past  Pt is scheduled for total knee arthroplasty for the right knee on 21 and scheduled to return to PT 3 days after      (21) Pt reports progress of over 70% since starting PT  Pt states that he does still have pain while standing in the right knee joint  Pt states that exercises while lying down do not cause pain  Pt states he wishes to get stronger and to get rid of knee pain       Pain  Current pain ratin  At best pain ratin  At worst pain ratin  Location: Right knee  Quality: dull ache  Relieving factors: rest  Aggravating factors: standing, walking, lifting and stair climbing  Progression: no change    Social Support  Steps to enter house: yes  2  Stairs in house: yes   10  Lives in: multiple-level home  Lives with: adult children, young children and spouse    Employment status: not working  Hand dominance: right  Exercise history: Every day - yoga      Diagnostic Tests  X-ray: abnormal  Patient Goals  Patient goals for therapy: decreased pain  Patient goal: To be able to walk better        Objective     Active Range of Motion     Right Knee   Flexion: 125 degrees  Extension: 0 degrees     Strength/Myotome Testing     Right Knee   Flexion: 4/5  Extension: 4/5  Quadriceps contraction: good    Ambulation     Comments   Ambulates with shortened step length bilaterally, which improves with verbal cuing     Observation  Right knee incision clean and healing well; scabbing along inferior 1/8 of scar with upper 7/8 closed and scarred    Timed-Up-and-Go Score: 20 3 seconds    Normative Values  Age Group Time (seconds)    60-70 8 1   70-80 9 2   80-90 11 3     Comments: requires hand push off to rise from chair - presents with shortened step length       Precautions: Hx of CVA; R total knee 4/5/21       9 10 11 12 13 (RE-EVAL)   Manuals 4/26/21 4/27/21 4/29/21 5/3/21 5/5/21   STM/MFR     Scar mobs R knee   Manual flexibility HS, gastrocs, quads R HS R HS, gastrocs R HS, gastrocs R HS, gastrocs    Joint mobs knee     Pat mobs gr III all planes   PROM R knee Flex/ext Flex/ext Flex/ext Flex/ext    Neuro Re-Ed        Quad sets        SLR 2x10 2# 2x10 2# 2x10 2# 2x10 2x10 2#   Clamshells 30x blue 50x black 50x black 40x black    Bridging 20x 30x 30x 40x 60x    SLS        Lateral step down        Bar squat 10x to 1/2 depth 2x10 to parallel   2x10 to parallel  2x10 Sit to stand 25x           Ther Ex        Nustep 10' L4 10' L4 10' L4 10' L4 10' L4   Heel slides SKTC 10x10s       Gastroc stretch 3x15s 3x15s 3x 3x    HS stretch 3x15s  3x15s 3x 3x    Heel raises        HS curl         Step up 20x 6" step left HR 15x 6" step B/L rails  15x 6" step B/L rails  20x 6" B/L rails 20x 6" unilateral rail   Lateral step up 20x 6" step B/L HR 20x 6" step unilateral rail  20x 6" step unilateral rail  20x 6" step    Forward step down 20x 6" step B/L HR 20x 6" step unilateral rail 20x 6" step unilateral rail 20x 6" step 20x 6" B/L rails   Leg press                Ther Activity        Pt education Reviewed progress Reviewed safety with step exercises and squatting reviewed  Progress   Squats  Sit to stand no support high-low mat 2x10      Gait                                Modalities        CP R knee 5' post 10' post 10' post 10' post 10' post

## 2021-05-10 ENCOUNTER — OFFICE VISIT (OUTPATIENT)
Dept: PHYSICAL THERAPY | Facility: CLINIC | Age: 71
End: 2021-05-10
Payer: COMMERCIAL

## 2021-05-10 DIAGNOSIS — M25.562 BILATERAL CHRONIC KNEE PAIN: ICD-10-CM

## 2021-05-10 DIAGNOSIS — G89.29 BILATERAL CHRONIC KNEE PAIN: ICD-10-CM

## 2021-05-10 DIAGNOSIS — M25.561 BILATERAL CHRONIC KNEE PAIN: ICD-10-CM

## 2021-05-10 DIAGNOSIS — M17.11 PRIMARY OSTEOARTHRITIS OF RIGHT KNEE: Primary | ICD-10-CM

## 2021-05-10 PROCEDURE — 97110 THERAPEUTIC EXERCISES: CPT

## 2021-05-10 PROCEDURE — 97112 NEUROMUSCULAR REEDUCATION: CPT

## 2021-05-10 NOTE — PROGRESS NOTES
Daily Note      Today's date: 5/10/2021  Patient name: Watson De La Cruz  : [birth year]  MRN: [id number]  Referring provider: Brooke Aquino  Dx:   Encounter Diagnosis     ICD-10-CM    1  Primary osteoarthritis of right knee  M17 11    2  Bilateral chronic knee pain  M25 561     M25 562     G89 29        Subjective: Pt reports that while standing, he still has pain in the left knee (points to superior/lateral patella), but otherwise has no pain  Objective: See treatment diary below; incision at left knee found to be clean, healing well and smooth in appearance  Assessment: Pt tolerated treatment well  Pt introduced to hurdles for visual and objective feedback of increasing step length  Pt demonstrated inconsistent ability to step over 4" hurdles spaced about 1 foot apart  Pt demonstrated decreased balance confidence after making 2 errors and placed one hand on PT's shoulder while performing this exercise  Pt provided with verbal cuing to continue without support, but with PT CG  Pt demonstrated the ability to step over hurdles, but most frequent errors were made on the last mesha  Pt is progressing well in strength and continues to present with shortened step length immediately after performing sit to stand, but improves after taking several steps  Plan: Continue per plan of care  Progress treatment as tolerated            Precautions: Hx of CVA; R total knee 21       10 11 12 13 (RE-EVAL) 14   Manuals 4/27/21 4/29/21 5/3/21 5/5/21 5/10/21   STM/MFR    Scar mobs R knee    Manual flexibility HS, gastrocs, quads R HS, gastrocs R HS, gastrocs R HS, gastrocs     Joint mobs knee    Pat mobs gr III all planes    PROM R knee Flex/ext Flex/ext Flex/ext     Neuro Re-Ed        Quad sets        SLR 2x10 2# 2x10 2# 2x10 2x10 2# 2x10 2#   Clamshells 50x black 50x black 40x black  40x black   Bridging 30x 30x 40x 60x  50x    SLS        Lateral step down        Bar squat 2x10 to parallel   2x10 to parallel 2x10 Sit to stand 25x 2x10 chair w/ airex   Hurdles      4 hurdles, 4 inch, 8x    Ther Ex        Nustep 10' L4 10' L4 10' L4 10' L4 10' L4   Heel slides        Gastroc stretch 3x15s 3x 3x     HS stretch 3x15s 3x 3x     Heel raises     20x    HS curl      20x    Step up 15x 6" step B/L rails  15x 6" step B/L rails  20x 6" B/L rails 20x 6" unilateral rail 20x 6" unilateral rail   Lateral step up 20x 6" step unilateral rail  20x 6" step unilateral rail  20x 6" step     Forward step down 20x 6" step unilateral rail 20x 6" step unilateral rail 20x 6" step 20x 6" B/L rails 20x 6" B/L rails    Leg press                Ther Activity        Pt education Reviewed safety with step exercises and squatting reviewed  Progress    Squats Sit to stand no support high-low mat 2x10       Gait                                Modalities        CP R knee 10' post 10' post 10' post 10' post  5' post

## 2021-05-12 ENCOUNTER — OFFICE VISIT (OUTPATIENT)
Dept: PHYSICAL THERAPY | Facility: CLINIC | Age: 71
End: 2021-05-12
Payer: COMMERCIAL

## 2021-05-12 DIAGNOSIS — M17.11 PRIMARY OSTEOARTHRITIS OF RIGHT KNEE: Primary | ICD-10-CM

## 2021-05-12 DIAGNOSIS — G89.29 BILATERAL CHRONIC KNEE PAIN: ICD-10-CM

## 2021-05-12 DIAGNOSIS — M25.561 BILATERAL CHRONIC KNEE PAIN: ICD-10-CM

## 2021-05-12 DIAGNOSIS — M25.562 BILATERAL CHRONIC KNEE PAIN: ICD-10-CM

## 2021-05-12 PROCEDURE — 97110 THERAPEUTIC EXERCISES: CPT

## 2021-05-12 NOTE — PROGRESS NOTES
Daily Note      Today's date: 2021  Patient name: Mariela Gutierrez  : [birth year]  MRN: [id number]  Referring provider: Kianna Neely  Dx:   Encounter Diagnosis     ICD-10-CM    1  Primary osteoarthritis of right knee  M17 11    2  Bilateral chronic knee pain  M25 561     M25 562     G89 29        Subjective: Pt reports no pain in the right knee currently;       Objective: See treatment diary below; pt arrived ~10 minute late to start of session    Assessment: Pt tolerated treatment well  Pt attempted forward step up on level 3 step with unilateral railing and demonstrated difficulty doing so  Pt attempted to reach for PT's shoulder to the left in order to step up but stated it was too difficult to do without both hands  This exercise was modified by decreasing step height and pt performed with good form with unilateral HR  Pt also progressed to upright bike and tolerated well  Pt notes that only stepping exercises are particularly challenging  Pt requires minimal VC in order to ambulate with increased step length  Plan: Continue per plan of care  Progress as able with emphasis on functional strength (stairs, squatting, ambulation)          Precautions: Hx of CVA; R total knee 21       15 11 12 13 (RE-EVAL) 14   Manuals 5/12/21 4/29/21 5/3/21 5/5/21 5/10/21   STM/MFR    Scar mobs R knee    Manual flexibility HS, gastrocs, quads  R HS, gastrocs R HS, gastrocs     Joint mobs knee    Pat mobs gr III all planes    PROM R knee  Flex/ext Flex/ext     Neuro Re-Ed        Quad sets        SLR 2x10 2# 2x10 2# 2x10 2x10 2# 2x10 2#   Clamshells 40x black 50x black 40x black  40x black   Bridging 40x  30x 40x 60x  50x    SLS        Lateral step down        Bar squat 2x10 to parallel   2x10 to parallel  2x10 Sit to stand 25x 2x10 chair w/ airex   Hurdles      4 hurdles, 4 inch, 8x    Ther Ex        Nustep Upright bike   9' L4-6 10' L4 10' L4 10' L4 10' L4   Heel slides        Gastroc stretch  3x 3x     HS stretch  3x 3x     Heel raises     20x    HS curl      20x    Step up 20x level 2 step unilateral rail  15x 6" step B/L rails  20x 6" B/L rails 20x 6" unilateral rail 20x 6" unilateral rail   Lateral step up  20x 6" step unilateral rail  20x 6" step     Forward step down  20x 6" step unilateral rail 20x 6" step 20x 6" B/L rails 20x 6" B/L rails    Leg press                Ther Activity        Pt education  reviewed  Progress    Squats Sit to stand 2x10        Gait                                Modalities        CP R knee 10' post 10' post 10' post 10' post  5' post

## 2021-05-17 ENCOUNTER — OFFICE VISIT (OUTPATIENT)
Dept: PHYSICAL THERAPY | Facility: CLINIC | Age: 71
End: 2021-05-17
Payer: COMMERCIAL

## 2021-05-17 DIAGNOSIS — M25.561 BILATERAL CHRONIC KNEE PAIN: ICD-10-CM

## 2021-05-17 DIAGNOSIS — M25.562 BILATERAL CHRONIC KNEE PAIN: ICD-10-CM

## 2021-05-17 DIAGNOSIS — G89.29 BILATERAL CHRONIC KNEE PAIN: ICD-10-CM

## 2021-05-17 DIAGNOSIS — M17.11 PRIMARY OSTEOARTHRITIS OF RIGHT KNEE: Primary | ICD-10-CM

## 2021-05-17 PROCEDURE — 97112 NEUROMUSCULAR REEDUCATION: CPT

## 2021-05-17 PROCEDURE — 97110 THERAPEUTIC EXERCISES: CPT

## 2021-05-17 NOTE — PROGRESS NOTES
Daily Note      Today's date: 2021  Patient name: Glenny Sanchez  :   MRN: [id number]  Referring provider: Lisa Camacho  Dx:   Encounter Diagnosis     ICD-10-CM    1  Primary osteoarthritis of right knee  M17 11    2  Bilateral chronic knee pain  M25 561     M25 562     G89 29        Subjective: Pt reports no pain in the right knee, however he states "when standing, pain only"       Objective: See treatment diary below; pt arrived ~10 minute late to start of session    Assessment: Pt tolerated treatment well  Pt able to perform step ups with inc level, and one UE support, difficulty but able to perform  Continues to demo gait deviations with cues for inc step length  Plan: Continue per plan of care  Progress as able with emphasis on functional strength (stairs, squatting, ambulation)          Precautions: Hx of CVA; R total knee 21       15 16 12 13 (RE-EVAL) 14   Manuals 5/12/21 5/17/21 5/3/21 5/5/21 5/10/21   STM/MFR    Scar mobs R knee    Manual flexibility HS, gastrocs, quads   R HS, gastrocs     Joint mobs knee    Pat mobs gr III all planes    PROM R knee   Flex/ext     Neuro Re-Ed        Quad sets        SLR 2x10 2# 2x10 2# 2x10 2x10 2# 2x10 2#   Clamshells 40x black 50x black 40x black  40x black   Bridging 40x  50 40x 60x  50x    SLS        Lateral step down        Bar squat 2x10 to parallel   2x10 to parallel   2x10 Sit to stand 25x 2x10 chair w/ airex   Hurdles      4 hurdles, 4 inch, 8x    Ther Ex        Nustep Upright bike   9' L4-6 Upright bike   9' L4-6 10' L4 10' L4 10' L4   Heel slides        Gastroc stretch   3x     HS stretch   3x     Heel raises     20x    HS curl      20x    Step up 20x level 2 step unilateral rail  20x Lvl 3 Uni UE 20x 6" B/L rails 20x 6" unilateral rail 20x 6" unilateral rail   Lateral step up   20x 6" step     Forward step down   20x 6" step 20x 6" B/L rails 20x 6" B/L rails    Leg press                Ther Activity        Pt education Progress    Squats Sit to stand 2x10        Gait                                Modalities        CP R knee 10' post  10' post 10' post  5' post

## 2021-05-19 ENCOUNTER — OFFICE VISIT (OUTPATIENT)
Dept: PHYSICAL THERAPY | Facility: CLINIC | Age: 71
End: 2021-05-19
Payer: COMMERCIAL

## 2021-05-19 ENCOUNTER — OFFICE VISIT (OUTPATIENT)
Dept: OBGYN CLINIC | Facility: CLINIC | Age: 71
End: 2021-05-19

## 2021-05-19 VITALS
WEIGHT: 173 LBS | DIASTOLIC BLOOD PRESSURE: 77 MMHG | HEART RATE: 70 BPM | SYSTOLIC BLOOD PRESSURE: 138 MMHG | BODY MASS INDEX: 22.2 KG/M2 | HEIGHT: 74 IN

## 2021-05-19 DIAGNOSIS — M25.562 BILATERAL CHRONIC KNEE PAIN: ICD-10-CM

## 2021-05-19 DIAGNOSIS — M17.11 PRIMARY OSTEOARTHRITIS OF RIGHT KNEE: Primary | ICD-10-CM

## 2021-05-19 DIAGNOSIS — M17.12 PRIMARY OSTEOARTHRITIS OF LEFT KNEE: ICD-10-CM

## 2021-05-19 DIAGNOSIS — Z47.1 AFTERCARE FOLLOWING RIGHT KNEE JOINT REPLACEMENT SURGERY: ICD-10-CM

## 2021-05-19 DIAGNOSIS — G89.29 BILATERAL CHRONIC KNEE PAIN: ICD-10-CM

## 2021-05-19 DIAGNOSIS — M25.561 BILATERAL CHRONIC KNEE PAIN: ICD-10-CM

## 2021-05-19 DIAGNOSIS — Z96.651 STATUS POST TOTAL RIGHT KNEE REPLACEMENT USING CEMENT: Primary | ICD-10-CM

## 2021-05-19 DIAGNOSIS — Z96.651 AFTERCARE FOLLOWING RIGHT KNEE JOINT REPLACEMENT SURGERY: ICD-10-CM

## 2021-05-19 PROCEDURE — 97110 THERAPEUTIC EXERCISES: CPT

## 2021-05-19 PROCEDURE — 99024 POSTOP FOLLOW-UP VISIT: CPT | Performed by: ORTHOPAEDIC SURGERY

## 2021-05-19 NOTE — PROGRESS NOTES
Daily Note      Today's date: 2021  Patient name: Elizabeth Falcon  :   MRN: [id number]  Referring provider: Chante Sullivan  Dx:   Encounter Diagnosis     ICD-10-CM    1  Primary osteoarthritis of right knee  M17 11    2  Bilateral chronic knee pain  M25 561     M25 562     G89 29        Subjective: Pt reports no pain in the right knee, however he states "when standing, pain only"         Objective: See treatment diary below, Pt arrived 10 min late, not able to accommodate due to lunch      Assessment: Pt tolerated treatment well  Pt able to perform all exercises with good muscle endurance and strength, however, he reported pain with STS with 2 foam pads  Pt experienced slight LOB with ladder long stepping, able to self correct  Plan: Continue per plan of care  Progress as able with emphasis on functional strength (stairs, squatting, ambulation)          Precautions: Hx of CVA; R total knee 21       15 16 17  14   Manuals 5/12/21 5/17/21 5/17/21  5/10/21   STM/MFR        Manual flexibility HS, gastrocs, quads        Joint mobs knee        PROM R knee        Neuro Re-Ed        Quad sets        SLR 2x10 2# 2x10 2# 20x 2#  2x10 2#   Clamshells 40x black 50x black 40x black  40x black   Hip Add w ball   40x     Bridging 40x  50 50x  50x    SLS        Lateral step down        Bar squat 2x10 to parallel   2x10 to parallel   2x10  parallel  2x10 chair w/ airex   Hurdles      4 hurdles, 4 inch, 8x    Ther Ex        Nustep Upright bike   9' L4-6 Upright bike   9' L4-6 Not today (late)  10' L4   Heel slides        Gastroc stretch        HS stretch        Heel raises     20x    HS curl      20x    Step up 20x level 2 step unilateral rail  20x Lvl 3 Uni UE 20x Lvl 3 Uni UE  20x 6" unilateral rail   Lateral step up        Forward step down     20x 6" B/L rails    Leg press                Ther Activity        Pt education        Squats Sit to stand 2x10   Sit to stand 2x10      Gait        Ladder stepping   Fwd every other box 10x                     Modalities        CP R knee 10' post  10' post  5' post

## 2021-05-19 NOTE — PROGRESS NOTES
Assessment/Plan:  1  Status post total right knee replacement using cement     2  Aftercare following right knee joint replacement surgery     3  Primary osteoarthritis of left knee  CBC and differential    Comprehensive metabolic panel    CBC and differential    Comprehensive metabolic panel     Patient is a pleasant 49-year-old gentleman presenting today 6 weeks after undergoing a right total knee arthroplasty  He is doing very well at this time  He has regained full range of motion and has improved his strength and endurance  We encouraged him to continue working with physical therapy  He has now completed his DVT prophylaxis  He did inquire about pursuing a left total knee arthroplasty and possible timing  We would like him to return in 4 weeks for re-evaluation  Prior to that appointment, he should go for a CBC and CMP to determine if he is physiologically healed enough to proceed with a left total knee arthroplasty  He and his daughter expressed understanding all of her questions were addressed today    Subjective: 6 weeks s/p right TKA    Patient ID: Zachary Tobias is a 66 y o  male  Patient is a pleasant 49-year-old gentleman presenting today with his daughter, who is helping as our , now 6 weeks after undergoing a right total knee arthroplasty  He reports that he has been doing very well  He has been working with physical therapy and does not have any significant pain in the knee except after therapy  He has now completed his aspirin for DVT prophylaxis  He does continue have daily basis is left knee, which has end-stage underlying osteoarthritis  Review of Systems   Constitutional: Negative  HENT: Negative  Eyes: Negative  Respiratory: Negative  Cardiovascular: Negative  Gastrointestinal: Negative  Endocrine: Negative  Genitourinary: Negative  Musculoskeletal: Positive for arthralgias, joint swelling and myalgias  Skin: Negative      Allergic/Immunologic: Negative  Neurological: Negative  Hematological: Negative  Psychiatric/Behavioral: Negative            Past Medical History:   Diagnosis Date    Anxiety     Arthritis     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 ([facility] [address] [facility]     Hyperlipidemia     Hypertension     Stroke ([facility] [address] ) 2005       Past Surgical History:   Procedure Laterality Date    HERNIA REPAIR      [place] TOTAL KNEE ARTHROPLASTY Right 4/5/2021    Procedure: ARTHROPLASTY KNEE TOTAL;  Surgeon: Kole Aguilar DO;  Location: [facility];  Service: Orthopedics       Family History   Problem Relation Age of Onset    Stroke Mother     Stroke Father        Social History     Occupational History    Not on file   Tobacco Use    Smoking status: Never Smoker    Smokeless tobacco: Never Used   Substance and Sexual Activity    Alcohol use: Never     Frequency: Never    Drug use: Never    Sexual activity: Not Currently         Current Outpatient Medications:     acetaminophen (TYLENOL) 325 mg tablet, Take 3 tablets (975 mg total) by mouth every 8 (eight) hours, Disp:  , Rfl: 0    amLODIPine (NORVASC) 5 mg tablet, Take 1 tablet (5 mg total) by mouth 2 (two) times a day, Disp: 180 tablet, Rfl: 3    aspirin 325 mg tablet, Take 1 tablet (325 mg total) by mouth 2 (two) times a day, Disp: 84 tablet, Rfl: 0    atorvastatin (LIPITOR) 10 mg tablet, Take 1 tablet (10 mg total) by mouth daily with dinner, Disp: 90 tablet, Rfl: 3    docusate sodium (COLACE) 100 mg capsule, Take 1 capsule (100 mg total) by mouth 2 (two) times a day, Disp: 10 capsule, Rfl: 0    escitalopram (LEXAPRO) 5 mg tablet, Take 1 tablet (5 mg total) by mouth daily at bedtime, Disp: 90 tablet, Rfl: 3    labetalol (NORMODYNE) 200 mg tablet, Take 1 tablet (200 mg total) by mouth every 12 (twelve) hours, Disp: 90 tablet, Rfl: 3    losartan (COZAAR) 50 mg tablet, Take 1 tablet (50 mg total) by mouth 2 (two) times a day, Disp: 90 tablet, Rfl: 3    oxyCODONE (ROXICODONE) 5 mg immediate release tablet, Take 1-2 tablets by mouth every 4-6 hours as needed for pain, Disp: 60 tablet, Rfl: 0    polyethylene glycol (MIRALAX) 17 g packet, Take 17 g by mouth daily as needed (constipation) for up to 14 days, Disp: 14 each, Rfl: 0    No Known Allergies    Objective:  Vitals:    05/19/21 1339   BP: 138/77   Pulse: 70       Body mass index is 22 21 kg/m²  Right Knee Exam     Muscle Strength   The patient has normal right knee strength  Tenderness   The patient is experiencing no tenderness  Range of Motion   Extension:  0 normal   Flexion:  130 normal     Tests   Varus: negative Valgus: negative  Drawer:  Anterior - negative        Other   Erythema: absent  Scars: present  Sensation: normal  Pulse: present  Swelling: none  Effusion: no effusion present    Comments:    Anterior knee  Incision healed well  No sign of infection   range of motion without crepitance  Prosthesis is stable at 0° 30° 90°   compartments soft   neurovascular intact          Observations     Right Knee   Negative for effusion  Physical Exam  Vitals signs and nursing note reviewed  Constitutional:       Appearance: He is well-developed  Comments: Body mass index is 22 21 kg/m²  HENT:      Head: Normocephalic and atraumatic  Right Ear: External ear normal       Left Ear: External ear normal    Neck:      Musculoskeletal: Normal range of motion  Cardiovascular:      Rate and Rhythm: Normal rate  Pulmonary:      Effort: Pulmonary effort is normal    Abdominal:      Palpations: Abdomen is soft  Musculoskeletal:      Right knee: He exhibits no effusion  Comments: See ortho exam   Skin:     General: Skin is warm and dry  Neurological:      Mental Status: He is alert and oriented to person, place, and time  Psychiatric:         Behavior: Behavior normal          Thought Content:  Thought content normal          Judgment: Judgment normal

## 2021-05-24 ENCOUNTER — EVALUATION (OUTPATIENT)
Dept: PHYSICAL THERAPY | Facility: CLINIC | Age: 71
End: 2021-05-24
Payer: COMMERCIAL

## 2021-05-24 DIAGNOSIS — M17.11 PRIMARY OSTEOARTHRITIS OF RIGHT KNEE: Primary | ICD-10-CM

## 2021-05-24 DIAGNOSIS — G89.29 BILATERAL CHRONIC KNEE PAIN: ICD-10-CM

## 2021-05-24 DIAGNOSIS — M25.562 BILATERAL CHRONIC KNEE PAIN: ICD-10-CM

## 2021-05-24 DIAGNOSIS — M25.561 BILATERAL CHRONIC KNEE PAIN: ICD-10-CM

## 2021-05-24 PROCEDURE — 97110 THERAPEUTIC EXERCISES: CPT

## 2021-05-24 PROCEDURE — 97112 NEUROMUSCULAR REEDUCATION: CPT

## 2021-05-24 NOTE — PROGRESS NOTES
Daily Note      Today's date: 2021  Patient name: Yeni Sawyer  : 1  MRN: [id number]  Referring provider: John Jenkins  Dx:   Encounter Diagnosis     ICD-10-CM    1  Primary osteoarthritis of right knee  M17 11    2  Bilateral chronic knee pain  M25 561     M25 562     G89 29        Subjective: Pt reports no pain in the right knee currently  Pt states that he continues to experience mild (lateral jt line) right knee pain during sit to stand transfers and while walking  Pt denies pain with palpation over the lateral patella and lateral jt line  Objective: See treatment diary below; pt arrived 8 min late to scheduled start; R knee PROM = 0-123° pain-free    Assessment: Pt tolerated treatment well  Pt introduced to functional walks with emphasis on hip flexion and heel strike  Pt presented with good carryover from exaggerated walk to increased step length  Pt presents with improved eccentric control on 6" step as well, requiring only unilateral HR  Plan: Continue per plan of care  Progress treatment as tolerated            Precautions: Hx of CVA; R total knee 21       15 16 17 18    Manuals 21    STM/MFR        Manual flexibility HS, gastrocs, quads        Joint mobs knee        PROM R knee        Neuro Re-Ed        Quad sets        SLR 2x10 2# 2x10 2# 20x 2# 2x10 3#    Clamshells 40x black 50x black 40x black 50x black    Hip Add w ball   40x     Bridging 40x  50 50x 35x     SLS        Lateral step down        Bar squat 2x10 to parallel   2x10 to parallel   2x10  parallel     Hurdles         Ther Ex        Nustep Upright bike   9' L4-6 Upright bike   9' L4-6 Not today (late) 11' L4     Heel slides        Gastroc stretch        HS stretch    D/C    Heel raises        HS curl         Step up 20x level 2 step unilateral rail  20x Lvl 3 Uni UE 20x Lvl 3 Uni UE 20x 6" step unilateral rail    Lateral step up        Forward step down    20x 6" step unilateral rail    Leg press                Ther Activity        Pt education        Squats Sit to stand 2x10   Sit to stand 2x10      Gait    Functional walks 6x10 ft w/ uni HR    Ladder stepping   Fwd every other box 10x                     Modalities        CP R knee 10' post  10' post 10' post

## 2021-05-26 ENCOUNTER — OFFICE VISIT (OUTPATIENT)
Dept: PHYSICAL THERAPY | Facility: CLINIC | Age: 71
End: 2021-05-26
Payer: COMMERCIAL

## 2021-05-26 DIAGNOSIS — M25.561 BILATERAL CHRONIC KNEE PAIN: ICD-10-CM

## 2021-05-26 DIAGNOSIS — G89.29 BILATERAL CHRONIC KNEE PAIN: ICD-10-CM

## 2021-05-26 DIAGNOSIS — M17.11 PRIMARY OSTEOARTHRITIS OF RIGHT KNEE: Primary | ICD-10-CM

## 2021-05-26 DIAGNOSIS — M25.562 BILATERAL CHRONIC KNEE PAIN: ICD-10-CM

## 2021-05-26 PROCEDURE — 97112 NEUROMUSCULAR REEDUCATION: CPT

## 2021-05-26 PROCEDURE — 97110 THERAPEUTIC EXERCISES: CPT

## 2021-05-26 NOTE — PROGRESS NOTES
Daily Note      Today's date: 2021  Patient name: Slade Sorensen  :   MRN: [id number]  Referring provider: Neo Briones  Dx:   Encounter Diagnosis     ICD-10-CM    1  Primary osteoarthritis of right knee  M17 11    2  Bilateral chronic knee pain  M25 561     M25 562     G89 29        Subjective: Pt reports no pain in the right knee currently  Objective: See treatment diary below; pt arrived 15 min late today    Assessment: Pt tolerated treatment well  Pt is progressing well toward discharge, which is anticipated over the next two weeks  Pt would benefit from continued physical therapy in order to correct shortened step length, and in order to improve functional mobility through strengthening with stairs and squatting movements  Pt attempted sit to stand transfer sitting on chair with one foam pad  Pt noted difficulty and was unable to perform this with one hand push off, likely due to hip height below knee height  Pt attempted this with two foam pads on the chair (hip flexion of 90°) and was unable to perform STS due to right knee pain  Plan: Continue per plan of care  Progress treatment as tolerated            Precautions: Hx of CVA; R total knee 21       15 16 17 18 19   Manuals 21   STM/MFR        Manual flexibility HS, gastrocs, quads        Joint mobs knee        PROM R knee        Neuro Re-Ed        Quad sets        SLR 2x10 2# 2x10 2# 20x 2# 2x10 3#    Clamshells 40x black 50x black 40x black 50x black    Hip Add w ball   40x     Bridging 40x  50 50x 35x     SLS        Lateral step down     15x B/L 6"    Bar squat 2x10 to parallel   2x10 to parallel   2x10  parallel  2x10    Hurdles         Ther Ex        Nustep Upright bike   9' L4-6 Upright bike   9' L4-6 Not today (late) 6' L4  10' L4   Heel slides     Lunge knee flexion stretch 15x5s   Gastroc stretch        HS stretch    D/C    Heel raises        HS curl         Step up 20x level 2 step unilateral rail  20x Lvl 3 Uni UE 20x Lvl 3 Uni UE 20x 6" step unilateral rail 20x 6"    Lateral step up     20x 6"    Forward step down    20x 6" step unilateral rail 20x 6"    Leg press                Ther Activity        Pt education        Squats Sit to stand 2x10   Sit to stand 2x10   Attempted *pain   Gait    Functional walks 6x10 ft w/ uni HR    Ladder stepping   Fwd every other box 10x                     Modalities        CP R knee 10' post  10' post 10' post  10' post

## 2021-06-01 ENCOUNTER — OFFICE VISIT (OUTPATIENT)
Dept: PHYSICAL THERAPY | Facility: CLINIC | Age: 71
End: 2021-06-01
Payer: COMMERCIAL

## 2021-06-01 DIAGNOSIS — M17.11 PRIMARY OSTEOARTHRITIS OF RIGHT KNEE: Primary | ICD-10-CM

## 2021-06-01 DIAGNOSIS — M25.562 BILATERAL CHRONIC KNEE PAIN: ICD-10-CM

## 2021-06-01 DIAGNOSIS — G89.29 BILATERAL CHRONIC KNEE PAIN: ICD-10-CM

## 2021-06-01 DIAGNOSIS — M25.561 BILATERAL CHRONIC KNEE PAIN: ICD-10-CM

## 2021-06-01 PROCEDURE — 97112 NEUROMUSCULAR REEDUCATION: CPT

## 2021-06-01 PROCEDURE — 97110 THERAPEUTIC EXERCISES: CPT

## 2021-06-01 NOTE — PROGRESS NOTES
Daily Note      Today's date: 2021  Patient name: Idalia Keith  : [birth year]  MRN: [id number]  Referring provider: Bonita De Jesus  Dx:   Encounter Diagnosis     ICD-10-CM    1  Primary osteoarthritis of right knee  M17 11    2  Bilateral chronic knee pain  M25 561     M25 562     G89 29        Subjective: Pt reports that he has not had pain in the right knee over the past several days, but his left knee is beginning to bother him more  Objective: See treatment diary below    Assessment: Pt tolerated treatment well  Pt provided with verbal cues in order to control return to starting position with leg press  Pt demonstrated the ability to improve controlled lowering in response to verbal cuing  Pt demonstrates slight improvement in eccentric control during forward step down  Pt is demonstrating good carryover of HEP and would benefit from comprehensive HEP print out in order to continue progress independently  Plan: Continue per plan of care  Plan to discharge to HEP next session          Precautions: Hx of CVA; R total knee 21       20 16 17 18 19   Manuals 21   STM/MFR        Manual flexibility HS, gastrocs, quads        Joint mobs knee        PROM R knee        Neuro Re-Ed        Quad sets        SLR 3x10 2# 2x10 2# 20x 2# 2x10 3#    Clamshells 40x black 50x black 40x black 50x black    Hip Add w ball   40x     Bridging 35x  50 50x 35x     SLS        Lateral step down     15x B/L 6"    Bar squat 2x10 to parallel   2x10 to parallel   2x10  parallel  2x10    Hurdles         Ther Ex        Nustep 11' L4 Upright bike   9' L4-6 Not today (late) 11' L4  10' L4   Heel slides     Lunge knee flexion stretch 15x5s   Gastroc stretch        Heel raises        HS curl         Step up 30x 6" unilateral rail 20x Lvl 3 Uni UE 20x Lvl 3 Uni UE 20x 6" step unilateral rail 20x 6"    Lateral step up 30x 6" unilateral rail    20x 6"    Forward step down 20x 6" bilateral rails   20x 6" step unilateral rail 20x 6"    Leg press 3x10 75#               Ther Activity        Pt education        Squats Sit to stand 2x10   Sit to stand 2x10   Attempted *pain   Gait 50 ft x 2 with VC for inc step length   Functional walks 6x10 ft w/ uni HR    Ladder stepping   Fwd every other box 10x                     Modalities        CP R knee 5' post  10' post 10' post  10' post

## 2021-06-03 ENCOUNTER — APPOINTMENT (OUTPATIENT)
Dept: PHYSICAL THERAPY | Facility: CLINIC | Age: 71
End: 2021-06-03
Payer: COMMERCIAL

## 2021-06-03 DIAGNOSIS — Z96.651 STATUS POST TOTAL KNEE REPLACEMENT USING CEMENT, RIGHT: ICD-10-CM

## 2021-06-03 RX ORDER — ASPIRIN 325 MG
TABLET, DELAYED RELEASE (ENTERIC COATED) ORAL
Qty: 24 TABLET | Refills: 0 | OUTPATIENT
Start: 2021-06-03

## 2021-06-07 ENCOUNTER — APPOINTMENT (OUTPATIENT)
Dept: PHYSICAL THERAPY | Facility: CLINIC | Age: 71
End: 2021-06-07
Payer: COMMERCIAL

## 2021-06-07 NOTE — TELEPHONE ENCOUNTER
See previous note   he has already completed DVT prophylaxis and does not need more aspirin   Thanks

## 2021-06-08 ENCOUNTER — APPOINTMENT (OUTPATIENT)
Dept: PHYSICAL THERAPY | Facility: CLINIC | Age: 71
End: 2021-06-08
Payer: COMMERCIAL

## 2021-06-09 ENCOUNTER — OFFICE VISIT (OUTPATIENT)
Dept: PHYSICAL THERAPY | Facility: CLINIC | Age: 71
End: 2021-06-09
Payer: COMMERCIAL

## 2021-06-09 DIAGNOSIS — G89.29 BILATERAL CHRONIC KNEE PAIN: ICD-10-CM

## 2021-06-09 DIAGNOSIS — M25.561 BILATERAL CHRONIC KNEE PAIN: ICD-10-CM

## 2021-06-09 DIAGNOSIS — M25.562 BILATERAL CHRONIC KNEE PAIN: ICD-10-CM

## 2021-06-09 DIAGNOSIS — M17.11 PRIMARY OSTEOARTHRITIS OF RIGHT KNEE: Primary | ICD-10-CM

## 2021-06-09 PROCEDURE — 97110 THERAPEUTIC EXERCISES: CPT

## 2021-06-09 PROCEDURE — 97112 NEUROMUSCULAR REEDUCATION: CPT

## 2021-06-09 NOTE — PROGRESS NOTES
PT Discharge      Today's date: 2021  Patient name: Watson De La Cruz  :   MRN: [id number]  Referring provider: Brooke Aquino  Dx:   Encounter Diagnosis     ICD-10-CM    1  Primary osteoarthritis of right knee  M17 11    2  Bilateral chronic knee pain  M25 561     M25 562     G89 29        Subjective: Pt reports no pain in the R knee  Pt states that he is feeling better and eventually plans to undergo surgery for L knee  Pt expresses that he is thankful for physical therapy and it has helped a lot  Objective: See treatment diary below    Assessment: Watson De La Cruz is a 65 y o  male who presents to Physical Therapy at [place] [address] 3 days s/p R TKA, which occurred on 21  Pt ambulates without an assistive device with shortened step length, decreased gait speed, and antalgic gait pattern  Patient presents with the following impairments: right knee pain, increased RLE edema, gait dysfunction, limited right knee strength, and limited activity tolerance  Due to these impairments, patient has difficulty performing the following: ADL's, recreational activities, engaging in social activities, ambulation, stair negotiation, lifting/carrying, transfers, prolonged standing, squatting, kneeling, household chores, yard work, and shopping  Reviewed HEP, DVT/infection prophylaxis, car transfers, stair negotiation, and home setup with pt and family  Patient would benefit from skilled physical therapy services to address the above functional limitations and progress towards prior level of function and independence with home exercise program     Goals  Short Term Goals (3 weeks; target date: 5/15/21)  1  [Goal Met] Patient will be independent with initial HEP  2  [Goal Met] Patient will demonstrate an increase in right knee flexion AROM to 100°  3  [Goal Met] Patient will demonstrate an increase in right knee strength of 1/2 grade on MMT  Long Term Goals (8 weeks; target date: 21)  1   [Goal Met] Patient will be independent with comprehensive HEP  2  [Goal Met] Patient will demonstrate an increase in right knee AROM to WNL in order to promote self-care activities pain-free  3  [Goal Met] Patient will demonstrate an increase in right knee strength to 4/5 on MMT  4  [Goal Met] Patient will be able to perform a full, functional squat with proper mechanics  5  [Goal Met] Patient will be able to ascend/descend 10 stairs reciprocally without use of handrail  6  [Goal Met] Patient will be able to ambulate 300 ft on even and uneven surfaces without AD  Plan: Discharge from PT to comprehensive HEP  Precautions: Hx of CVA; R total knee 4/5/21 20 21      Manuals 6/1/21 6/9/21      STM/MFR        Manual flexibility HS, gastrocs, quads        Joint mobs knee        PROM R knee        Neuro Re-Ed        Quad sets        SLR 3x10 2# 3x10 2#      Clamshells 40x black 30x black      Hip Add w ball        Bridging 35x  30x black      SLS        Lateral step down        Bar squat 2x10 to parallel   2x10      Hurdles         Ther Ex        Nustep 11' L4 12' L4      Heel slides        Gastroc stretch        Heel raises        HS curl         Step up 30x 6" unilateral rail 30x 6"       Lateral step up 30x 6" unilateral rail 30x 6"      Forward step down 20x 6" bilateral rails 30x 6"       Leg press 3x10 75#               Ther Activity        Pt education  Comprehensive HEP      Squats Sit to stand 2x10  2x10      Gait 50 ft x 2 with VC for inc step length Reviewed increased step length      Ladder stepping                        Modalities        CP R knee 5' post 5' post                  Access Code: [email or web address]  URL: [email or web address]  Date: 06/09/2021  Prepared by: Americo Todd    Exercises  Supine Active Straight Leg Raise - 1 x daily - 3-5 x weekly - 3 sets - 10 reps  Hooklying Clamshell with Resistance - 1 x daily - 3-5 x weekly - 1 sets - 30 reps  Supine Bridge - 1 x daily - 3-5 x weekly - 3 sets - 10 reps  Mini Squat with Counter Support - 1 x daily - 3-5 x weekly - 2 sets - 10 reps  Step Up - 1 x daily - 3-5 x weekly - 2 sets - 10 reps  Forward Step Down - 1 x daily - 3-5 x weekly - 2 sets - 10 reps    Instructed to use railings with step up and step down

## 2021-06-10 ENCOUNTER — APPOINTMENT (OUTPATIENT)
Dept: PHYSICAL THERAPY | Facility: CLINIC | Age: 71
End: 2021-06-10
Payer: COMMERCIAL

## 2021-06-14 ENCOUNTER — TRANSCRIBE ORDERS (OUTPATIENT)
Dept: ADMINISTRATIVE | Facility: HOSPITAL | Age: 71
End: 2021-06-14

## 2021-06-14 ENCOUNTER — APPOINTMENT (OUTPATIENT)
Dept: PHYSICAL THERAPY | Facility: CLINIC | Age: 71
End: 2021-06-14
Payer: COMMERCIAL

## 2021-06-14 ENCOUNTER — APPOINTMENT (OUTPATIENT)
Dept: LAB | Facility: HOSPITAL | Age: 71
End: 2021-06-14
Attending: ORTHOPAEDIC SURGERY
Payer: COMMERCIAL

## 2021-06-14 DIAGNOSIS — M25.561 RIGHT KNEE PAIN, UNSPECIFIED CHRONICITY: ICD-10-CM

## 2021-06-14 DIAGNOSIS — G89.29 CHRONIC IDIOPATHIC ANAL PAIN: ICD-10-CM

## 2021-06-14 DIAGNOSIS — K62.89 CHRONIC IDIOPATHIC ANAL PAIN: ICD-10-CM

## 2021-06-14 DIAGNOSIS — M25.562 LEFT KNEE PAIN, UNSPECIFIED CHRONICITY: ICD-10-CM

## 2021-06-14 DIAGNOSIS — M17.11 OSTEOARTHRITIS OF RIGHT KNEE, UNSPECIFIED OSTEOARTHRITIS TYPE: ICD-10-CM

## 2021-06-14 DIAGNOSIS — M17.11 OSTEOARTHRITIS OF RIGHT KNEE, UNSPECIFIED OSTEOARTHRITIS TYPE: Primary | ICD-10-CM

## 2021-06-14 LAB
ALBUMIN SERPL BCP-MCNC: 3.6 G/DL (ref 3.5–5)
ALP SERPL-CCNC: 100 U/L (ref 46–116)
ALT SERPL W P-5'-P-CCNC: 23 U/L (ref 12–78)
ANION GAP SERPL CALCULATED.3IONS-SCNC: 8 MMOL/L (ref 4–13)
APTT PPP: 29 SECONDS (ref 23–37)
AST SERPL W P-5'-P-CCNC: 20 U/L (ref 5–45)
BASOPHILS # BLD AUTO: 0.07 THOUSANDS/ΜL (ref 0–0.1)
BASOPHILS NFR BLD AUTO: 1 % (ref 0–1)
BILIRUB SERPL-MCNC: 0.54 MG/DL (ref 0.2–1)
BUN SERPL-MCNC: 20 MG/DL (ref 5–25)
CALCIUM SERPL-MCNC: 9 MG/DL (ref 8.3–10.1)
CHLORIDE SERPL-SCNC: 100 MMOL/L (ref 100–108)
CO2 SERPL-SCNC: 30 MMOL/L (ref 21–32)
CREAT SERPL-MCNC: 1.06 MG/DL (ref 0.6–1.3)
EOSINOPHIL # BLD AUTO: 0.18 THOUSAND/ΜL (ref 0–0.61)
EOSINOPHIL NFR BLD AUTO: 3 % (ref 0–6)
ERYTHROCYTE [DISTWIDTH] IN BLOOD BY AUTOMATED COUNT: 12.8 % (ref 11.6–15.1)
GFR SERPL CREATININE-BSD FRML MDRD: 69 ML/MIN/1.73SQ M
GLUCOSE P FAST SERPL-MCNC: 102 MG/DL (ref 65–99)
HCT VFR BLD AUTO: 37.2 % (ref 36.5–49.3)
HGB BLD-MCNC: 12 G/DL (ref 12–17)
IMM GRANULOCYTES # BLD AUTO: 0 THOUSAND/UL (ref 0–0.2)
IMM GRANULOCYTES NFR BLD AUTO: 0 % (ref 0–2)
INR PPP: 1.15 (ref 0.84–1.19)
LYMPHOCYTES # BLD AUTO: 1.16 THOUSANDS/ΜL (ref 0.6–4.47)
LYMPHOCYTES NFR BLD AUTO: 22 % (ref 14–44)
MCH RBC QN AUTO: 28.8 PG (ref 26.8–34.3)
MCHC RBC AUTO-ENTMCNC: 32.3 G/DL (ref 31.4–37.4)
MCV RBC AUTO: 89 FL (ref 82–98)
MONOCYTES # BLD AUTO: 0.66 THOUSAND/ΜL (ref 0.17–1.22)
MONOCYTES NFR BLD AUTO: 13 % (ref 4–12)
NEUTROPHILS # BLD AUTO: 3.15 THOUSANDS/ΜL (ref 1.85–7.62)
NEUTS SEG NFR BLD AUTO: 61 % (ref 43–75)
NRBC BLD AUTO-RTO: 0 /100 WBCS
PLATELET # BLD AUTO: 241 THOUSANDS/UL (ref 149–390)
PMV BLD AUTO: 9.1 FL (ref 8.9–12.7)
POTASSIUM SERPL-SCNC: 3.6 MMOL/L (ref 3.5–5.3)
PROT SERPL-MCNC: 7.6 G/DL (ref 6.4–8.2)
PROTHROMBIN TIME: 14.6 SECONDS (ref 11.6–14.5)
RBC # BLD AUTO: 4.17 MILLION/UL (ref 3.88–5.62)
SODIUM SERPL-SCNC: 138 MMOL/L (ref 136–145)
WBC # BLD AUTO: 5.22 THOUSAND/UL (ref 4.31–10.16)

## 2021-06-14 PROCEDURE — 85610 PROTHROMBIN TIME: CPT | Performed by: ORTHOPAEDIC SURGERY

## 2021-06-14 PROCEDURE — 85730 THROMBOPLASTIN TIME PARTIAL: CPT | Performed by: ORTHOPAEDIC SURGERY

## 2021-06-14 PROCEDURE — 87081 CULTURE SCREEN ONLY: CPT

## 2021-06-14 PROCEDURE — 36415 COLL VENOUS BLD VENIPUNCTURE: CPT | Performed by: ORTHOPAEDIC SURGERY

## 2021-06-14 PROCEDURE — 80053 COMPREHEN METABOLIC PANEL: CPT | Performed by: ORTHOPAEDIC SURGERY

## 2021-06-14 PROCEDURE — 85025 COMPLETE CBC W/AUTO DIFF WBC: CPT | Performed by: ORTHOPAEDIC SURGERY

## 2021-06-15 ENCOUNTER — APPOINTMENT (OUTPATIENT)
Dept: PHYSICAL THERAPY | Facility: CLINIC | Age: 71
End: 2021-06-15
Payer: COMMERCIAL

## 2021-06-15 LAB — MRSA NOSE QL CULT: NORMAL

## 2021-06-16 ENCOUNTER — OFFICE VISIT (OUTPATIENT)
Dept: OBGYN CLINIC | Facility: CLINIC | Age: 71
End: 2021-06-16
Payer: COMMERCIAL

## 2021-06-16 ENCOUNTER — APPOINTMENT (OUTPATIENT)
Dept: PHYSICAL THERAPY | Facility: CLINIC | Age: 71
End: 2021-06-16
Payer: COMMERCIAL

## 2021-06-16 ENCOUNTER — TELEPHONE (OUTPATIENT)
Dept: FAMILY MEDICINE CLINIC | Facility: CLINIC | Age: 71
End: 2021-06-16

## 2021-06-16 VITALS
SYSTOLIC BLOOD PRESSURE: 149 MMHG | DIASTOLIC BLOOD PRESSURE: 78 MMHG | HEART RATE: 69 BPM | BODY MASS INDEX: 21.94 KG/M2 | WEIGHT: 171 LBS | HEIGHT: 74 IN

## 2021-06-16 DIAGNOSIS — M17.12 PRIMARY OSTEOARTHRITIS OF LEFT KNEE: Primary | ICD-10-CM

## 2021-06-16 DIAGNOSIS — G89.29 CHRONIC PAIN OF LEFT KNEE: ICD-10-CM

## 2021-06-16 DIAGNOSIS — M25.562 CHRONIC PAIN OF LEFT KNEE: ICD-10-CM

## 2021-06-16 DIAGNOSIS — Z11.59 SCREENING FOR VIRAL DISEASE: ICD-10-CM

## 2021-06-16 DIAGNOSIS — Z96.651 STATUS POST TOTAL KNEE REPLACEMENT USING CEMENT, RIGHT: ICD-10-CM

## 2021-06-16 DIAGNOSIS — Z86.73 HISTORY OF HEMORRHAGIC CEREBROVASCULAR ACCIDENT (CVA) WITHOUT RESIDUAL DEFICITS: ICD-10-CM

## 2021-06-16 DIAGNOSIS — I10 ESSENTIAL HYPERTENSION: ICD-10-CM

## 2021-06-16 DIAGNOSIS — E78.5 HYPERLIPIDEMIA, UNSPECIFIED HYPERLIPIDEMIA TYPE: ICD-10-CM

## 2021-06-16 PROCEDURE — 3078F DIAST BP <80 MM HG: CPT | Performed by: ORTHOPAEDIC SURGERY

## 2021-06-16 PROCEDURE — 1036F TOBACCO NON-USER: CPT | Performed by: ORTHOPAEDIC SURGERY

## 2021-06-16 PROCEDURE — 3077F SYST BP >= 140 MM HG: CPT | Performed by: ORTHOPAEDIC SURGERY

## 2021-06-16 PROCEDURE — 1160F RVW MEDS BY RX/DR IN RCRD: CPT | Performed by: ORTHOPAEDIC SURGERY

## 2021-06-16 PROCEDURE — 99215 OFFICE O/P EST HI 40 MIN: CPT | Performed by: ORTHOPAEDIC SURGERY

## 2021-06-16 PROCEDURE — 3008F BODY MASS INDEX DOCD: CPT | Performed by: ORTHOPAEDIC SURGERY

## 2021-06-16 RX ORDER — FERROUS SULFATE TAB EC 324 MG (65 MG FE EQUIVALENT) 324 (65 FE) MG
324 TABLET DELAYED RESPONSE ORAL
Qty: 30 TABLET | Refills: 0 | Status: SHIPPED | OUTPATIENT
Start: 2021-06-16 | End: 2021-07-12 | Stop reason: SDUPTHER

## 2021-06-16 RX ORDER — CHLORHEXIDINE GLUCONATE 0.12 MG/ML
15 RINSE ORAL ONCE
Status: CANCELLED | OUTPATIENT
Start: 2021-07-13 | End: 2021-06-16

## 2021-06-16 RX ORDER — FOLIC ACID 1 MG/1
1 TABLET ORAL DAILY
Qty: 30 TABLET | Refills: 0 | Status: SHIPPED | OUTPATIENT
Start: 2021-06-16 | End: 2021-07-12 | Stop reason: SDUPTHER

## 2021-06-16 RX ORDER — MELATONIN
1000 DAILY
Qty: 30 TABLET | Refills: 0 | Status: SHIPPED | OUTPATIENT
Start: 2021-06-16 | End: 2021-07-12 | Stop reason: SDUPTHER

## 2021-06-16 RX ORDER — ZINC SULFATE 50(220)MG
220 CAPSULE ORAL DAILY
Qty: 30 CAPSULE | Refills: 0 | Status: SHIPPED | OUTPATIENT
Start: 2021-06-16 | End: 2021-07-12 | Stop reason: SDUPTHER

## 2021-06-16 NOTE — TELEPHONE ENCOUNTER
Tried calling patient to schedule surgical clearance appt  Call went right to voicemail  Left msg  Surgical clearance- 7/13 knee replacement at [facility] by Dr Mason Garces- labs, xrays & EKG already ordered

## 2021-06-17 ENCOUNTER — APPOINTMENT (OUTPATIENT)
Dept: PHYSICAL THERAPY | Facility: CLINIC | Age: 71
End: 2021-06-17
Payer: COMMERCIAL

## 2021-06-17 NOTE — PROGRESS NOTES
Assessment/Plan:  1  Primary osteoarthritis of left knee  cholecalciferol (VITAMIN D3) 1,000 units tablet    ferrous sulfate 324 (65 Fe) mg    folic acid (FOLVITE) 1 mg tablet    zinc sulfate (ZINCATE) 220 mg capsule    ascorbic Acid (VITAMIN C) 500 MG CPCR    Case request operating room: ARTHROPLASTY KNEE TOTAL    PAT Covid Screening    [place] [facility]    Ambulatory referral to Physical Therapy    Case request operating room: ARTHROPLASTY KNEE TOTAL   2  Chronic pain of left knee  cholecalciferol (VITAMIN D3) 1,000 units tablet    ferrous sulfate 324 (65 Fe) mg    folic acid (FOLVITE) 1 mg tablet    zinc sulfate (ZINCATE) 220 mg capsule    ascorbic Acid (VITAMIN C) 500 MG CPCR    Case request operating room: ARTHROPLASTY KNEE TOTAL    PAT Covid Screening    Ambulatory referral to Family Practice    Ambulatory referral to Physical Therapy    Case request operating room: ARTHROPLASTY KNEE TOTAL   3  Essential hypertension  Ambulatory referral to [facility]   4  History of hemorrhagic cerebrovascular accident (CVA) without residual deficits  Ambulatory referral to [facility]   5  Status post total knee replacement using cement, right  Ambulatory referral to [facility]   6  Hyperlipidemia, unspecified hyperlipidemia type  Ambulatory referral to [facility]   7  Screening for viral disease  PAT Covid Screening    Ambulatory referral to [address] [place] [address] is a very pleasant 77-year-old gentleman seen today with his daughter serving as our  presenting for follow-up of his activity related left knee pain due to his severe end-stage underlying osteoarthritis  he has recovered very well from the right total knee arthroplasty was performed 10 weeks ago, and his recent lab work demonstrates that he physiologically has returned to his baseline  With this in mind, he would like to pursue left total knee arthroplasty    Risks of surgery including but not limited to bleeding, infection, stiffness, need for further surgery, failure, persistent limp, leg length discrepancy, damage to nerves or vessels, blood clots, heart attack, stroke, and death were discussed  Consents were signed and placed into the chart for a left total knee arthroplasty  He would like to pursue this on the same day as his wife, and they would both like after July 4th due to an obligation to attend a wedding  He has already completed his preoperative lab work and will just need clearance from his primary care provider  He will again be a candidate for aspirin for DVT prophylaxis and outpatient physical therapy postoperatively  He denies history of MRSA infection, malignancy, DVT / PE, gastric ulcer GI bleed, smoking, or use of tumeric  He was introduced today to our surgical scheduler for further surgical planning  All of his and his daughter's questions were addressed today    Subjective:  Left knee follow-up    Patient ID: Zachary Tobias is a 66 y o  male  Patient is a very pleasant 75-year-old gentleman well known to our practice for his activity related left knee pain due to his severe end-stage underlying osteoarthritis  He is now 10 weeks status post a right total knee arthroplasty, but she has recovered very well  He continues to have activity related pain in the left knee on a daily basis  Did not have any major medical events after the right total knee arthroplasty and he is now interested in pursuing surgery on the contralateral side  Review of Systems   Constitutional: Positive for activity change  HENT: Negative  Eyes: Negative  Respiratory: Negative  Cardiovascular: Negative  Gastrointestinal: Negative  Endocrine: Negative  Genitourinary: Negative  Musculoskeletal: Positive for arthralgias, gait problem, joint swelling and myalgias  Skin: Negative  Allergic/Immunologic: Negative  Hematological: Negative  Psychiatric/Behavioral: Negative  Past Medical History:   Diagnosis Date    Anxiety     Arthritis     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 [address] [facility]     Hyperlipidemia     Hypertension     Stroke ([facility] [address] ) 2005       Past Surgical History:   Procedure Laterality Date    HERNIA REPAIR      ME TOTAL KNEE ARTHROPLASTY Right 4/5/2021    Procedure: ARTHROPLASTY KNEE TOTAL;  Surgeon: Hanna Saenz DO;  Location: [facility];  Service: Orthopedics       Family History   Problem Relation Age of Onset    Stroke Mother     Stroke Father        Social History     Occupational History    Not on file   Tobacco Use    Smoking status: Never Smoker    Smokeless tobacco: Never Used   Vaping Use    Vaping Use: Never used   Substance and Sexual Activity    Alcohol use: Never    Drug use: Never    Sexual activity: Not Currently         Current Outpatient Medications:     amLODIPine (NORVASC) 5 mg tablet, Take 1 tablet (5 mg total) by mouth 2 (two) times a day, Disp: 180 tablet, Rfl: 3    ascorbic Acid (VITAMIN C) 500 MG CPCR, Take 1 capsule (500 mg total) by mouth 2 (two) times a day, Disp: 60 capsule, Rfl: 0    atorvastatin (LIPITOR) 10 mg tablet, Take 1 tablet (10 mg total) by mouth daily with dinner, Disp: 90 tablet, Rfl: 3    cholecalciferol (VITAMIN D3) 1,000 units tablet, Take 1 tablet (1,000 Units total) by mouth daily, Disp: 30 tablet, Rfl: 0    escitalopram (LEXAPRO) 5 mg tablet, Take 1 tablet (5 mg total) by mouth daily at bedtime, Disp: 90 tablet, Rfl: 3    ferrous sulfate 324 (65 Fe) mg, Take 1 tablet (324 mg total) by mouth daily before breakfast, Disp: 30 tablet, Rfl: 0    folic acid (FOLVITE) 1 mg tablet, Take 1 tablet (1 mg total) by mouth daily, Disp: 30 tablet, Rfl: 0    labetalol (NORMODYNE) 200 mg tablet, Take 1 tablet (200 mg total) by mouth every 12 (twelve) hours, Disp: 90 tablet, Rfl: 3    losartan (COZAAR) 50 mg tablet, Take 1 tablet (50 mg total) by mouth 2 (two) times a day, Disp: 90 tablet, Rfl: 3    zinc sulfate (ZINCATE) 220 mg capsule, Take 1 capsule (220 mg total) by mouth daily, Disp: 30 capsule, Rfl: 0    No Known Allergies    Objective:  Vitals:    06/16/21 1136   BP: 149/78   Pulse: 69       Body mass index is 21 96 kg/m²  Right Knee Exam     Muscle Strength   The patient has normal right knee strength  Tenderness   The patient is experiencing no tenderness  Range of Motion   Extension:  0 normal   Flexion:  130 normal     Tests   Varus: negative Valgus: negative  Lachman:  Anterior - negative      Drawer:  Anterior - negative      Patellar apprehension: negative    Other   Erythema: absent  Scars: present  Sensation: normal  Pulse: present  Swelling: none  Effusion: no effusion present      Left Knee Exam     Muscle Strength   The patient has normal left knee strength  Tenderness   The patient is experiencing tenderness in the medial joint line and patella  Range of Motion   Extension:  0 normal   Flexion:  130 normal     Tests   Varus: negative Valgus: negative  Lachman:  Anterior - negative      Drawer:  Anterior - negative       Patellar apprehension: negative    Other   Erythema: absent  Scars: absent  Sensation: normal  Pulse: present  Swelling: none  Effusion: effusion (Popliteal) present    Comments:   Thigh and calf soft nontender  10 degree varus deformity correctable to neurtral     Crepitance on passive range of motion   neurovascular intact          Observations   Left Knee   Positive for effusion (Popliteal)  Right Knee   Negative for effusion  Physical Exam  Vitals and nursing note reviewed  Constitutional:       Appearance: Normal appearance  He is well-developed  Comments: Body mass index is 21 96 kg/m²  HENT:      Head: Normocephalic and atraumatic        Right Ear: External ear normal       Left Ear: External ear normal       Nose: Nose normal       Mouth/Throat:      Mouth: Mucous membranes are moist    Eyes:      Extraocular Movements: Extraocular movements intact  Cardiovascular:      Rate and Rhythm: Normal rate  Pulses: Normal pulses  Pulmonary:      Effort: Pulmonary effort is normal    Abdominal:      Palpations: Abdomen is soft  Musculoskeletal:      Cervical back: Normal range of motion  Right knee: No effusion  Left knee: Effusion (Popliteal) present  Comments: See ortho exam   Skin:     General: Skin is warm and dry  Neurological:      General: No focal deficit present  Mental Status: He is alert and oriented to person, place, and time  Psychiatric:         Mood and Affect: Mood normal          Behavior: Behavior normal          Thought Content: Thought content normal          Judgment: Judgment normal          I have personally reviewed pertinent films in PACS of the previously taken weight-bearing Mag marker x-rays of his left knee which demonstrate severe end-stage degenerative changes with slight valgus deformity  He has significant tricompartmental joint space loss, sclerosis and osteophytosis    There is no evidence of fracture, dislocation, or lytic blastic lesion      Lab Results   Component Value Date    WBC 5 22 06/14/2021    HGB 12 0 06/14/2021    HCT 37 2 06/14/2021    MCV 89 06/14/2021     06/14/2021     Lab Results   Component Value Date    SODIUM 138 06/14/2021    K 3 6 06/14/2021     06/14/2021    CO2 30 06/14/2021    AGAP 8 06/14/2021    BUN 20 06/14/2021    CREATININE 1 06 06/14/2021    GLUC 116 04/06/2021    GLUF 102 (H) 06/14/2021    CALCIUM 9 0 06/14/2021    AST 20 06/14/2021    ALT 23 06/14/2021    ALKPHOS 100 06/14/2021    TP 7 6 06/14/2021    TBILI 0 54 06/14/2021    EGFR 69 06/14/2021 denies loose teeth, has crowns

## 2021-06-17 NOTE — H&P (VIEW-ONLY)
Assessment/Plan:  1  Primary osteoarthritis of left knee  cholecalciferol (VITAMIN D3) 1,000 units tablet    ferrous sulfate 324 (65 Fe) mg    folic acid (FOLVITE) 1 mg tablet    zinc sulfate (ZINCATE) 220 mg capsule    ascorbic Acid (VITAMIN C) 500 MG CPCR    Case request operating room: ARTHROPLASTY KNEE TOTAL    PAT Covid Screening    Ambulatory referral to [facility]    Ambulatory referral to Physical Therapy    Case request operating room: ARTHROPLASTY KNEE TOTAL   2  Chronic pain of left knee  cholecalciferol (VITAMIN D3) 1,000 units tablet    ferrous sulfate 324 (65 Fe) mg    folic acid (FOLVITE) 1 mg tablet    zinc sulfate (ZINCATE) 220 mg capsule    ascorbic Acid (VITAMIN C) 500 MG CPCR    Case request operating room: ARTHROPLASTY KNEE TOTAL    PAT Covid Screening    Ambulatory referral to Family Practice    Ambulatory referral to Physical Therapy    Case request operating room: ARTHROPLASTY KNEE TOTAL   3  Essential hypertension  Ambulatory referral to [facility]   4  History of hemorrhagic cerebrovascular accident (CVA) without residual deficits  Ambulatory referral to [facility]   5  Status post total knee replacement using cement, right  Ambulatory referral to [facility]   6  Hyperlipidemia, unspecified hyperlipidemia type  Ambulatory referral to [facility]   7  Screening for viral disease  PAT Covid Screening    Ambulatory referral to [address] [place] [address] is a very pleasant 25-year-old gentleman seen today with his daughter serving as our  presenting for follow-up of his activity related left knee pain due to his severe end-stage underlying osteoarthritis  he has recovered very well from the right total knee arthroplasty was performed 10 weeks ago, and his recent lab work demonstrates that he physiologically has returned to his baseline  With this in mind, he would like to pursue left total knee arthroplasty    Risks of surgery including but not limited to bleeding, infection, stiffness, need for further surgery, failure, persistent limp, leg length discrepancy, damage to nerves or vessels, blood clots, heart attack, stroke, and death were discussed  Consents were signed and placed into the chart for a left total knee arthroplasty  He would like to pursue this on the same day as his wife, and they would both like after July 4th due to an obligation to attend a wedding  He has already completed his preoperative lab work and will just need clearance from his primary care provider  He will again be a candidate for aspirin for DVT prophylaxis and outpatient physical therapy postoperatively  He denies history of MRSA infection, malignancy, DVT / PE, gastric ulcer GI bleed, smoking, or use of tumeric  He was introduced today to our surgical scheduler for further surgical planning  All of his and his daughter's questions were addressed today    Subjective:  Left knee follow-up    Patient ID: Reema Henderson is a 66 y o  male  Patient is a very pleasant 75-year-old gentleman well known to our practice for his activity related left knee pain due to his severe end-stage underlying osteoarthritis  He is now 10 weeks status post a right total knee arthroplasty, but she has recovered very well  He continues to have activity related pain in the left knee on a daily basis  Did not have any major medical events after the right total knee arthroplasty and he is now interested in pursuing surgery on the contralateral side  Review of Systems   Constitutional: Positive for activity change  HENT: Negative  Eyes: Negative  Respiratory: Negative  Cardiovascular: Negative  Gastrointestinal: Negative  Endocrine: Negative  Genitourinary: Negative  Musculoskeletal: Positive for arthralgias, gait problem, joint swelling and myalgias  Skin: Negative  Allergic/Immunologic: Negative  Hematological: Negative  Psychiatric/Behavioral: Negative  Past Medical History:   Diagnosis Date    Anxiety     Arthritis     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 [facility] [address] [facility]     Hyperlipidemia     Hypertension     Stroke ([facility] [address] ) 2005       Past Surgical History:   Procedure Laterality Date    HERNIA REPAIR      [place] TOTAL KNEE ARTHROPLASTY Right 4/5/2021    Procedure: ARTHROPLASTY KNEE TOTAL;  Surgeon: Francisco Powell DO;  Location: [facility];  Service: Orthopedics       Family History   Problem Relation Age of Onset    Stroke Mother     Stroke Father        Social History     Occupational History    Not on file   Tobacco Use    Smoking status: Never Smoker    Smokeless tobacco: Never Used   Vaping Use    Vaping Use: Never used   Substance and Sexual Activity    Alcohol use: Never    Drug use: Never    Sexual activity: Not Currently         Current Outpatient Medications:     amLODIPine (NORVASC) 5 mg tablet, Take 1 tablet (5 mg total) by mouth 2 (two) times a day, Disp: 180 tablet, Rfl: 3    ascorbic Acid (VITAMIN C) 500 MG CPCR, Take 1 capsule (500 mg total) by mouth 2 (two) times a day, Disp: 60 capsule, Rfl: 0    atorvastatin (LIPITOR) 10 mg tablet, Take 1 tablet (10 mg total) by mouth daily with dinner, Disp: 90 tablet, Rfl: 3    cholecalciferol (VITAMIN D3) 1,000 units tablet, Take 1 tablet (1,000 Units total) by mouth daily, Disp: 30 tablet, Rfl: 0    escitalopram (LEXAPRO) 5 mg tablet, Take 1 tablet (5 mg total) by mouth daily at bedtime, Disp: 90 tablet, Rfl: 3    ferrous sulfate 324 (65 Fe) mg, Take 1 tablet (324 mg total) by mouth daily before breakfast, Disp: 30 tablet, Rfl: 0    folic acid (FOLVITE) 1 mg tablet, Take 1 tablet (1 mg total) by mouth daily, Disp: 30 tablet, Rfl: 0    labetalol (NORMODYNE) 200 mg tablet, Take 1 tablet (200 mg total) by mouth every 12 (twelve) hours, Disp: 90 tablet, Rfl: 3    losartan (COZAAR) 50 mg tablet, Take 1 tablet (50 mg total) by mouth 2 (two) times a day, Disp: 90 tablet, Rfl: 3    zinc sulfate (ZINCATE) 220 mg capsule, Take 1 capsule (220 mg total) by mouth daily, Disp: 30 capsule, Rfl: 0    No Known Allergies    Objective:  Vitals:    06/16/21 1136   BP: 149/78   Pulse: 69       Body mass index is 21 96 kg/m²  Right Knee Exam     Muscle Strength   The patient has normal right knee strength  Tenderness   The patient is experiencing no tenderness  Range of Motion   Extension:  0 normal   Flexion:  130 normal     Tests   Varus: negative Valgus: negative  Lachman:  Anterior - negative      Drawer:  Anterior - negative      Patellar apprehension: negative    Other   Erythema: absent  Scars: present  Sensation: normal  Pulse: present  Swelling: none  Effusion: no effusion present      Left Knee Exam     Muscle Strength   The patient has normal left knee strength  Tenderness   The patient is experiencing tenderness in the medial joint line and patella  Range of Motion   Extension:  0 normal   Flexion:  130 normal     Tests   Varus: negative Valgus: negative  Lachman:  Anterior - negative      Drawer:  Anterior - negative       Patellar apprehension: negative    Other   Erythema: absent  Scars: absent  Sensation: normal  Pulse: present  Swelling: none  Effusion: effusion (Popliteal) present    Comments:   Thigh and calf soft nontender  10 degree varus deformity correctable to neurtral     Crepitance on passive range of motion   neurovascular intact          Observations   Left Knee   Positive for effusion (Popliteal)  Right Knee   Negative for effusion  Physical Exam  Vitals and nursing note reviewed  Constitutional:       Appearance: Normal appearance  He is well-developed  Comments: Body mass index is 21 96 kg/m²  HENT:      Head: Normocephalic and atraumatic        Right Ear: External ear normal       Left Ear: External ear normal       Nose: Nose normal       Mouth/Throat:      Mouth: Mucous membranes are moist    Eyes:      Extraocular Movements: Extraocular movements intact  Cardiovascular:      Rate and Rhythm: Normal rate  Pulses: Normal pulses  Pulmonary:      Effort: Pulmonary effort is normal    Abdominal:      Palpations: Abdomen is soft  Musculoskeletal:      Cervical back: Normal range of motion  Right knee: No effusion  Left knee: Effusion (Popliteal) present  Comments: See ortho exam   Skin:     General: Skin is warm and dry  Neurological:      General: No focal deficit present  Mental Status: He is alert and oriented to person, place, and time  Psychiatric:         Mood and Affect: Mood normal          Behavior: Behavior normal          Thought Content: Thought content normal          Judgment: Judgment normal          I have personally reviewed pertinent films in PACS of the previously taken weight-bearing Mag marker x-rays of his left knee which demonstrate severe end-stage degenerative changes with slight valgus deformity  He has significant tricompartmental joint space loss, sclerosis and osteophytosis    There is no evidence of fracture, dislocation, or lytic blastic lesion      Lab Results   Component Value Date    WBC 5 22 06/14/2021    HGB 12 0 06/14/2021    HCT 37 2 06/14/2021    MCV 89 06/14/2021     06/14/2021     Lab Results   Component Value Date    SODIUM 138 06/14/2021    K 3 6 06/14/2021     06/14/2021    CO2 30 06/14/2021    AGAP 8 06/14/2021    BUN 20 06/14/2021    CREATININE 1 06 06/14/2021    GLUC 116 04/06/2021    GLUF 102 (H) 06/14/2021    CALCIUM 9 0 06/14/2021    AST 20 06/14/2021    ALT 23 06/14/2021    ALKPHOS 100 06/14/2021    TP 7 6 06/14/2021    TBILI 0 54 06/14/2021    EGFR 69 06/14/2021

## 2021-06-22 ENCOUNTER — APPOINTMENT (OUTPATIENT)
Dept: PHYSICAL THERAPY | Facility: CLINIC | Age: 71
End: 2021-06-22
Payer: COMMERCIAL

## 2021-06-24 ENCOUNTER — APPOINTMENT (OUTPATIENT)
Dept: PHYSICAL THERAPY | Facility: CLINIC | Age: 71
End: 2021-06-24
Payer: COMMERCIAL

## 2021-06-29 ENCOUNTER — APPOINTMENT (OUTPATIENT)
Dept: PHYSICAL THERAPY | Facility: CLINIC | Age: 71
End: 2021-06-29
Payer: COMMERCIAL

## 2021-06-29 ENCOUNTER — TELEPHONE (OUTPATIENT)
Dept: OBGYN CLINIC | Facility: HOSPITAL | Age: 71
End: 2021-06-29

## 2021-06-29 NOTE — TELEPHONE ENCOUNTER
Below assessment confirmed with Daughter, completed previously     Preoperative Elective Admission Assessment- Assigned to care team  S/W Mariah Kam, daughter  Daughter lives with her father and mother in 3 level home  Other extended family also resides in same residence  Also of note, pt's wife having surgery same day as pt with same surgeon      Living Situation:  Pt and pt's wife live in a multiple level home with their family, their bedroom/bathroom is in basement      Steps:  #2 steps to enter, they have to sleep in basement level, #5 steps, landing, #5 more steps down to basement with handrail on 1 side  Pt resides now in basement level      First Floor Setup: No sleeping arrangement, so pt will go down into basement level      DME: They have a shower chair, RW and cane  She denies needing a BSC      Patient's Current Level of Function: independent with ambulation and ADLs     Post-op Caregiver:  Mariah Chopra     Post-op Transport:   Mariah Chopra     Outpatient Physical Therapy Site: [facility]     Medication Management: self using pillbox but daughter oversees management                      Preferred Pharmacy for Post-op Medications[facility]                      Blood Management Vitamin Regimen: They confirm pt is taking  Denies questions or issues                     Post-op anticoagulant: TBD by surgeon's team     Discharge Plan: pt plans for DC to home and plans to attend OP PT     Barriers to DC identified preoperatively:   *Many steps living level     BMI: 22 44     Caresense: Declined      Patient Education: educated that our goal, if at all possible, is to appropriately discharge patient based off their post-op function while striving to maintain maximal independence  If possible, the goal is to discharge patient to home and for them to attend outpatient physical therapy  I educated patient on the many benefits of outpt PT(Including maintaining independence, additional resources at [facility] site, better outcomes etc  )  Also educated on how home PT vs  outpt PT is determined (while inpt)

## 2021-07-01 ENCOUNTER — APPOINTMENT (OUTPATIENT)
Dept: PHYSICAL THERAPY | Facility: CLINIC | Age: 71
End: 2021-07-01
Payer: COMMERCIAL

## 2021-07-01 NOTE — PRE-PROCEDURE INSTRUCTIONS
My Surgical Experience    The following information was developed to assist you to prepare for your operation  What do I need to do before coming to the hospital?   Arrange for a responsible person to drive you to and from the hospital    Arrange care for your children at home  Children are not allowed in the recovery areas of the hospital   Plan to wear clothing that is easy to put on and take off  If you are having shoulder surgery, wear a shirt that buttons or zippers in the front  Bathing  o Shower the evening before and the morning of your surgery with an antibacterial soap  Please refer to the Pre Op Showering Instructions for Surgery Patients Sheet   o Remove nail polish and all body piercing jewelry  o Do not shave any body part for at least 24 hours before surgery-this includes face, arms, legs and upper body  Food  o Nothing to eat or drink after midnight the night before your surgery  This includes candy and chewing gum  o Exception: If your surgery is after 12:00pm (noon), you may have clear liquids such as 7-Up®, ginger ale, apple or cranberry juice, Jell-O®, water, or clear broth until 8:00 am  o Do not drink milk or juice with pulp on the morning before surgery  o Do not drink alcohol 24 hours before surgery  Medicine  o Follow instructions you received from your surgeon about which medicines you may take on the day of surgery  o If instructed to take medicine on the morning of surgery, take pills with just a small sip of water  Call your prescribing doctor for specific infroamtion on what to do if you take insulin    What should I bring to the hospital?    Bring:  Valerie Marti or a walker, if you have them, for foot or knee surgery   A list of the daily medicines, vitamins, minerals, herbals and nutritional supplements you take   Include the dosages of medicines and the time you take them each day   Glasses, dentures or hearing aids   Minimal clothing; you will be wearing hospital sleepwear   Photo ID; required to verify your identity   If you have a Living Will or Power of , bring a copy of the documents   If you have an ostomy, bring an extra pouch and any supplies you use    Do not bring   Medicines or inhalers   Money, valuables or jewelry    What other information should I know about the day of surgery?  Notify your surgeons if you develop a cold, sore throat, cough, fever, rash or any other illness   Report to the Ambulatory Surgical/Same Day Surgery Unit   You will be instructed to stop at Registration only if you have not been pre-registered   Inform your  fi they do not stay that they will be asked by the staff to leave a phone number where they can be reached   Be available to be reached before surgery  In the event the operating room schedule changes, you may be asked to come in earlier or later than expected    *It is important to tell your doctor and others involved in your health care if you are taking or have been taking any non-prescription drugs, vitamins, minerals, herbals or other nutritional supplements  Any of these may interact with some food or medicines and cause a reaction      Pre-Surgery Instructions:   Medication Instructions    amLODIPine (NORVASC) 5 mg tablet Instructed patient per Anesthesia Guidelines   ascorbic Acid (VITAMIN C) 500 MG CPCR Instructed patient per Anesthesia Guidelines   atorvastatin (LIPITOR) 10 mg tablet Instructed patient per Anesthesia Guidelines   cholecalciferol (VITAMIN D3) 1,000 units tablet Instructed patient per Anesthesia Guidelines   escitalopram (LEXAPRO) 5 mg tablet Instructed patient per Anesthesia Guidelines   ferrous sulfate 324 (65 Fe) mg Instructed patient per Anesthesia Guidelines   folic acid (FOLVITE) 1 mg tablet Instructed patient per Anesthesia Guidelines   labetalol (NORMODYNE) 200 mg tablet Instructed patient per Anesthesia Guidelines      losartan (COZAAR) 50 mg tablet Instructed patient per Anesthesia Guidelines   zinc sulfate (ZINCATE) 220 mg capsule Instructed patient per Anesthesia Guidelines  to  Take amlodipine and  labetolol a m  of surgery

## 2021-07-12 ENCOUNTER — ANESTHESIA EVENT (OUTPATIENT)
Dept: PERIOP | Facility: HOSPITAL | Age: 71
DRG: 544 | End: 2021-07-12
Payer: COMMERCIAL

## 2021-07-12 ENCOUNTER — OFFICE VISIT (OUTPATIENT)
Dept: FAMILY MEDICINE CLINIC | Facility: CLINIC | Age: 71
End: 2021-07-12
Payer: COMMERCIAL

## 2021-07-12 VITALS
TEMPERATURE: 98.1 F | DIASTOLIC BLOOD PRESSURE: 68 MMHG | HEART RATE: 72 BPM | HEIGHT: 74 IN | SYSTOLIC BLOOD PRESSURE: 122 MMHG | WEIGHT: 170.9 LBS | BODY MASS INDEX: 21.93 KG/M2 | OXYGEN SATURATION: 98 % | RESPIRATION RATE: 18 BRPM

## 2021-07-12 DIAGNOSIS — F32.4 MAJOR DEPRESSIVE DISORDER IN PARTIAL REMISSION, UNSPECIFIED WHETHER RECURRENT (HCC): ICD-10-CM

## 2021-07-12 DIAGNOSIS — M17.12 PRIMARY OSTEOARTHRITIS OF LEFT KNEE: ICD-10-CM

## 2021-07-12 DIAGNOSIS — Z01.818 PREOPERATIVE CLEARANCE: Primary | ICD-10-CM

## 2021-07-12 DIAGNOSIS — Z86.73 HISTORY OF HEMORRHAGIC CEREBROVASCULAR ACCIDENT (CVA) WITHOUT RESIDUAL DEFICITS: ICD-10-CM

## 2021-07-12 DIAGNOSIS — G89.29 CHRONIC PAIN OF LEFT KNEE: ICD-10-CM

## 2021-07-12 DIAGNOSIS — I10 ESSENTIAL HYPERTENSION: ICD-10-CM

## 2021-07-12 DIAGNOSIS — M25.562 CHRONIC PAIN OF LEFT KNEE: ICD-10-CM

## 2021-07-12 PROCEDURE — 1160F RVW MEDS BY RX/DR IN RCRD: CPT | Performed by: FAMILY MEDICINE

## 2021-07-12 PROCEDURE — 3074F SYST BP LT 130 MM HG: CPT | Performed by: FAMILY MEDICINE

## 2021-07-12 PROCEDURE — 3008F BODY MASS INDEX DOCD: CPT | Performed by: FAMILY MEDICINE

## 2021-07-12 PROCEDURE — 3078F DIAST BP <80 MM HG: CPT | Performed by: FAMILY MEDICINE

## 2021-07-12 PROCEDURE — 1036F TOBACCO NON-USER: CPT | Performed by: FAMILY MEDICINE

## 2021-07-12 PROCEDURE — 99213 OFFICE O/P EST LOW 20 MIN: CPT | Performed by: FAMILY MEDICINE

## 2021-07-12 RX ORDER — LABETALOL 200 MG/1
200 TABLET, FILM COATED ORAL EVERY 12 HOURS SCHEDULED
Qty: 90 TABLET | Refills: 3 | Status: SHIPPED | OUTPATIENT
Start: 2021-07-12 | End: 2021-08-13 | Stop reason: SDUPTHER

## 2021-07-12 RX ORDER — FERROUS SULFATE TAB EC 324 MG (65 MG FE EQUIVALENT) 324 (65 FE) MG
324 TABLET DELAYED RESPONSE ORAL
Qty: 30 TABLET | Refills: 0 | Status: ON HOLD | OUTPATIENT
Start: 2021-07-12 | End: 2021-07-13

## 2021-07-12 RX ORDER — AMLODIPINE BESYLATE 5 MG/1
5 TABLET ORAL 2 TIMES DAILY
Qty: 180 TABLET | Refills: 3 | Status: SHIPPED | OUTPATIENT
Start: 2021-07-12 | End: 2021-08-13 | Stop reason: SDUPTHER

## 2021-07-12 RX ORDER — LOSARTAN POTASSIUM 50 MG/1
50 TABLET ORAL 2 TIMES DAILY
Qty: 90 TABLET | Refills: 3 | Status: SHIPPED | OUTPATIENT
Start: 2021-07-12 | End: 2021-08-13 | Stop reason: SDUPTHER

## 2021-07-12 RX ORDER — FOLIC ACID 1 MG/1
1 TABLET ORAL DAILY
Qty: 30 TABLET | Refills: 0 | Status: ON HOLD | OUTPATIENT
Start: 2021-07-12 | End: 2021-07-13

## 2021-07-12 RX ORDER — ATORVASTATIN CALCIUM 10 MG/1
10 TABLET, FILM COATED ORAL
Qty: 90 TABLET | Refills: 3 | Status: SHIPPED | OUTPATIENT
Start: 2021-07-12 | End: 2021-08-13 | Stop reason: SDUPTHER

## 2021-07-12 RX ORDER — ESCITALOPRAM OXALATE 5 MG/1
5 TABLET ORAL
Qty: 90 TABLET | Refills: 3 | Status: SHIPPED | OUTPATIENT
Start: 2021-07-12 | End: 2021-08-13 | Stop reason: SDUPTHER

## 2021-07-12 RX ORDER — ZINC SULFATE 50(220)MG
220 CAPSULE ORAL DAILY
Qty: 30 CAPSULE | Refills: 0 | Status: ON HOLD | OUTPATIENT
Start: 2021-07-12 | End: 2021-07-13

## 2021-07-12 RX ORDER — MELATONIN
1000 DAILY
Qty: 30 TABLET | Refills: 0 | Status: ON HOLD | OUTPATIENT
Start: 2021-07-12 | End: 2021-07-13

## 2021-07-12 NOTE — ANESTHESIA PREPROCEDURE EVALUATION
Procedure:  ARTHROPLASTY KNEE TOTAL (Left Knee)    Relevant Problems   CARDIO   (+) Essential hypertension   (+) Hyperlipidemia   (+) Hypertensive urgency      HEMATOLOGY   (+) Anemia      NEURO/PSYCH   (+) Anxiety   (+) History of hemorrhagic cerebrovascular accident (CVA) without residual deficits   (+) History of nocturia        Physical Exam    Airway    Mallampati score: II  TM Distance: >3 FB  Neck ROM: full     Dental   No notable dental hx     Cardiovascular  Rhythm: regular, Rate: normal, Cardiovascular exam normal    Pulmonary  Pulmonary exam normal Breath sounds clear to auscultation,     Other Findings        Anesthesia Plan  ASA Score- 3     Anesthesia Type- spinal, regional and IV sedation with anesthesia with ASA Monitors  Additional Monitors:   Airway Plan:           Plan Factors-Exercise tolerance (METS): >4 METS  Existing labs reviewed  Patient summary reviewed  Patient is not a current smoker  Induction- intravenous  Postoperative Plan- Plan for postoperative opioid use  Informed Consent- Anesthetic plan and risks discussed with patient  I personally reviewed this patient with the CRNA  Discussed and agreed on the Anesthesia Plan with the CRNA  Sherre Soulier

## 2021-07-12 NOTE — PROGRESS NOTES
Presurgical Evaluation    Subjective:      Patient ID: Luis Hallman is a 66 y o  male   With past medical history of essential hypertension, history of CVA, dyslipidemia who is being seen today for preoperative  Medical optimization  Chief Complaint   Patient presents with    Pre-op Exam         The following portions of the patient's history were reviewed and updated as appropriate: allergies, current medications, past family history, past medical history, past social history, past surgical history and problem list     Procedure date: 7/13/21    Surgeon:  Dr Bárbara Lai  Planned procedure:  ARTHROPLASTY KNEE TOTAL (Left Knee)  Diagnosis for procedure:  Primary osteoarthritis of left knee [M17 12]       Chronic pain of left knee [M25 562, G89 29]    Prior anesthesia: Yes   General; Complications:  None / Tolerated well    CAD History: None   NOTE: Patient should see Cardiology if time available before surgery, and if appropriate      Pulmonary History: None    Renal history: None    Diabetes History:  None     Neurological History: CVA     On Immunosuppressant meds/biologics: No      Review of Systems      Current Outpatient Medications   Medication Sig Dispense Refill    amLODIPine (NORVASC) 5 mg tablet Take 1 tablet (5 mg total) by mouth 2 (two) times a day 180 tablet 3    ascorbic Acid (VITAMIN C) 500 MG CPCR Take 1 capsule (500 mg total) by mouth 2 (two) times a day 60 capsule 0    atorvastatin (LIPITOR) 10 mg tablet Take 1 tablet (10 mg total) by mouth daily with dinner 90 tablet 3    cholecalciferol (VITAMIN D3) 1,000 units tablet Take 1 tablet (1,000 Units total) by mouth daily 30 tablet 0    escitalopram (LEXAPRO) 5 mg tablet Take 1 tablet (5 mg total) by mouth daily at bedtime 90 tablet 3    ferrous sulfate 324 (65 Fe) mg Take 1 tablet (324 mg total) by mouth daily before breakfast 30 tablet 0    folic acid (FOLVITE) 1 mg tablet Take 1 tablet (1 mg total) by mouth daily 30 tablet 0    labetalol (NORMODYNE) 200 mg tablet Take 1 tablet (200 mg total) by mouth every 12 (twelve) hours 90 tablet 3    losartan (COZAAR) 50 mg tablet Take 1 tablet (50 mg total) by mouth 2 (two) times a day 90 tablet 3    zinc sulfate (ZINCATE) 220 mg capsule Take 1 capsule (220 mg total) by mouth daily 30 capsule 0     No current facility-administered medications for this visit  Allergies on file:   Patient has no known allergies      Patient Active Problem List   Diagnosis    Abdominal pain    Obstipation    Hypertensive urgency    History of hemorrhagic cerebrovascular accident (CVA) without residual deficits    Status post right inguinal hernia repair    Systemic inflammatory response syndrome (HCC)    Abnormal CT of the abdomen    Change in bowel movement    Anemia    History of nocturia    Status post total knee replacement using cement, right    Essential hypertension    Anxiety    Hyperlipidemia    Preoperative clearance        Past Medical History:   Diagnosis Date    Anxiety     Arthritis     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 [facility] [address] [facility]     Hyperlipidemia     Hypertension     Stroke ([facility] [address] ) 2005       Past Surgical History:   Procedure Laterality Date    HERNIA REPAIR      OR TOTAL KNEE ARTHROPLASTY Right 4/5/2021    Procedure: ARTHROPLASTY KNEE TOTAL;  Surgeon: Priscila Sun DO;  [facility];  Service: Orthopedics       Family History   Problem Relation Age of Onset    Stroke Mother     Stroke Father        Social History     Tobacco Use    Smoking status: Never Smoker    Smokeless tobacco: Never Used   Vaping Use    Vaping Use: Never used   Substance Use Topics    Alcohol use: Never    Drug use: Never       Objective:    Vitals:    07/12/21 0946   BP: 122/68   BP Location: Left arm   Patient Position: Sitting   Cuff Size: Standard   Pulse: 72   Resp: 18   Temp: 98 1 °F (36 7 °C)   TempSrc: Tympanic   SpO2: 98%   Weight: 77 5 kg (170 lb 14 4 oz)   Height: 6' 2" (1 88 m) Physical Exam  Vitals reviewed  Constitutional:       Appearance: He is normal weight  HENT:      Head: Normocephalic  Nose: Nose normal       Mouth/Throat:      Mouth: Mucous membranes are moist    Eyes:      Pupils: Pupils are equal, round, and reactive to light  Cardiovascular:      Rate and Rhythm: Normal rate and regular rhythm  Pulses: Normal pulses  Heart sounds: Normal heart sounds  Pulmonary:      Effort: Pulmonary effort is normal       Breath sounds: Normal breath sounds  Abdominal:      General: Bowel sounds are normal       Palpations: Abdomen is soft  Musculoskeletal:      Cervical back: Neck supple  Skin:     General: Skin is warm and dry  Capillary Refill: Capillary refill takes less than 2 seconds  Neurological:      Mental Status: He is alert  Mental status is at baseline  Psychiatric:         Mood and Affect: Mood normal          Behavior: Behavior normal            Preop labs/testing available and reviewed: yes    eGFR   Date Value Ref Range Status   2021 69 ml/min/1 73sq m Final     WBC   Date Value Ref Range Status   2021 5 22 4 31 - 10 16 Thousand/uL Final     INR   Date Value Ref Range Status   2021 1 15 0 84 - 1 19 Final       EKG no    Echo no    Stress test/cath no    PFT/Marty no    Functional capacity: Walking , 4-5 MPH               4 Mets   Pick the highest level patient can comfortably perform   4 mets or greater for surgery    RCRI  High Risk surgery? 1 Point  CAD History:         1 Point   MI; Positive Stress Test; CP due to Mi;  Nitrate Usage to control Angina;  Pathologic Q wave on EKG  CHF Active:         1 Point   Pulm Edema; Paroxysmal Nocturnal Dyspnea;  Bibasilar Rales (crackles);S3; CHF on CXR  Cerebrovascular Disease (TIA or CVA):     1 Point  DM on Insulin:        1 Point  Serum Creat >2 0 mg/dl:       1 Point          Total Points: 2     Scorin: Class I, Very Low Risk (0 4%)     1: Class II, Low risk (0 9%)     2: Class III Moderate (6 6%)     3: Class IV High (>11%)      KENNY Risk:  GFR:   eGFR   Date Value Ref Range Status   06/14/2021 69 ml/min/1 73sq m Final         For PCP:  If GFR>60, Hold ACE/ARB/Diuretic on the day of surgery, and NSAIDS 10 days before  If GFR<45, Consider PRE and POST op Nephrology Consult  If 46 <GFR> 59 : Has Patient had KENNY in last 6 Months? no   If YES: Preop Nephrology consult   If No:  Charly Bernstein Nephrology consult  Assessment/Plan:    Patient is medically optimized (cleared) for the planned procedure  Further testing/evaluation is not required  Postop concerns: no    Problem List Items Addressed This Visit        Cardiovascular and Mediastinum    Essential hypertension    Relevant Medications    losartan (COZAAR) 50 mg tablet    labetalol (NORMODYNE) 200 mg tablet    amLODIPine (NORVASC) 5 mg tablet       Other    History of hemorrhagic cerebrovascular accident (CVA) without residual deficits    Relevant Medications    atorvastatin (LIPITOR) 10 mg tablet    Preoperative clearance - Primary      Other Visit Diagnoses     Primary osteoarthritis of left knee        Relevant Medications    zinc sulfate (ZINCATE) 220 mg capsule    folic acid (FOLVITE) 1 mg tablet    ferrous sulfate 324 (65 Fe) mg    cholecalciferol (VITAMIN D3) 1,000 units tablet    ascorbic Acid (VITAMIN C) 500 MG CPCR    Chronic pain of left knee        Relevant Medications    zinc sulfate (ZINCATE) 220 mg capsule    folic acid (FOLVITE) 1 mg tablet    ferrous sulfate 324 (65 Fe) mg    cholecalciferol (VITAMIN D3) 1,000 units tablet    ascorbic Acid (VITAMIN C) 500 MG CPCR    Major depressive disorder in partial remission, unspecified whether recurrent (HCC)        Relevant Medications    escitalopram (LEXAPRO) 5 mg tablet           Diagnoses and all orders for this visit:    Primary osteoarthritis of left knee  -     zinc sulfate (ZINCATE) 220 mg capsule;  Take 1 capsule (220 mg total) by mouth daily  -     folic acid (FOLVITE) 1 mg tablet; Take 1 tablet (1 mg total) by mouth daily  -     ferrous sulfate 324 (65 Fe) mg; Take 1 tablet (324 mg total) by mouth daily before breakfast  -     cholecalciferol (VITAMIN D3) 1,000 units tablet; Take 1 tablet (1,000 Units total) by mouth daily  -     ascorbic Acid (VITAMIN C) 500 MG CPCR; Take 1 capsule (500 mg total) by mouth 2 (two) times a day    Chronic pain of left knee  -     zinc sulfate (ZINCATE) 220 mg capsule; Take 1 capsule (220 mg total) by mouth daily  -     folic acid (FOLVITE) 1 mg tablet; Take 1 tablet (1 mg total) by mouth daily  -     ferrous sulfate 324 (65 Fe) mg; Take 1 tablet (324 mg total) by mouth daily before breakfast  -     cholecalciferol (VITAMIN D3) 1,000 units tablet; Take 1 tablet (1,000 Units total) by mouth daily  -     ascorbic Acid (VITAMIN C) 500 MG CPCR; Take 1 capsule (500 mg total) by mouth 2 (two) times a day    Essential hypertension  -     losartan (COZAAR) 50 mg tablet; Take 1 tablet (50 mg total) by mouth 2 (two) times a day  -     labetalol (NORMODYNE) 200 mg tablet; Take 1 tablet (200 mg total) by mouth every 12 (twelve) hours  -     amLODIPine (NORVASC) 5 mg tablet; Take 1 tablet (5 mg total) by mouth 2 (two) times a day    Major depressive disorder in partial remission, unspecified whether recurrent (HCC)  -     escitalopram (LEXAPRO) 5 mg tablet; Take 1 tablet (5 mg total) by mouth daily at bedtime    History of hemorrhagic cerebrovascular accident (CVA) without residual deficits  -     atorvastatin (LIPITOR) 10 mg tablet; Take 1 tablet (10 mg total) by mouth daily with dinner        No outpatient medications have been marked as taking for the 7/12/21 encounter (Office Visit) with Rell Patterson MD         NOTE: Please use the above to review important meds for your specialty, the remainder "per anesthesia Guidelines "    NOTE: Please place an Inbasket message for "[facility]" pool for complicated patients

## 2021-07-13 ENCOUNTER — HOSPITAL ENCOUNTER (INPATIENT)
Facility: HOSPITAL | Age: 71
LOS: 1 days | Discharge: HOME | DRG: 544 | End: 2021-07-14
Attending: ORTHOPAEDIC SURGERY | Admitting: ORTHOPAEDIC SURGERY
Payer: COMMERCIAL

## 2021-07-13 ENCOUNTER — APPOINTMENT (INPATIENT)
Dept: RADIOLOGY | Facility: HOSPITAL | Age: 71
DRG: 544 | End: 2021-07-13
Payer: COMMERCIAL

## 2021-07-13 ENCOUNTER — ANESTHESIA (OUTPATIENT)
Dept: PERIOP | Facility: HOSPITAL | Age: 71
DRG: 544 | End: 2021-07-13
Payer: COMMERCIAL

## 2021-07-13 DIAGNOSIS — Z96.652 STATUS POST TOTAL LEFT KNEE REPLACEMENT USING CEMENT: Primary | ICD-10-CM

## 2021-07-13 PROCEDURE — C1776 JOINT DEVICE (IMPLANTABLE): HCPCS | Performed by: ORTHOPAEDIC SURGERY

## 2021-07-13 PROCEDURE — 97110 THERAPEUTIC EXERCISES: CPT

## 2021-07-13 PROCEDURE — C1713 ANCHOR/SCREW BN/BN,TIS/BN: HCPCS | Performed by: ORTHOPAEDIC SURGERY

## 2021-07-13 PROCEDURE — 27447 TOTAL KNEE ARTHROPLASTY: CPT | Performed by: PHYSICIAN ASSISTANT

## 2021-07-13 PROCEDURE — 0SRD0J9 REPLACEMENT OF LEFT KNEE JOINT WITH SYNTHETIC SUBSTITUTE, CEMENTED, OPEN APPROACH: ICD-10-PCS | Performed by: ORTHOPAEDIC SURGERY

## 2021-07-13 PROCEDURE — 27447 TOTAL KNEE ARTHROPLASTY: CPT | Performed by: ORTHOPAEDIC SURGERY

## 2021-07-13 PROCEDURE — 99024 POSTOP FOLLOW-UP VISIT: CPT | Performed by: ORTHOPAEDIC SURGERY

## 2021-07-13 PROCEDURE — C9290 INJ, BUPIVACAINE LIPOSOME: HCPCS | Performed by: ANESTHESIOLOGY

## 2021-07-13 PROCEDURE — 97163 PT EVAL HIGH COMPLEX 45 MIN: CPT

## 2021-07-13 PROCEDURE — 73560 X-RAY EXAM OF KNEE 1 OR 2: CPT

## 2021-07-13 DEVICE — ATTUNE KNEE SYSTEM TIBIAL INSERT FIXED BEARING CRUCIATE RETAINING 7 12MM AOX
Type: IMPLANTABLE DEVICE | Site: KNEE | Status: FUNCTIONAL
Brand: ATTUNE

## 2021-07-13 DEVICE — ATTUNE KNEE SYSTEM TIBIAL BASE FIXED BEARING SIZE 7 CEMENTED
Type: IMPLANTABLE DEVICE | Site: KNEE | Status: FUNCTIONAL
Brand: ATTUNE

## 2021-07-13 DEVICE — ATTUNE KNEE SYSTEM FEMORAL CRUCIATE RETAINING SIZE 7 LEFT CEMENTED
Type: IMPLANTABLE DEVICE | Site: KNEE | Status: FUNCTIONAL
Brand: ATTUNE

## 2021-07-13 DEVICE — PALACOS® R IS A FAST-CURING, RADIOPAQUE, POLY(METHYL METHACRYLATE)-BASED BONE CEMENT.PALACOS ® R CONTAINS THE X-RAY CONTRAST MEDIUM ZIRCONIUM DIOXIDE. TO IMPROVE VISIBILITY IN THE SURGICAL FIELD PALACOS ® R HAS BEEN COLOURED WITH CHLOROPHYLL (E141). THE BONE CEMENT IS PREPARED DIRECTLY BEFORE USE BY MIXING A POLYMER POWDER COMPONENT WITH A LIQUID MONOMER COMPONENT. A DUCTILE DOUGH FORMS WHICH CURES WITHIN A FEW MINUTES.
Type: IMPLANTABLE DEVICE | Site: KNEE | Status: FUNCTIONAL
Brand: PALACOS®

## 2021-07-13 DEVICE — ATTUNE PATELLA MEDIALIZED DOME 38MM CEMENTED AOX
Type: IMPLANTABLE DEVICE | Site: KNEE | Status: FUNCTIONAL
Brand: ATTUNE

## 2021-07-13 RX ORDER — DEXAMETHASONE SODIUM PHOSPHATE 4 MG/ML
10 INJECTION, SOLUTION INTRA-ARTICULAR; INTRALESIONAL; INTRAMUSCULAR; INTRAVENOUS; SOFT TISSUE ONCE
Status: COMPLETED | OUTPATIENT
Start: 2021-07-14 | End: 2021-07-14

## 2021-07-13 RX ORDER — OXYCODONE HCL 10 MG/1
10 TABLET, FILM COATED, EXTENDED RELEASE ORAL ONCE
Status: COMPLETED | OUTPATIENT
Start: 2021-07-13 | End: 2021-07-13

## 2021-07-13 RX ORDER — LABETALOL 100 MG/1
200 TABLET, FILM COATED ORAL EVERY 12 HOURS SCHEDULED
Status: DISCONTINUED | OUTPATIENT
Start: 2021-07-13 | End: 2021-07-14 | Stop reason: HOSPADM

## 2021-07-13 RX ORDER — CHLORHEXIDINE GLUCONATE 0.12 MG/ML
15 RINSE ORAL ONCE
Status: COMPLETED | OUTPATIENT
Start: 2021-07-13 | End: 2021-07-13

## 2021-07-13 RX ORDER — ESCITALOPRAM OXALATE 5 MG/1
5 TABLET ORAL
Status: DISCONTINUED | OUTPATIENT
Start: 2021-07-13 | End: 2021-07-14 | Stop reason: HOSPADM

## 2021-07-13 RX ORDER — CEFAZOLIN SODIUM 2 G/50ML
2000 SOLUTION INTRAVENOUS EVERY 8 HOURS
Status: COMPLETED | OUTPATIENT
Start: 2021-07-13 | End: 2021-07-13

## 2021-07-13 RX ORDER — BUPIVACAINE HYDROCHLORIDE 7.5 MG/ML
INJECTION, SOLUTION INTRASPINAL AS NEEDED
Status: DISCONTINUED | OUTPATIENT
Start: 2021-07-13 | End: 2021-07-13

## 2021-07-13 RX ORDER — FENTANYL CITRATE/PF 50 MCG/ML
25 SYRINGE (ML) INJECTION
Status: DISCONTINUED | OUTPATIENT
Start: 2021-07-13 | End: 2021-07-13 | Stop reason: HOSPADM

## 2021-07-13 RX ORDER — SODIUM CHLORIDE, SODIUM LACTATE, POTASSIUM CHLORIDE, CALCIUM CHLORIDE 600; 310; 30; 20 MG/100ML; MG/100ML; MG/100ML; MG/100ML
125 INJECTION, SOLUTION INTRAVENOUS CONTINUOUS
Status: DISCONTINUED | OUTPATIENT
Start: 2021-07-13 | End: 2021-07-13

## 2021-07-13 RX ORDER — ATORVASTATIN CALCIUM 10 MG/1
10 TABLET, FILM COATED ORAL
Status: DISCONTINUED | OUTPATIENT
Start: 2021-07-13 | End: 2021-07-14 | Stop reason: HOSPADM

## 2021-07-13 RX ORDER — DOCUSATE SODIUM 100 MG/1
100 CAPSULE, LIQUID FILLED ORAL 2 TIMES DAILY
Status: DISCONTINUED | OUTPATIENT
Start: 2021-07-13 | End: 2021-07-14 | Stop reason: HOSPADM

## 2021-07-13 RX ORDER — SODIUM CHLORIDE 9 MG/ML
75 INJECTION, SOLUTION INTRAVENOUS CONTINUOUS
Status: DISCONTINUED | OUTPATIENT
Start: 2021-07-13 | End: 2021-07-14 | Stop reason: ALTCHOICE

## 2021-07-13 RX ORDER — AMLODIPINE BESYLATE 5 MG/1
5 TABLET ORAL 2 TIMES DAILY
Status: DISCONTINUED | OUTPATIENT
Start: 2021-07-14 | End: 2021-07-14 | Stop reason: HOSPADM

## 2021-07-13 RX ORDER — ACETAMINOPHEN 325 MG/1
975 TABLET ORAL ONCE
Status: COMPLETED | OUTPATIENT
Start: 2021-07-13 | End: 2021-07-13

## 2021-07-13 RX ORDER — MAGNESIUM HYDROXIDE 1200 MG/15ML
LIQUID ORAL AS NEEDED
Status: DISCONTINUED | OUTPATIENT
Start: 2021-07-13 | End: 2021-07-13 | Stop reason: HOSPADM

## 2021-07-13 RX ORDER — MAGNESIUM HYDROXIDE/ALUMINUM HYDROXICE/SIMETHICONE 120; 1200; 1200 MG/30ML; MG/30ML; MG/30ML
30 SUSPENSION ORAL EVERY 6 HOURS PRN
Status: DISCONTINUED | OUTPATIENT
Start: 2021-07-13 | End: 2021-07-14 | Stop reason: HOSPADM

## 2021-07-13 RX ORDER — OXYCODONE HYDROCHLORIDE 5 MG/1
5 TABLET ORAL EVERY 4 HOURS PRN
Status: DISCONTINUED | OUTPATIENT
Start: 2021-07-13 | End: 2021-07-14 | Stop reason: HOSPADM

## 2021-07-13 RX ORDER — HYDROMORPHONE HCL/PF 1 MG/ML
0.5 SYRINGE (ML) INJECTION EVERY 2 HOUR PRN
Status: DISCONTINUED | OUTPATIENT
Start: 2021-07-13 | End: 2021-07-14 | Stop reason: HOSPADM

## 2021-07-13 RX ORDER — PROPOFOL 10 MG/ML
INJECTION, EMULSION INTRAVENOUS CONTINUOUS PRN
Status: DISCONTINUED | OUTPATIENT
Start: 2021-07-13 | End: 2021-07-13

## 2021-07-13 RX ORDER — BUPIVACAINE HYDROCHLORIDE 2.5 MG/ML
INJECTION, SOLUTION EPIDURAL; INFILTRATION; INTRACAUDAL AS NEEDED
Status: DISCONTINUED | OUTPATIENT
Start: 2021-07-13 | End: 2021-07-13 | Stop reason: HOSPADM

## 2021-07-13 RX ORDER — ONDANSETRON 2 MG/ML
4 INJECTION INTRAMUSCULAR; INTRAVENOUS ONCE AS NEEDED
Status: DISCONTINUED | OUTPATIENT
Start: 2021-07-13 | End: 2021-07-13 | Stop reason: HOSPADM

## 2021-07-13 RX ORDER — PROPOFOL 10 MG/ML
INJECTION, EMULSION INTRAVENOUS AS NEEDED
Status: DISCONTINUED | OUTPATIENT
Start: 2021-07-13 | End: 2021-07-13

## 2021-07-13 RX ORDER — MIDAZOLAM HYDROCHLORIDE 2 MG/2ML
INJECTION, SOLUTION INTRAMUSCULAR; INTRAVENOUS AS NEEDED
Status: DISCONTINUED | OUTPATIENT
Start: 2021-07-13 | End: 2021-07-13

## 2021-07-13 RX ORDER — SCOLOPAMINE TRANSDERMAL SYSTEM 1 MG/1
1 PATCH, EXTENDED RELEASE TRANSDERMAL ONCE
Status: DISCONTINUED | OUTPATIENT
Start: 2021-07-13 | End: 2021-07-14 | Stop reason: HOSPADM

## 2021-07-13 RX ORDER — SODIUM CHLORIDE, SODIUM LACTATE, POTASSIUM CHLORIDE, CALCIUM CHLORIDE 600; 310; 30; 20 MG/100ML; MG/100ML; MG/100ML; MG/100ML
20 INJECTION, SOLUTION INTRAVENOUS CONTINUOUS
Status: CANCELLED | OUTPATIENT
Start: 2021-07-13

## 2021-07-13 RX ORDER — BUPIVACAINE HYDROCHLORIDE 5 MG/ML
INJECTION, SOLUTION PERINEURAL
Status: DISCONTINUED | OUTPATIENT
Start: 2021-07-13 | End: 2021-07-13

## 2021-07-13 RX ORDER — GABAPENTIN 100 MG/1
200 CAPSULE ORAL 2 TIMES DAILY
Status: DISCONTINUED | OUTPATIENT
Start: 2021-07-13 | End: 2021-07-14 | Stop reason: HOSPADM

## 2021-07-13 RX ORDER — CEFAZOLIN SODIUM 2 G/50ML
SOLUTION INTRAVENOUS AS NEEDED
Status: DISCONTINUED | OUTPATIENT
Start: 2021-07-13 | End: 2021-07-13

## 2021-07-13 RX ORDER — ASPIRIN 325 MG
325 TABLET ORAL 2 TIMES DAILY
Status: DISCONTINUED | OUTPATIENT
Start: 2021-07-13 | End: 2021-07-14 | Stop reason: HOSPADM

## 2021-07-13 RX ORDER — CEFAZOLIN SODIUM 2 G/50ML
2000 SOLUTION INTRAVENOUS ONCE
Status: DISCONTINUED | OUTPATIENT
Start: 2021-07-13 | End: 2021-07-13 | Stop reason: HOSPADM

## 2021-07-13 RX ORDER — OXYCODONE HYDROCHLORIDE 10 MG/1
10 TABLET ORAL EVERY 4 HOURS PRN
Status: DISCONTINUED | OUTPATIENT
Start: 2021-07-13 | End: 2021-07-14 | Stop reason: HOSPADM

## 2021-07-13 RX ORDER — ACETAMINOPHEN 325 MG/1
975 TABLET ORAL EVERY 8 HOURS
Status: DISCONTINUED | OUTPATIENT
Start: 2021-07-13 | End: 2021-07-14 | Stop reason: HOSPADM

## 2021-07-13 RX ORDER — ONDANSETRON 2 MG/ML
4 INJECTION INTRAMUSCULAR; INTRAVENOUS EVERY 6 HOURS PRN
Status: DISCONTINUED | OUTPATIENT
Start: 2021-07-13 | End: 2021-07-14 | Stop reason: HOSPADM

## 2021-07-13 RX ORDER — PANTOPRAZOLE SODIUM 40 MG/1
40 TABLET, DELAYED RELEASE ORAL DAILY
Status: DISCONTINUED | OUTPATIENT
Start: 2021-07-13 | End: 2021-07-14 | Stop reason: HOSPADM

## 2021-07-13 RX ORDER — GABAPENTIN 300 MG/1
300 CAPSULE ORAL ONCE
Status: COMPLETED | OUTPATIENT
Start: 2021-07-13 | End: 2021-07-13

## 2021-07-13 RX ADMIN — MIDAZOLAM 1 MG: 1 INJECTION INTRAMUSCULAR; INTRAVENOUS at 07:31

## 2021-07-13 RX ADMIN — TRANEXAMIC ACID 1000 MG: 1 INJECTION, SOLUTION INTRAVENOUS at 07:38

## 2021-07-13 RX ADMIN — HYDROMORPHONE HYDROCHLORIDE 0.5 MG: 1 INJECTION, SOLUTION INTRAMUSCULAR; INTRAVENOUS; SUBCUTANEOUS at 23:26

## 2021-07-13 RX ADMIN — ACETAMINOPHEN 975 MG: 325 TABLET, FILM COATED ORAL at 20:10

## 2021-07-13 RX ADMIN — OXYCODONE HYDROCHLORIDE 10 MG: 10 TABLET, FILM COATED, EXTENDED RELEASE ORAL at 06:38

## 2021-07-13 RX ADMIN — PROPOFOL 70 MCG/KG/MIN: 10 INJECTION, EMULSION INTRAVENOUS at 07:31

## 2021-07-13 RX ADMIN — SODIUM CHLORIDE 75 ML/HR: 0.9 INJECTION, SOLUTION INTRAVENOUS at 23:31

## 2021-07-13 RX ADMIN — PROPOFOL 50 MG: 10 INJECTION, EMULSION INTRAVENOUS at 07:31

## 2021-07-13 RX ADMIN — CHLORHEXIDINE GLUCONATE 15 ML: 1.2 SOLUTION ORAL at 06:39

## 2021-07-13 RX ADMIN — SODIUM CHLORIDE 75 ML/HR: 0.9 INJECTION, SOLUTION INTRAVENOUS at 11:53

## 2021-07-13 RX ADMIN — SODIUM CHLORIDE, SODIUM LACTATE, POTASSIUM CHLORIDE, AND CALCIUM CHLORIDE 125 ML/HR: .6; .31; .03; .02 INJECTION, SOLUTION INTRAVENOUS at 06:59

## 2021-07-13 RX ADMIN — PANTOPRAZOLE SODIUM 40 MG: 40 TABLET, DELAYED RELEASE ORAL at 11:52

## 2021-07-13 RX ADMIN — GABAPENTIN 200 MG: 100 CAPSULE ORAL at 11:52

## 2021-07-13 RX ADMIN — SODIUM CHLORIDE, SODIUM LACTATE, POTASSIUM CHLORIDE, AND CALCIUM CHLORIDE: .6; .31; .03; .02 INJECTION, SOLUTION INTRAVENOUS at 09:32

## 2021-07-13 RX ADMIN — SCOPALAMINE 1 PATCH: 1 PATCH, EXTENDED RELEASE TRANSDERMAL at 06:37

## 2021-07-13 RX ADMIN — ASPIRIN 325 MG: 325 TABLET ORAL at 21:03

## 2021-07-13 RX ADMIN — BUPIVACAINE HYDROCHLORIDE 10 ML: 5 INJECTION, SOLUTION PERINEURAL at 10:57

## 2021-07-13 RX ADMIN — ACETAMINOPHEN 975 MG: 325 TABLET, FILM COATED ORAL at 06:38

## 2021-07-13 RX ADMIN — ATORVASTATIN CALCIUM 10 MG: 40 TABLET, FILM COATED ORAL at 15:58

## 2021-07-13 RX ADMIN — ACETAMINOPHEN 975 MG: 325 TABLET, FILM COATED ORAL at 11:52

## 2021-07-13 RX ADMIN — DOCUSATE SODIUM 100 MG: 100 CAPSULE, LIQUID FILLED ORAL at 17:48

## 2021-07-13 RX ADMIN — CEFAZOLIN SODIUM 2000 MG: 2 SOLUTION INTRAVENOUS at 07:35

## 2021-07-13 RX ADMIN — BUPIVACAINE HYDROCHLORIDE IN DEXTROSE 1.8 ML: 7.5 INJECTION, SOLUTION SUBARACHNOID at 07:27

## 2021-07-13 RX ADMIN — CEFAZOLIN SODIUM 2000 MG: 2 SOLUTION INTRAVENOUS at 15:58

## 2021-07-13 RX ADMIN — OXYCODONE HYDROCHLORIDE 5 MG: 5 TABLET ORAL at 21:03

## 2021-07-13 RX ADMIN — LABETALOL HYDROCHLORIDE 200 MG: 100 TABLET, FILM COATED ORAL at 21:03

## 2021-07-13 RX ADMIN — MIDAZOLAM 1 MG: 1 INJECTION INTRAMUSCULAR; INTRAVENOUS at 07:19

## 2021-07-13 RX ADMIN — GABAPENTIN 300 MG: 300 CAPSULE ORAL at 06:38

## 2021-07-13 RX ADMIN — CEFAZOLIN SODIUM 2000 MG: 2 SOLUTION INTRAVENOUS at 23:16

## 2021-07-13 RX ADMIN — ESCITALOPRAM 5 MG: 5 TABLET, FILM COATED ORAL at 21:03

## 2021-07-13 RX ADMIN — DOCUSATE SODIUM 100 MG: 100 CAPSULE, LIQUID FILLED ORAL at 11:52

## 2021-07-13 RX ADMIN — GABAPENTIN 200 MG: 100 CAPSULE ORAL at 17:48

## 2021-07-13 NOTE — INTERVAL H&P NOTE
H&P reviewed  After examining the patient I find no changes in the patients condition since the H&P had been written  Patient was seen examined this morning and there have been no changes in his orthopedic exam   He has been cleared by his PCP for today's procedure  He will be a good candidate for aspirin postoperatively for DVT prophylaxis  He will be a good candidate for outpatient physical therapy following his discharge from the hospital   Proceed to OR today for left total knee arthroplasty      Vitals:    07/13/21 0627   BP: 140/85   Pulse: 66   Resp: 18   Temp: (!) 97 2 °F (36 2 °C)   SpO2: 99%

## 2021-07-13 NOTE — PLAN OF CARE
Problem: MOBILITY - ADULT  Goal: Maintain or return to baseline ADL function  Description: INTERVENTIONS:  -  Assess patient's ability to carry out ADLs; assess patient's baseline for ADL function and identify physical deficits which impact ability to perform ADLs (bathing, care of mouth/teeth, toileting, grooming, dressing, etc )  - Assess/evaluate cause of self-care deficits   - Assess range of motion  - Assess patient's mobility; develop plan if impaired  - Assess patient's need for assistive devices and provide as appropriate  - Encourage maximum independence but intervene and supervise when necessary  - Involve family in performance of ADLs  - Assess for home care needs following discharge   - Consider OT consult to assist with ADL evaluation and planning for discharge  - Provide patient education as appropriate  Outcome: Progressing     Problem: SAFETY ADULT  Goal: Maintain or return to baseline ADL function  Description: INTERVENTIONS:  -  Assess patient's ability to carry out ADLs; assess patient's baseline for ADL function and identify physical deficits which impact ability to perform ADLs (bathing, care of mouth/teeth, toileting, grooming, dressing, etc )  - Assess/evaluate cause of self-care deficits   - Assess range of motion  - Assess patient's mobility; develop plan if impaired  - Assess patient's need for assistive devices and provide as appropriate  - Encourage maximum independence but intervene and supervise when necessary  - Involve family in performance of ADLs  - Assess for home care needs following discharge   - Consider OT consult to assist with ADL evaluation and planning for discharge  - Provide patient education as appropriate  Outcome: Progressing

## 2021-07-13 NOTE — PROGRESS NOTES
Progress Note - Orthopedics   Beatriz Cardenas 66 y o  male MRN: [id number]  Unit/Bed#: OR POOL Encounter: [id number]    Assessment:  1) POD#0 s/p L TKA    Plan:  Ancef 2g IV x 2 for 24 hours postop  DVT prophylaxis: ASA 325mg PO BID/SCD's/Ambulation  WBAT LLE  PT/OT- WBAT LLE  Analgesia PRN  Follow up AM labs  D/c kern in AM POD #1  Dressing- monitor for drainage  Discharge planning - disposition pending progress with PT postoperatively  Plan for discharge home tomorrow to start outpatient physical therapy later this week    Weight bearing: WBAT LLE    VTE Pharmacologic Prophylaxis: ASA 325mg PO BID  VTE Mechanical Prophylaxis: sequential compression device    Subjective:  Patient appears comfortable postoperatively  Events of surgery discussed with the patient's daughter  Vitals: Blood pressure 140/85, pulse 66, temperature (!) 97 2 °F (36 2 °C), temperature source Temporal, resp  rate 18, height 6' 2" (1 88 m), weight 76 2 kg (168 lb), SpO2 99 %  ,Body mass index is 21 57 kg/m²  Intake/Output Summary (Last 24 hours) at 7/13/2021 0951  Last data filed at 7/13/2021 7471  Gross per 24 hour   Intake --   Output 250 ml   Net -250 ml       Invasive Devices     Peripheral Intravenous Line            Peripheral IV 04/05/21 Right Wrist 99 days    Peripheral IV 07/13/21 Left Wrist <1 day          Drain            Urethral Catheter Latex 16 Fr  <1 day                Physical Exam: NAD  Ortho Exam: LLE: Dsg c/d/i, compartments soft, calf non-tender, +PF/DF/EHL, +DP/SP/Saph/Sural SILT, DP 2+, foot warm    Lab, Imaging and other studies: PO XR L knee:  Reviewed and acceptable    Jannette NAGEL    Division of Adult Reconstruction  Department of Orthopaedic Surgery  [facility]

## 2021-07-13 NOTE — ANESTHESIA PROCEDURE NOTES
Peripheral Block    Patient location during procedure: post-op  Start time: 7/13/2021 10:43 AM  Reason for block: procedure for pain, at surgeon's request and post-op pain management  Staffing  Performed: anesthesiologist   Anesthesiologist: Marybeth Hogan MD  Preanesthetic Checklist  Completed: patient identified, IV checked, site marked, risks and benefits discussed, surgical consent, monitors and equipment checked, pre-op evaluation and timeout performed  Peripheral Block  Patient position: supine  Prep: ChloraPrep  Patient monitoring: heart rate, cardiac monitor, continuous pulse ox and frequent blood pressure checks  Block type: adductor canal block  Laterality: left  Injection technique: single-shot  Procedures: ultrasound guided, Ultrasound guidance required for the procedure to increase accuracy and safety of medication placement and decrease risk of complications    Ultrasound permanent image savedbupivacaine (MARCAINE) 0 5 % perineural infiltration, 10 mL (exparel 15 ml)  Needle  Needle type: Stimuplex   Needle gauge: 22 G  Needle length: 10 cm  Needle localization: anatomical landmarks and ultrasound guidance  Needle insertion depth: 2 cm  Test dose: negative  Assessment  Injection assessment: incremental injection, local visualized surrounding nerve on ultrasound, negative aspiration for CSF, negative aspiration for heme and no paresthesia on injection  Paresthesia pain: none  Heart rate change: no  Slow fractionated injection: yes  Post-procedure:  site cleaned  patient tolerated the procedure well with no immediate complications  Additional Notes  Images saved ultrasound guided

## 2021-07-13 NOTE — OP NOTE
OPERATIVE REPORT  PATIENT NAME: Arely Young    :    MRN: [id number]  Pt Location: [facility] OR ROOM 03    SURGERY DATE: 2021    Surgeon(s) and Role:     * Mt Noel, DO - Primary     * Maxx Tabares PA-C - Assisting, no qualified resident was available an assistant was needed for patient positioning, prepping and draping, soft tissue retraction, exposure of the tibia and femur, protection of vital structures throughout the case, safe exposure of the femur and tibia for implantation of final prosthesis, superficial closure, and to complete the case safely  Preop Diagnosis:  Primary osteoarthritis of left knee [M17 12]  Chronic pain of left knee [M25 562, G89 29]    Post-Op Diagnosis Codes:     * Primary osteoarthritis of left knee [M17 12]     * Chronic pain of left knee [M25 562, G89 29]    Procedure(s) (LRB):  ARTHROPLASTY KNEE TOTAL (Left)    Specimen(s):  * No specimens in log *    Estimated Blood Loss:   30 mL    Drains:  None  Urethral Catheter Latex 16 Fr  (Active)   Number of days: 0       Anesthesia Type:   Spinal with sedation, postoperative adductor canal block with Exparel    Intravenous fluids:  800 cc    Antibiotics:  Ancef 2 g    Urine output:  Grijalva:  200 cc    Tourniquet time:  66 minutes at 250 mmHg    Implants: Depuy Attune size 7 left CR femur, size 7 S+ tibia, size 12 AOX CR polyethylene, size 38 polyethylene patella    Operative Indications:  Primary osteoarthritis of left knee [M17 12]  Chronic pain of left knee [G73 537, G89 29]  Patient is a 75-year-old male known to me for treatment of his bilateral knee osteoarthritis  He underwent right total knee arthroplasty in 2021 did extremely well  His activity-related pain and deformity in his left knee due to severe end-stage osteoarthritis continued to persist and recommended undergoing left total knee arthroplasty when optimized    Patient was seen evaluated by his medical subspecialist and optimized for the intended procedure  Patient underwent left total knee arthroplasty on 7/13/2021  Operative Findings:  Intraoperatively there was evidence of a significant varus deformity that is passively correctable  There is significant grade 4 degenerative change in all 3 compartments of the knee  The proximal medial tibia had a significant amount of uncontained erosion of the proximal medial tibia  Ultimately a cemented left total knee arthroplasty was successfully implanted without complication  Range of motion was from 0-130 degrees with excellent stability at 0° 30° 90°  Patella tracked midline and flat  After closure of the arthrotomy range of motion, stability, patellar tracking were unchanged  Complications:   None    Procedure and Technique:  The patient was identified and marked in the preoperative holding area  Final questions were addressed  Consents were visualized for correct laterality and the intended procedure  Patient was taken back to the operating room where they were transferred to the operating room table and administered spinal  anesthesia by the anesthesia staff  Tourniquet was then applied to the left thigh  The right lower extremity was draped over the foot break in the bed after the split leg attachment was removed, and the popliteal fossa was well padded and the leg was secured in the flexed position off of the operating table  The left lower extremity was prepped and draped in the standard sterile fashion with the addition of the Monte knee positioner  The patient was administered 2 g of IV Ancef  Tranexamic acid  was administered by the anesthesia staff  A final timeout was performed and all members of the operating room staff were in agreement of the intended procedure and the correct laterality was confirmed  An anterior knee incision was marked over the anterior left knee and carried over the medial portion of the patella down medial to the tibial tubercle    The leg was exsanguinated and the tourniquet was inflated to 250 mmHg  An incision was made over the anterior knee using a scalpel, and sharp dissection was carried deep through Narendra's fascia creating full thickness flaps  The extensor mechanism was identified  A standard medial parapatellar arthrotomy was performed using a scalpel, and upon doing so benign-appearing yellow intra-articular fluid was expressed  The anterior horn of the medial and lateral meniscus was removed  50% of the patellar fat pad was removed for proper exposure  The distal arthrotomy was used to initiate a medial release around the proximal tibia at the joint line to the mid coronal plane  On inspection there was diffuse grade 4 degenerative change in the medial compartment, lateral compartment, and patellofemoral compartment  the intramedullary notch had to be decompressed of osteophytes utilizing a osteotome and a mallet  The osteophytes around the femur were also removed  The intramedullary drill for the femur was introduced into the medullary canal anterior to the PCL  The distal femoral cutting jig was inserted after being set to 5° of valgus and a 9 mm resection  The distal femoral cutting jig was pinned in place at the 0 position  The distal femur was cut using an oscillating saw  The distal femoral sizing rotation guide was applied to the distal femur and femoral rotation was set to match Troup's line and confirmed with the epicondylar axis  This was found to be 3° of external rotation and its position was pinned  The femur was sized to be a size 7  The sizing rotation guide was removed and a size 7  four-in-one cutting block was applied to the distal femur  Checking the anterior resection this appeared to be the appropriate size both in the AP and ML planes, and would not cause anterior femoral notching   The block was pinned in place and the anterior, posterior, posterior chamfer, and anterior chamfer cuts were made in succession more protecting all periarticular soft tissues and ligaments  The cutting block was removed and the perimeter of the femur was cleaned from remaining osteophytes using a rongeur  The ACL and PCL were removed using electrocautery  The distal femur finishing guide was applied to the distal femur and the trochlea was completed  The tibia was subluxed anteriorly in preparation for the tibial resection  The osteophytes around the proximal tibia were removed utilizing a rongeur  The remaining portions of the medial and lateral meniscus were removed using a 15 blade being sure not to violate the MCL or LCL  The area of the lateral geniculate artery was then cauterized using electrocautery  On inspection of the tibia there significant uncontained erosion of the medial tibial plateau and mild erosion of the lateral tibial plateau  The tibial cutting jig and extra-medullary alignment guide was applied to the leg  The level of resection was set to the osteochondral junction off the proximal medial tibia  The patient's native slope was evaluated and it was deemed that 3° of posterior slope would be appropriate  The jig was pinned in the medial lateral proximal tibia  Varus valgus alignment was confirmed and was appropriate at neutral to the mechanical axis  With the cutting jig pinned into place and proximal tibia was resected while protecting soft tissues  The proximal tibial osteophytes were removed  A trial tibial baseplate was placed upon the proximal tibia to check alignment and the alignment was unchanged and remained in neutral  The proximal tibia was sized and a size 7 baseplate was found to be appropriate and provided good cortical fit  The baseplate was pinned into place in appropriate rotation aligning with the medial third of the tibial tubercle  The tibia was then reamed, broached, and finished in sequence in preparation for trialing       Trialing was initiated with a size 8 polyethylene insert placed upon the size 7 tibial baseplate  The size 7 standard femur was then placed upon the femur and trialing began  The 12 mm polyethylene trial demonstrated balanced medial and lateral gaps in flexion and extension while maintaining full extension and reaching 130° of flexion  Coronal stability testing revealed that medial lateral gaps were symmetric and extension, mid flexion, and full flexion  The knee demonstrated excellent stability with a range of motion from 0-130° of flexion  The femoral lug drills were drilled, and the trials were removed  Attention was then turned to the patella  The osteo synovial reflection was revealed using a Bovie  The patella height measured 26 millimeters prior to resection  The patella was sized and found to be a 38 mm patellar button  The cutting guide was set for the appropriate depth and the patella was evenly resected using oscillating saw  The 38 mm patellar button was then medialized over the patella and the lug holes were drilled  A trial patella button was placed upon the patella and the pre osteotomy patellar height was restored to 25 5  A lateral facetectomy of the patella was performed  The tibial trial was removed and the knee was irrigated and debris was removed  The soft tissues of the posterior capsule were cauterized using Bovie to ensure hemostasis  Remaining fragments of the posterior medial and lateral meniscus were removed  The posterior capsule and medial and lateral periarticular soft tissues were injected with a periarticular analgesic cocktail  The knee was again irrigated in preparation for cementation  The tibia was subluxed and all retractors were in place for cementation when final implants were confirmed and placed upon the back table  2 bags of Palacos cement without gentamicin were mixed and the final size 7  S+ tibial implant, 12 mm AOX CR conforming polyethylene insert, size 7 left CR femur, and 38 mm patella button were cemented in sequence  Excess cement was removed after each implant was in place  As the cement cured the remainder of the periarticular pain cocktail was injected into the soft tissues around the knee  Irrigation then followed with IrriSept solution followed by normal saline  After the cement had cured the joint was inspected for any residual loose bodies of cement and these were removed  The tourniquet was released after 66 minutes at 250 mmHg  The tracking and stability of the final components were assessed  The patella tracked midline without tilt, and the knee was stable in both flexion and extension, coronal stability was unchanged, and the knee demonstrated range of motion from 0-130°  The knee was again irrigated and a very conservative partial synovectomy was performed  Hemostasis was achieved using electrocautery  Closure began using a #2 barbed bidirectional suture for the arthrotomy  After closure of the arthrotomy range of motion to gravity was tested and was found to be 0-130° of flexion  Quarter percent Marcaine plain was injected in the subcutaneous soft tissues  The deep subcutaneous tissues were reapproximated with undyed 0 Vicryl, the superficial subcutaneous tissues were reapproximated with undyed 2-0 Vicryl, the skin edges reapproximated with a running 3-0 bidirectional barbed Monocryl suture, and the skin edges were sealed with Dermabond  After the Dermabond had dried a silver impregnated Mepilex dressing was placed over the incision  The drapes were removed and JAMEE stockings were placed on the bilateral lower extremities  Patient was then awakened from anesthesia without difficulty, and transferred to PACU in stable condition        I was present for all critical portions of the procedure    Patient Disposition:  PACU     SIGNATURE: Jannet Irwin DO  DATE: July 13, 2021  TIME: 9:44 AM

## 2021-07-13 NOTE — PHYSICAL THERAPY NOTE
PT EVALUATION         07/13/21 1420   Note Type   Note type Evaluation   Pain Assessment   Pain Assessment Tool 0-10   Pain Score 2   Pain Location/Orientation   (L knee)   Home Living   Type of [address]   (bedroom in down full flight in basement)   Home Equipment Walker;Cane   Prior Function   Level of [place] Independent with ADLs and functional mobility   Lives With Family; Spouse;Daughter   ADL Assistance Independent   Restrictions/Precautions   Weight Bearing Precautions Per Order Yes   LLE Weight Bearing Per Order WBAT   Other Precautions Chair Alarm; Bed Alarm; Fall Risk;Pain  (pt speaks some english, first language is panjabi)   General   Additional Pertinent History Pt is POD zero s/p L TKA  Cognition   Overall Cognitive Status WFL   Following Commands Follows one step commands without difficulty   RLE Assessment   RLE Assessment WNL   LLE Assessment   LLE Assessment   (knee ROM 0-90, MMT hip 3+/5, knee 3+/5, ankle 4/5)   Bed Mobility   Supine to Sit 5  Supervision   Sit to Supine 5  Supervision   Transfers   Sit to Stand 3  Moderate assistance   Stand to Sit 3  Moderate assistance   Stand pivot 4  Minimal assistance   Additional items   (with walker)   Ambulation/Elevation   Gait pattern   (wide CHUCK, small step length)   Gait Assistance 4  Minimal assist   Assistive Device Rolling walker   Distance 10 feet   Balance   Static Sitting Good   Dynamic Sitting Fair +   Static Standing Fair +   Dynamic Standing Fair   Ambulatory Fair   Activity Tolerance   Activity Tolerance Patient tolerated treatment well   Assessment   Prognosis Excellent   Problem List Decreased strength;Decreased range of motion; Impaired balance;Decreased mobility;Pain   Assessment Patient seen for Physical Therapy evaluation  Patient admitted with Status post total left knee replacement using cement  Comorbidities affecting patient's physical performance include: CVA, anemia, anxiety    Personal factors affecting patient at time of initial evaluation include: lives in 2 story house, inability to navigate level surfaces without external assistance, anxiety and inability to perform ADLS  Prior to admission, patient was independent with functional mobility without assistive device and independent with ADLS  Please find objective findings from Physical Therapy assessment regarding body systems outlined above with impairments and limitations including weakness, impaired balance, gait deviations, pain, decreased activity tolerance, decreased functional mobility tolerance and fall risk  The Barthel Index was used as a functional outcome tool presenting with a score of 45 today indicating marked limitations of functional mobility and ADLS  Patient's clinical presentation is currently unstable/unpredictable as seen in patient's presentation of changing level of pain, new onset of impairment of functional mobility, decreased endurance and new onset of weakness  Pt would benefit from continued Physical Therapy treatment to address deficits as defined above and maximize level of functional mobility  As demonstrated by objective findings, the assigned level of complexity for this evaluation is high  The patient's AM-[facility] Basic Mobility Inpatient Short Form Raw Score is 18, Standardized Score is 41 05  A standardized score less than 42 9 suggests the patient may benefit from discharge to post-acute rehabilitation services, however anticipate that pt will make rapid progress as pt is POD zero s/p TKA  Sharon Spencer Recommend OP PT upon DC     Goals   Patient Goals "walk without pain"   [facility] Expiration Date 07/20/21   Short Term Goal #1 Independent bed mobility, independent transfers bed to chair, independent ambulation indoor level surfaces with a rolling walker 50 ft so patient can negotiate is home, independent up and down 1 flight of steps with a rail and a cane so patient can get to and from his bedroom, independent home exercise program   [facility]TG Expiration Date 07/27/21   Long Term Goal #1 Improved left knee range of motion to 0-105, improve left lower extremity strength to at least 4/5, patient will ambulate independently with a rolling walker outdoor level surfaces 200 ft within normal gait   Plan   Treatment/Interventions ADL retraining;Functional transfer training;LE strengthening/ROM; Elevations; Therapeutic exercise;Cognitive reorientation;Gait training;Bed mobility; Equipment eval/education   PT Frequency Twice a day   Recommendation   PT Discharge Recommendation Home with outpatient rehabilitation   Equipment Recommended   (pt has a cane and a walker)   [facility] [address] Mobility Inpatient   Turning in Bed Without Bedrails 4   Lying on Back to Sitting on Edge of Flat Bed 4   Moving Bed to Chair 2   Standing Up From Chair 2   Walk in Room 3   Climb 3-5 Stairs 3   Basic Mobility Inpatient Raw Score 18   Basic Mobility Standardized Score 41 05   Barthel Index   Feeding 10   Bathing 0   Grooming Score 0   Dressing Score 5   Bladder Score 0   Bowels Score 10   Toilet Use Score 5   Transfers (Bed/Chair) Score 10   Mobility (Level Surface) Score 0   Stairs Score 5   Barthel Index Score 45   Licensure   [place] License Number  Dinesh PT [id number]       Time In:1410  Time Out:1420  Total Time: 10      S:  "I'm very good"  O:  X 10 each quad set, ankle pumps, SLR  Pt ambulated 60 feet with a rolling walker with min assist   Quality of gait improved with practice  Pt left OOB in chair with alarm and call bell in reach  A:  Pt tolerated POD zero s/p L TKA well  Anticipate pt will be able to go home with his daughter with OP PT tomorrow if cleared medically  P: Continue PT BID    Assess stair climbing in AM      Joce Toribio PT

## 2021-07-13 NOTE — ANESTHESIA PROCEDURE NOTES
Spinal Block    Patient location during procedure: OR  Start time: 7/13/2021 7:32 AM  Reason for block: procedure for pain, at surgeon's request, post-op pain management and primary anesthetic  Staffing  Performed: anesthesiologist and resident/CRNA   Anesthesiologist: Abdulaziz Jackman MD  Resident/CRNA: Kari Mckeon CRNA  Preanesthetic Checklist  Completed: patient identified, IV checked, site marked, risks and benefits discussed, surgical consent, monitors and equipment checked, pre-op evaluation and timeout performed  Spinal Block  Patient position: sitting  Prep: ChloraPrep  Patient monitoring: continuous pulse ox, frequent blood pressure checks, cardiac monitor and heart rate  Approach: midline  Location: L2-3  Injection technique: single-shot  Needle  Needle type: Quincke   Needle gauge: 22 G  Needle length: 5 cm  Assessment  Sensory level: T4  Events: cerebrospinal fluid  Injection Assessment:  negative aspiration for heme, no paresthesia on injection and positive aspiration for clear CSF    Post-procedure:  site cleaned  Additional Notes  bupi  0 75% 1 8 ml with epi wash

## 2021-07-13 NOTE — ANESTHESIA POSTPROCEDURE EVALUATION
Post-Op Assessment Note    CV Status:  Stable    Pain management: adequate     Mental Status:  Alert and awake   Hydration Status:  Euvolemic   PONV Controlled:  Controlled   Airway Patency:  Patent      Post Op Vitals Reviewed: Yes      Staff: CRNA         No complications documented      BP   124/60   Temp  98   Pulse  76   Resp   18   SpO2   99

## 2021-07-14 VITALS
OXYGEN SATURATION: 94 % | DIASTOLIC BLOOD PRESSURE: 64 MMHG | HEIGHT: 74 IN | HEART RATE: 60 BPM | RESPIRATION RATE: 18 BRPM | SYSTOLIC BLOOD PRESSURE: 136 MMHG | WEIGHT: 168 LBS | BODY MASS INDEX: 21.56 KG/M2 | TEMPERATURE: 98 F

## 2021-07-14 LAB
ANION GAP SERPL CALCULATED.3IONS-SCNC: 10 MMOL/L (ref 4–13)
BASOPHILS # BLD AUTO: 0.06 THOUSANDS/ΜL (ref 0–0.1)
BASOPHILS NFR BLD AUTO: 1 % (ref 0–1)
BUN SERPL-MCNC: 13 MG/DL (ref 5–25)
CALCIUM SERPL-MCNC: 8.5 MG/DL (ref 8.3–10.1)
CHLORIDE SERPL-SCNC: 99 MMOL/L (ref 100–108)
CO2 SERPL-SCNC: 27 MMOL/L (ref 21–32)
CREAT SERPL-MCNC: 0.86 MG/DL (ref 0.6–1.3)
EOSINOPHIL # BLD AUTO: 0.03 THOUSAND/ΜL (ref 0–0.61)
EOSINOPHIL NFR BLD AUTO: 0 % (ref 0–6)
ERYTHROCYTE [DISTWIDTH] IN BLOOD BY AUTOMATED COUNT: 13.1 % (ref 11.6–15.1)
GFR SERPL CREATININE-BSD FRML MDRD: 86 ML/MIN/1.73SQ M
GLUCOSE SERPL-MCNC: 116 MG/DL (ref 65–140)
HCT VFR BLD AUTO: 35.5 % (ref 36.5–49.3)
HGB BLD-MCNC: 11.5 G/DL (ref 12–17)
IMM GRANULOCYTES # BLD AUTO: 0.03 THOUSAND/UL (ref 0–0.2)
IMM GRANULOCYTES NFR BLD AUTO: 0 % (ref 0–2)
LYMPHOCYTES # BLD AUTO: 0.87 THOUSANDS/ΜL (ref 0.6–4.47)
LYMPHOCYTES NFR BLD AUTO: 9 % (ref 14–44)
MCH RBC QN AUTO: 28.9 PG (ref 26.8–34.3)
MCHC RBC AUTO-ENTMCNC: 32.4 G/DL (ref 31.4–37.4)
MCV RBC AUTO: 89 FL (ref 82–98)
MONOCYTES # BLD AUTO: 1.21 THOUSAND/ΜL (ref 0.17–1.22)
MONOCYTES NFR BLD AUTO: 13 % (ref 4–12)
NEUTROPHILS # BLD AUTO: 7.22 THOUSANDS/ΜL (ref 1.85–7.62)
NEUTS SEG NFR BLD AUTO: 77 % (ref 43–75)
NRBC BLD AUTO-RTO: 0 /100 WBCS
PLATELET # BLD AUTO: 218 THOUSANDS/UL (ref 149–390)
PMV BLD AUTO: 9.8 FL (ref 8.9–12.7)
POTASSIUM SERPL-SCNC: 3.6 MMOL/L (ref 3.5–5.3)
RBC # BLD AUTO: 3.98 MILLION/UL (ref 3.88–5.62)
SODIUM SERPL-SCNC: 136 MMOL/L (ref 136–145)
WBC # BLD AUTO: 9.42 THOUSAND/UL (ref 4.31–10.16)

## 2021-07-14 PROCEDURE — 97110 THERAPEUTIC EXERCISES: CPT

## 2021-07-14 PROCEDURE — 80048 BASIC METABOLIC PNL TOTAL CA: CPT | Performed by: PHYSICIAN ASSISTANT

## 2021-07-14 PROCEDURE — 97167 OT EVAL HIGH COMPLEX 60 MIN: CPT

## 2021-07-14 PROCEDURE — 97530 THERAPEUTIC ACTIVITIES: CPT

## 2021-07-14 PROCEDURE — 97116 GAIT TRAINING THERAPY: CPT

## 2021-07-14 PROCEDURE — 85025 COMPLETE CBC W/AUTO DIFF WBC: CPT | Performed by: PHYSICIAN ASSISTANT

## 2021-07-14 PROCEDURE — 99024 POSTOP FOLLOW-UP VISIT: CPT | Performed by: ORTHOPAEDIC SURGERY

## 2021-07-14 PROCEDURE — 97535 SELF CARE MNGMENT TRAINING: CPT

## 2021-07-14 RX ORDER — DOCUSATE SODIUM 100 MG/1
100 CAPSULE, LIQUID FILLED ORAL 2 TIMES DAILY
Qty: 60 CAPSULE | Refills: 0 | Status: SHIPPED | OUTPATIENT
Start: 2021-07-14 | End: 2021-09-08 | Stop reason: SDUPTHER

## 2021-07-14 RX ORDER — ACETAMINOPHEN 325 MG/1
975 TABLET ORAL EVERY 8 HOURS
Refills: 0
Start: 2021-07-14

## 2021-07-14 RX ORDER — ASPIRIN 325 MG
325 TABLET ORAL 2 TIMES DAILY
Qty: 84 TABLET | Refills: 0 | Status: SHIPPED | OUTPATIENT
Start: 2021-07-14 | End: 2021-09-08 | Stop reason: SDUPTHER

## 2021-07-14 RX ORDER — OXYCODONE HYDROCHLORIDE 5 MG/1
TABLET ORAL
Qty: 60 TABLET | Refills: 0 | Status: SHIPPED | OUTPATIENT
Start: 2021-07-14

## 2021-07-14 RX ADMIN — ASPIRIN 325 MG: 325 TABLET ORAL at 09:04

## 2021-07-14 RX ADMIN — DOCUSATE SODIUM 100 MG: 100 CAPSULE, LIQUID FILLED ORAL at 09:04

## 2021-07-14 RX ADMIN — PANTOPRAZOLE SODIUM 40 MG: 40 TABLET, DELAYED RELEASE ORAL at 09:04

## 2021-07-14 RX ADMIN — GABAPENTIN 200 MG: 100 CAPSULE ORAL at 17:18

## 2021-07-14 RX ADMIN — ACETAMINOPHEN 975 MG: 325 TABLET, FILM COATED ORAL at 03:31

## 2021-07-14 RX ADMIN — GABAPENTIN 200 MG: 100 CAPSULE ORAL at 09:04

## 2021-07-14 RX ADMIN — LABETALOL HYDROCHLORIDE 200 MG: 100 TABLET, FILM COATED ORAL at 09:04

## 2021-07-14 RX ADMIN — OXYCODONE HYDROCHLORIDE 10 MG: 10 TABLET ORAL at 19:12

## 2021-07-14 RX ADMIN — DEXAMETHASONE SODIUM PHOSPHATE 10 MG: 4 INJECTION INTRA-ARTICULAR; INTRALESIONAL; INTRAMUSCULAR; INTRAVENOUS; SOFT TISSUE at 09:05

## 2021-07-14 RX ADMIN — OXYCODONE HYDROCHLORIDE 10 MG: 10 TABLET ORAL at 10:52

## 2021-07-14 RX ADMIN — ATORVASTATIN CALCIUM 10 MG: 40 TABLET, FILM COATED ORAL at 17:14

## 2021-07-14 RX ADMIN — ACETAMINOPHEN 975 MG: 325 TABLET, FILM COATED ORAL at 13:43

## 2021-07-14 RX ADMIN — AMLODIPINE BESYLATE 5 MG: 5 TABLET ORAL at 09:04

## 2021-07-14 RX ADMIN — OXYCODONE HYDROCHLORIDE 5 MG: 5 TABLET ORAL at 05:26

## 2021-07-14 RX ADMIN — DOCUSATE SODIUM 100 MG: 100 CAPSULE, LIQUID FILLED ORAL at 17:17

## 2021-07-14 NOTE — UTILIZATION REVIEW
Initial Clinical Review    Elective  surgical procedure  Age/Sex: 66 y o  male  Surgery Date: 7/13/21  Procedure: ARTHROPLASTY KNEE TOTAL (Left)     Anesthesia: Spinal with sedation, postoperative adductor canal block with Exparel  Operative Findings:   Intraoperatively there was evidence of a significant varus deformity that is passively correctable  There is significant grade 4 degenerative change in all 3 compartments of the knee  The proximal medial tibia had a significant amount of uncontained erosion of the proximal medial tibia  Ultimately a cemented left total knee arthroplasty was successfully implanted without complication  Range of motion was from 0-130 degrees with excellent stability at 0° 30° 90°  Patella tracked midline and flat  After closure of the arthrotomy range of motion, stability, patellar tracking were unchanged      POD#1 Progress Note:   Assessment:  1) POD#1 s/p Left TKA   Physical Exam: NAD, A&Ox3,  Resting comfortably in bed  Ortho Exam: LLE:  Left anterior knee Dsg c/d/i, compartments soft, calf non-tender, +PF/DF/EHL, +DP/SP/Saph/Sural SILT, DP 2+, foot warm,  Titus's and foot pumps in place  Vitals: Blood pressure 136/80, pulse 62, temperature 97 8 °F (36 6 °C), resp  rate 18, height 6' 2" (1 88 m), weight 76 2 kg (168 lb), SpO2 94 %  ,Body mass index is 21 57 kg/m²      Plan:  Ancef 2g IV x 2 for 24 hours postop - completed  DVT prophylaxis: ASA 325mg PO BID/SCD's/Ambulation  PT/OT- WBAT LLE  Analgesia PRN  Follow up AM labs - H/H/BUN/cr stable, DC IVF  Grijalva Dc - monitor for void  Dressing- monitor for drainage, Mepilex may remain in place 7 days  Discharge planning - disposition pending progress with PT postoperatively    Plan for discharge home today to start outpatient physical therapy later this week  Admission Orders: Date/Time/Statement:   Admission Orders (From admission, onward)     Ordered        07/13/21 0950  Inpatient Admission  Once                   Orders Placed This Encounter   Procedures    Inpatient Admission     Standing Status:   Standing     Number of Occurrences:   1     Order Specific Question:   Level of Care     Answer:   Med Surg [16]     Order Specific Question:   Estimated length of stay     Answer:   Inpatient Only Surgery     Vital Signs: /84   Pulse 62   Temp 97 8 °F (36 6 °C)   Resp 18   Ht 6' 2" (1 88 m)   Wt 76 2 kg (168 lb)   SpO2 94%   BMI 21 57 kg/m²     Pertinent Labs/Diagnostic Test Results:       Results from last 7 days   Lab Units 07/14/21  0512   WBC Thousand/uL 9 42   HEMOGLOBIN g/dL 11 5*   HEMATOCRIT % 35 5*   PLATELETS Thousands/uL 218   NEUTROS ABS Thousands/µL 7 22     Results from last 7 days   Lab Units 07/14/21  0512   SODIUM mmol/L 136   POTASSIUM mmol/L 3 6   CHLORIDE mmol/L 99*   CO2 mmol/L 27   ANION GAP mmol/L 10   BUN mg/dL 13   CREATININE mg/dL 0 86   EGFR ml/min/1 73sq m 86   CALCIUM mg/dL 8 5     Results from last 7 days   Lab Units 07/14/21  0512   GLUCOSE RANDOM mg/dL 116       Diet: CARDIO 2GM DIET  Mobility: PTOT WBAT  DVT Prophylaxis: ASA BID    Medications/Pain Control: OXYCODONE  Scheduled Medications:  acetaminophen, 975 mg, Oral, Q8H  amLODIPine, 5 mg, Oral, BID  aspirin, 325 mg, Oral, BID  atorvastatin, 10 mg, Oral, Daily With Dinner  dexamethasone, 10 mg, Intravenous, Once  docusate sodium, 100 mg, Oral, BID  escitalopram, 5 mg, Oral, HS  gabapentin, 200 mg, Oral, BID  labetalol, 200 mg, Oral, Q12H DAVID  pantoprazole, 40 mg, Oral, Daily  scopolamine, 1 patch, Transdermal, Once      Continuous IV Infusions:  sodium chloride, 75 mL/hr, Intravenous, Continuous      PRN Meds:  aluminum-magnesium hydroxide-simethicone, 30 mL, Oral, Q6H PRN  HYDROmorphone, 0 5 mg, Intravenous, Q2H PRN  lactated ringers, 1,000 mL, Intravenous, Once PRN   And  lactated ringers, 1,000 mL, Intravenous, Once PRN  magnesium hydroxide, 30 mL, Oral, Daily PRN  ondansetron, 4 mg, Intravenous, Q6H PRN  oxyCODONE, 10 mg, Oral, Q4H PRN  oxyCODONE, 5 mg, Oral, Q4H PRN X2  sodium chloride, 1,000 mL, Intravenous, Once PRN   And  sodium chloride, 1,000 mL, Intravenous, Once PRN        Network Utilization Review Department  ATTENTION: Please call with any questions or concerns to [phone] and carefully listen to the prompts so that you are directed to the right person  All voicemails are confidential   Marino Aquino all requests for admission clinical reviews, approved or denied determinations and any other requests to dedicated fax number below belonging to the campus where the patient is receiving treatment   List of dedicated fax numbers for the Facilities:  [address] DENIALS (Administrative/Medical Necessity) [phone]   [address] (Maternity/NICU/Pediatrics) [phone]   [address] [place] [address] Dr Vickey Trinidad 2000 [id number] [place] [address] Niraj Canales [address] [place] [address] [phone]

## 2021-07-14 NOTE — CASE MANAGEMENT
Per chart review and rounds with physical therapy pt is reported to have no apparent discharge needs at this time  Admitted following elective orthopedic surgery  Plan is home with outpatient therapy and pt has no DME needs

## 2021-07-14 NOTE — DISCHARGE INSTRUCTIONS
Total Knee Replacement     WHAT YOU SHOULD KNOW:   Total knee replacement is surgery to replace a knee joint damaged by wear, injury, or disease  It is also called a total knee arthroplasty  The knee joint is where your femur (thigh bone) and tibia (large lower leg bone or shin bone) meet  A small bone called the patella (kneecap) protects your knee joint  AFTER YOU LEAVE:     Medicines:   · Pain medicine: You may be given a prescription medicine to decrease pain  Do not wait until the pain is severe before you take this medicine  We recommend that you take Tylenol 975mg every 8 hours for baseline pain control  Oxycodone can be used as needed, but no more than 1-2 tablets every 4 to 6 hours  · Stool softeners  make it easier for you to have a bowel movement  You may need this medicine to treat or prevent constipation  You will be given Colace 100mg to take twice a day    · Anticoagulants  are a type of blood thinner medicine that helps prevent clots  Clots can cause strokes, heart attacks, and death  These medicines may cause you to bleed or bruise more easily  You will be given aspirin 325 mg to take twice a day for 6 weeks after surgery  ¨ Watch for bleeding from your gums or nose  Watch for blood in your urine and bowel movements  Use a soft washcloth and a soft toothbrush  If you shave, use an electric razor  Avoid activities that can cause bruising or bleeding  · Take your medicine as directed  Call your healthcare provider if you think your medicine is not helping or if you have side effects  Tell him if you are allergic to any medicine  Keep a list of the medicines, vitamins, and herbs you take  Include the amounts, and when and why you take them  Bring the list or the pill bottles to follow-up visits  Carry your medicine list with you in case of an emergency  Follow up with your orthopedist as directed:   You may need to return to have your wound checked and stitches, staples, or drain removed  Write down your questions so you remember to ask them during your visits  Please make an appointment to see Dr Puga Sessions 2 weeks postoperatively  Physical therapy:  A physical therapist teaches you exercises to help improve movement and strength, and to decrease pain  You will begin outpatient physical therapy later this week as planned  Self-care:   · Wound Care:  Please leave the Mepilex dressing in place for 7 days after surgery  After 7 days, you may remove the dressing and leave the incision open to air  Do not get the dressing or the incision wet until your seen in the office  If the incision is uncomfortable under clothing after the Mepilex is removed, you may cover it with a a dry sterile dressing, but should remain dry at all times  · Use ice:  Ice helps decrease swelling and pain  Ice may also help prevent tissue damage  Use an ice pack or put crushed ice in a plastic bag  Cover it with a towel, and place it on your knee for 15 to 20 minutes every hour as directed  · Wear pressure stockings: These are long, tight stockings that put pressure on your legs to promote blood flow and prevent clots  · Use a cane, walker, or crutches as directed: These devices will help decrease your risk of falling  Contact your orthopedist if:  · You have a fever over 101 0°F     · You have trouble moving or bending your joint  · You have increased pain and swelling in your joint, even after you take pain medicine  · You have back pain or lower leg pain when you bend your foot upwards  · You have questions or concerns about your condition or care  Seek immediate care or call 911 if:   · You feel like you are going to faint  · Blood soaks through/saturates your bandage  · Your incision comes apart  · Your incision is red, swollen, or draining pus  · You cannot walk or move your joint, or the limb is numb  · Your arm or leg feels warm, tender, and painful   It may look swollen and red  · You have a seizure or feel confused  · You feel lightheaded, short of breath, and have chest pain  · You have chest pain when you take a deep breath or cough  You may cough up blood  © 2014 [address] is for End User's use only and may not be sold, redistributed or otherwise used for commercial purposes  All illustrations and images included in CareNotes® are the copyrighted property of A D A M , Inc  or Yves Messer  The above information is an  only  It is not intended as medical advice for individual conditions or treatments  Talk to your doctor, nurse or pharmacist before following any medical regimen to see if it is safe and effective for you

## 2021-07-14 NOTE — PROGRESS NOTES
Progress Note - Orthopedics   Bee Foote 66 y o  male MRN: [id number]  Unit/Bed#: 2 South 202 Encounter: [id number]    Assessment:  1) POD#1 s/p Left TKA    Plan:  Ancef 2g IV x 2 for 24 hours postop - completed  DVT prophylaxis: ASA 325mg PO BID/SCD's/Ambulation  PT/OT- WBAT LLE  Analgesia PRN  Follow up AM labs - H/H/BUN/cr stable, DC IVF  Grijalva Dc - monitor for void  Dressing- monitor for drainage, Mepilex may remain in place 7 days  Discharge planning - disposition pending progress with PT postoperatively  Plan for discharge home today to start outpatient physical therapy later this week    Weight bearing: WBAT LLE    VTE Pharmacologic Prophylaxis: ASA 325mg PO BID  VTE Mechanical Prophylaxis: sequential compression device    Subjective:  Patient  Seen and examined in bed this morning with daughter at bedside serving as our   No overnight events  Able to work with physical therapy yesterday and this morning  Pain controlled  Denies chest pain, shortness of breath, dizziness, lightheadedness  Vitals: Blood pressure 136/80, pulse 62, temperature 97 8 °F (36 6 °C), resp  rate 18, height 6' 2" (1 88 m), weight 76 2 kg (168 lb), SpO2 94 %  ,Body mass index is 21 57 kg/m²        Intake/Output Summary (Last 24 hours) at 7/14/2021 0846  Last data filed at 7/14/2021 0600  Gross per 24 hour   Intake 1800 ml   Output 6450 ml   Net -4650 ml       Invasive Devices     Peripheral Intravenous Line            Peripheral IV 04/05/21 Right Wrist 100 days    Peripheral IV 07/13/21 Left Wrist 1 day                Physical Exam: NAD, A&Ox3,  Resting comfortably in bed  Ortho Exam: LLE:  Left anterior knee Dsg c/d/i, compartments soft, calf non-tender, +PF/DF/EHL, +DP/SP/Saph/Sural SILT, DP 2+, foot warm,  Titus's and foot pumps in place    Lab, Imaging and other studies: PO XR L knee:  Reviewed and acceptable    Lab Results   Component Value Date    WBC 9 42 07/14/2021    HGB 11 5 (L) 07/14/2021    HCT 35 5 (L) 07/14/2021    MCV 89 07/14/2021     07/14/2021     Lab Results   Component Value Date    SODIUM 136 07/14/2021    K 3 6 07/14/2021    CL 99 (L) 07/14/2021    CO2 27 07/14/2021    AGAP 10 07/14/2021    BUN 13 07/14/2021    CREATININE 0 86 07/14/2021    GLUC 116 07/14/2021    GLUF 102 (H) 06/14/2021    CALCIUM 8 5 07/14/2021    AST 20 06/14/2021    ALT 23 06/14/2021    ALKPHOS 100 06/14/2021    TP 7 6 06/14/2021    TBILI 0 54 06/14/2021    EGFR 86 07/14/2021     Woodrow Roberts PA-C

## 2021-07-14 NOTE — OCCUPATIONAL THERAPY NOTE
Occupational Therapy Evaluation       07/14/21 1035   Note Type   Note type Evaluation   Restrictions/Precautions   LLE Weight Bearing Per Order WBAT   Other Precautions Fall Risk;Pain  (Primary language Panjabi, understands basic English)   Pain Assessment   Pain Assessment Tool Chicas-Baker FACES   Chicas-Baker FACES Pain Rating 6   Pain Location/Orientation Orientation: Left; Location: Knee   Home Living   Type of [address]   (Bedroom down 1 full FOS)   Bathroom Shower/Tub Walk-in shower   Bathroom Toilet Standard   Bathroom Equipment Other (Comment)  (None)   Home Equipment Walker;Cane   Additional Comments Pt presents for OT eval POD #1 s/p elective L TKA   Prior Function   Level of [place] Independent with ADLs and functional mobility   Lives With Spouse;Daughter;Family   Receives Help From Family   ADL Assistance Independent   IADLs Independent   Comments PTA patient independent in all ADLs and mobility without AD   ADL   Eating Assistance 7  Independent   Grooming Assistance 5  Supervision/Setup   UB Bathing Assistance 5  Supervision/Setup   LB Bathing Assistance 3  Moderate Assistance   UB Dressing Assistance 5  Supervision/Setup   LB Dressing Assistance 3  Moderate [address]  5  Supervision/Setup   Bed Mobility   Supine to Sit 5  Supervision   Transfers   Sit to Stand 3  Moderate assistance   Additional items Assist x 1; Increased time required   Stand to Sit 4  Minimal assistance   Additional items Assist x 1   Functional Mobility   Functional Mobility 4  Minimal assistance   Additional Comments Pt ambulated few feet in room with RW, increased cues for safe RW management   Balance   Static Sitting Good   Dynamic Sitting Fair +   Static Standing Fair +   Dynamic Standing Fair   Activity Tolerance   Activity Tolerance Patient limited by fatigue;Patient limited by pain   RUE Assessment   RUE Assessment WFL   LUE Assessment   LUE Assessment WFL   Cognition   Overall Cognitive Status WFL   Arousal/Participation Alert; Cooperative   Following Commands Follows multistep commands with increased time or repetition   Comments Increased time due to language barrier only   Assessment   Limitation Decreased ADL status; Decreased UE strength;Decreased Safe judgement during ADL;Decreased endurance;Decreased self-care trans;Decreased high-level ADLs   Prognosis Good   Assessment Patient evaluated by Occupational Therapy  Patient admitted with Status post total left knee replacement using cement  The patients occupational profile, medical and therapy history includes a extensive additional review of physical, cognitive, or psychosocial history related to current functional performance  Comorbidities affecting functional mobility and ADLS include: brain hemorrhage, arthritis, HTN, anxiety, HLD, stroke, s/p R TKA 4/2021  Prior to admission, patient was independent with functional mobility without assistive device, independent with ADLS, independent with IADLS and home with family assist   The evaluation identifies the following performance deficits: weakness, impaired balance, decreased endurance, increased fall risk, new onset of impairment of functional mobility, decreased ADLS, decreased IADLS, pain, decreased activity tolerance, decreased safety awareness, ortheopedic restrictions and decreased strength, that result in activity limitations and/or participation restrictions  This evaluation requires clinical decision making of high complexity, because the patient presents with comorbidites that affect occupational performance and required significant modification of tasks or assistance with consideration of multiple treatment options  The Barthel Index was used as a functional outcome tool presenting with a score of 55, indicating marked limitations of functional mobility and ADLS    The patient's raw score on the AM-PAC Daily Activity inpatient short form is 18, standardized score is 38 66, less than 39 4  Patients at this level are likely to benefit from DC to post-acute rehabilitation services  Please refer to the recommendation of the Occupational Therapist for safe DC planning  Despite AM-PAC score, patient with supportive family and will have assist with ADLs/mobility as needed at home, anticipate patient will progress well and quickly towards OT goals of care and be safe to discharge home with family assist  Patient will benefit from skilled Occupational Therapy services to address above deficits and facilitate a safe return to prior level of function  Goals   Patient Goals Go home   STG Time Frame 1-3   Short Term Goal  Goals established to promote patient goal of going home:  Patient will increase standing tolerance to 5 minutes during ADL task to decrease assistance level and decrease fall risk; Patient will increase bed mobility to independent in preparation for ADLS and transfers; Patient will increase functional mobility to and from bathroom with rolling walker with supervision to increase performance with ADLS and to use a toilet; Patient will tolerate 5 minutes of UE ROM/strengthening to increase general activity tolerance and performance in ADLS/IADLS; Patient will improve functional activity tolerance to 5 minutes of sustained functional tasks to increase participation in basic self-care and decrease assistance level;  Patient will be able to to verbalize understanding and perform energy conservation/proper body mechanics during ADLS and functional mobility at least 75% of the time with minimal cueing to decrease signs of fatigue and increase stamina to return to prior level of function; Patient will increase dynamic sitting balance to good to improve the ability to sit at edge of bed or on a chair for ADLS;  Patient will increase dynamic standing balance to fair+ to improve postural stability and decrease fall risk during standing ADLS and transfers     LTG Time Frame 3-7 Long Term Goal Patient will increase standing tolerance to 10 minutes during ADL task to decrease assistance level and decrease fall risk; Patient will increase functional mobility to and from bathroom with rolling walker independently to increase performance with ADLS and to use a toilet; Patient will tolerate 10 minutes of UE ROM/strengthening to increase general activity tolerance and performance in ADLS/IADLS; Patient will improve functional activity tolerance to 10 minutes of sustained functional tasks to increase participation in basic self-care and decrease assistance level;  Patient will be able to to verbalize understanding and perform energy conservation/proper body mechanics during ADLS and functional mobility at least 90% of the time with no cueing to decrease signs of fatigue and increase stamina to return to prior level of function; Patient will increase static/dynamic standing balance to good to improve postural stability and decrease fall risk during standing ADLS and transfers  Pt will score >/= 24/24 on AM-PAC Daily Activity Inpatient scale to promote safe independence with ADLs and functional mobility; Pt will score >/= 100/100 on Barthel Index in order to decrease caregiver assistance needed and increase ability to perform ADLs and functional mobility  Functional Transfer Goals   Pt Will Perform All Functional Transfers   (STG min assist LTG independent)   ADL Goals   Pt Will Perform Grooming   (LTG independent)   Pt Will Perform Bathing   (STG min assist, LTG supervision)   Pt Will Perform UE Dressing   (LTG Independent)   Pt Will Perform LE Dressing   (STG min assist, LTG independent)   Pt Will Perform Toileting   (LTG independent)   Plan   Treatment Interventions ADL retraining;Functional transfer training;UE strengthening/ROM; Endurance training;Patient/family training;Equipment evaluation/education; Compensatory technique education;Continued evaluation; Energy conservation; Activityengagement   Goal Expiration Date 07/28/21   OT Frequency 5x/wk   Additional Treatment Session   Start Time 1025   End Time 1035   Treatment Assessment S: "I am good" O: Patient performed dressing task while seated EOB  Patient able to don/doff bilateral socks with supervision  Don/doff bilateral sneakers with min assist  [address] [place] pants with overall min assist: assist to thread over BLEs, assist to stand and pull pants up over hips  Patient then ambulated short household distance to/from bathroom with RW and min assist, min VCs for safe RW management  Ambulatory transfer to/from standard height toilet with RW and mod assist  Increased assist to stand from low surface due to increased knee pain  Once back at bed, stand to sit with min assist, sit to supine with supervision  A: Pt limited by L knee pain, impulsive at times and requires increased cues for safety awareness/RW management  Anticipate with improved pain tolerance patient will require significantly less assist for functional transfers/mobility  At end of session patient supine in bed with all needs met  P: Return home with family assist for ADLs/mobility as needed; patient with planned outpatient PT to start later this week      Recommendation   OT Discharge Recommendation No rehabilitation needs  (Home w/family assist, outpatient PT)   AM-PAC Daily Activity Inpatient   Lower Body Dressing 2   Bathing 2   Toileting 3   Upper Body Dressing 3   Grooming 4   Eating 4   Daily Activity Raw Score 18   Daily Activity Standardized Score (Calc for Raw Score >=11) 38 66   AM-PAC Applied Cognition Inpatient   Following a Speech/Presentation 4   Understanding Ordinary Conversation 4   Taking Medications 4   Remembering Where Things Are Placed or Put Away 4   Remembering List of 4-5 Errands 4   Taking Care of Complicated Tasks 4   Applied Cognition Raw Score 24   Applied Cognition Standardized Score 62 21   Barthel Index   Feeding 10   Bathing 0 Grooming Score 0   Dressing Score 5   Bladder Score 10   Bowels Score 10   Toilet Use Score 5   Transfers (Bed/Chair) Score 10   Mobility (Level Surface) Score 0   Stairs Score 5   Barthel Index Score 54   Licensure   [place] License Number  Geovanna Edna, OTR/L [id number]

## 2021-07-14 NOTE — PHYSICAL THERAPY NOTE
PT TREATMENT     07/14/21 1340   Note Type   Note Type BID visit/treatment   Pain Assessment   Pain Assessment Tool Pain Assessment not indicated - pt denies pain   Restrictions/Precautions   LLE Weight Bearing Per Order WBAT   Other Precautions Pain; Fall Risk  (Primary language Panjabi, understands basic [place])   General   Chart Reviewed Yes   Cognition   Arousal/Participation Cooperative   Following Commands Follows one step commands without difficulty   Subjective   Subjective "doing better today"   Bed Mobility   Supine to Sit 7  Independent   Sit to Supine 7  Independent   Transfers   Sit to Stand 4  Minimal assistance   Stand to Sit 4  Minimal assistance   Stand pivot 4  Minimal assistance   Additional items   (with walker)   Ambulation/Elevation   Gait pattern   (mildly ataxic gait)   Gait Assistance 4  Minimal assist   Assistive Device Rolling walker   Distance 3x100 feet   Stair Management Assistance 5  Supervision   Additional items Verbal cues   Stair Management Technique Two rails   Number of Stairs 4   Balance   Static Sitting Good   Dynamic Sitting Fair +   Static Standing Fair   Dynamic Standing Fair   Ambulatory Fair -   Exercises   TKR   (5-90 L knee AAROM)   Neuro re-ed x 10 each ankle pumps, quad set, AAROM then AROM LAQ, seated knee flexion, AAROM SLR with verbal and tactile cues for TKE  ice to knee after treatment   Assessment   Prognosis Good   Problem List Decreased strength;Decreased range of motion; Impaired balance;Decreased mobility;Pain   Assessment Pt demonstrates improving L knee ROM/strength and improving quality of gait and function with each PT session 1 day s/p L TKA  Pt is able to negotiate level surfaces with a walker and go up and down steps with 2 rails with supervison/min assist   Pt's gait is mildly ataxic and pt is impulsive at times  Daughter aware  Pt is ready to go home from the PT point of view with assist by family      The patient's AM-PAC Basic Mobility Inpatient Short Form Raw Score is 18, Standardized Score is 41 05  A standardized score greater than 42 9 suggests the patient may benefit from discharge to home  Plan   Treatment/Interventions ADL retraining;Functional transfer training;LE strengthening/ROM; Elevations; Therapeutic exercise;Gait training;Bed mobility; Equipment eval/education;Patient/family training   Progress Progressing toward goals   PT Frequency Twice a day   Recommendation   PT Discharge Recommendation Home with outpatient rehabilitation   Equipment Recommended   (pt has a walker, no need for cane as pt has B railings)   AM-PAC Basic Mobility Inpatient   Turning in Bed Without Bedrails 4   Lying on Back to Sitting on Edge of Flat Bed 4   Moving Bed to Chair 3   Standing Up From Chair 3   Walk in Room 3   Climb 3-5 Stairs 3   Basic Mobility Inpatient Raw Score 20   Basic Mobility Standardized Score 39 97   Licensure   [place] License Number  Dinesh HURLEY [id number]

## 2021-07-14 NOTE — PROGRESS NOTES
Pastoral Care Progress Note    2021  Patient: Chilango Saldivar : [birth year]  Admission Date & Time: 2021 05  MRN: [id number] [facility]: [id number]                     Chaplaincy Interventions Utilized:      21   Clinical Encounter Type   Visited With Patient   Routine Visit Introduction   Referral To   (census/rounds)        21   Clinical Encounter Type   Visited With Patient   Routine Visit Introduction   Referral To   (census/rounds)        21   Clinical Encounter Type   Visited With Patient   Routine Visit Introduction   Referral To   (census/rounds)   Relationship Building: Cultivated a relationship of care and support  Patient was being visited by his son and grandson and was in for a knee replacement  There was somewhat of a language barrier, so the visit was fairly brief, but the family was very welcoming and gracious    The patient said that the knee replacement went well        21   Clinical Encounter Type   Visited With Patient   Routine Visit Introduction   Referral To   (census/rounds)

## 2021-07-14 NOTE — PLAN OF CARE
Problem: MOBILITY - ADULT  Goal: Maintain or return to baseline ADL function  Description: INTERVENTIONS:  -  Assess patient's ability to carry out ADLs; assess patient's baseline for ADL function and identify physical deficits which impact ability to perform ADLs (bathing, care of mouth/teeth, toileting, grooming, dressing, etc )  - Assess/evaluate cause of self-care deficits   - Assess range of motion  - Assess patient's mobility; develop plan if impaired  - Assess patient's need for assistive devices and provide as appropriate  - Encourage maximum independence but intervene and supervise when necessary  - Involve family in performance of ADLs  - Assess for home care needs following discharge   - Consider OT consult to assist with ADL evaluation and planning for discharge  - Provide patient education as appropriate  Outcome: Progressing     Problem: MOBILITY - ADULT  Goal: Maintains/Returns to pre admission functional level  Description: INTERVENTIONS:  - Perform BMAT or MOVE assessment daily    - Set and communicate daily mobility goal to care team and patient/family/caregiver     - Collaborate with rehabilitation services on mobility goals if consulted  - Out of bed for toileting  - Record patient progress and toleration of activity level   Outcome: Progressing

## 2021-07-14 NOTE — PHYSICAL THERAPY NOTE
PT TREATMENT     07/14/21 0855   Note Type   Note Type BID visit/treatment   Pain Assessment   Pain Assessment Tool 0-10   Pain Score 3   Pain Location/Orientation   (L knee)   Restrictions/Precautions   LLE Weight Bearing Per Order WBAT   Other Precautions Pain; Fall Risk  (speaks some english, first language is panjabi)   Cognition   Overall Cognitive Status WFL   Subjective   Subjective "doing good"   Bed Mobility   Supine to Sit 5  Supervision   Sit to Supine 5  Supervision   Transfers   Sit to Stand 3  Moderate assistance progressing to min assist with practice   Additional items Increased time required;Verbal cues for safety and technique   Stand to Sit 4  Minimal assistance   Stand pivot 4  Minimal assistance   Additional items   (with walker, verbal cues for safety and technique)   Ambulation/Elevation   Gait pattern Wide CHUCK  (short step length both improved some with practice   Gait Assistance 4  Minimal assist   Assistive Device Rolling walker   Distance 2x75 feet   Stair Management Assistance 4  Minimal assist   Additional items Tactile cues; Verbal cues   Stair Management Technique Two rails   Number of Stairs 4   Balance   Static Sitting Good   Dynamic Sitting Fair +   Static Standing Fair +   Dynamic Standing Fair   Ambulatory Fair   Exercises   TKR   (AAROM 5-90 L knee)   Neuro re-ed x 10 each ankle pumps, quad set, AAROM then AROM LAQ, seated knee flexion, AAROM SLR with verbal and tactile cues for TKE   (ice to knee after treatment)   Assessment   Prognosis Good   Problem List Decreased strength;Decreased range of motion; Impaired balance;Decreased mobility;Pain   Assessment Patient demonstrates good progress towards all goals postop day 1 status post left total knee replacement  Patient demonstrates ability to ambulate on level surfaces with a rolling walker with minimal assist and go up and down the steps with 2 rails with supervision    Patient is impulsive at times which is made more difficult by language barrier  Overall patient is doing well and will be safe to go home with his daughter after today's 2nd PT session  The patient's AM-PAC Basic Mobility Inpatient Short Form Raw Score is 18, Standardized Score is 41 05  A standardized score less than 42 9 suggests the patient may benefit from discharge to post-acute rehabilitation services, however pt will be safe to go home with his daughter and OP PT  Plan   Treatment/Interventions ADL retraining;Functional transfer training;LE strengthening/ROM; Elevations; Therapeutic exercise;Gait training;Bed mobility; Equipment eval/education;Patient/family training   Progress Progressing toward goals   PT Frequency Twice a day   Recommendation   PT Discharge Recommendation Home with outpatient rehabilitation   AM-PAC Basic Mobility Inpatient   Turning in Bed Without Bedrails 4   Lying on Back to Sitting on Edge of Flat Bed 4   Moving Bed to Chair 2   Standing Up From Chair 2   Walk in Room 3   Climb 3-5 Stairs 3   Basic Mobility Inpatient Raw Score 18   Basic Mobility Standardized Score 17 94   Licensure   [place] License Number  Montiel  [id number]

## 2021-07-15 ENCOUNTER — TELEPHONE (OUTPATIENT)
Dept: OBGYN CLINIC | Facility: HOSPITAL | Age: 71
End: 2021-07-15

## 2021-07-15 ENCOUNTER — TRANSITIONAL CARE MANAGEMENT (OUTPATIENT)
Dept: FAMILY MEDICINE CLINIC | Facility: CLINIC | Age: 71
End: 2021-07-15

## 2021-07-15 RX ORDER — ASPIRIN 325 MG
TABLET, DELAYED RELEASE (ENTERIC COATED) ORAL
COMMUNITY
Start: 2021-07-14 | End: 2021-09-08

## 2021-07-15 RX ORDER — LANOLIN ALCOHOL/MO/W.PET/CERES
CREAM (GRAM) TOPICAL
COMMUNITY
Start: 2021-07-12

## 2021-07-15 RX ORDER — GLUCOSAMINE/D3/HYALURONIC ACID 1000MG-25
TABLET ORAL
COMMUNITY
Start: 2021-07-12 | End: 2021-09-08 | Stop reason: SDUPTHER

## 2021-07-15 RX ORDER — FERROUS SULFATE 325(65) MG
TABLET ORAL
COMMUNITY
Start: 2021-07-12 | End: 2021-09-08 | Stop reason: SDUPTHER

## 2021-07-15 NOTE — TELEPHONE ENCOUNTER
Pts daughter contacted to assist in completing patients postoperative follow up call assessment  VM left requesting call back

## 2021-07-15 NOTE — UTILIZATION REVIEW
Notification of Discharge   This is a Notification of Discharge from our facility [address]  Please be advised that this patient has been discharge from our facility  Below you will find the admission and discharge date and time including the patients disposition  UTILIZATION REVIEW CONTACT:  Candice Chen  Utilization   Network Utilization Review Department  Phone: [phone] x carefully listen to the prompts  All voicemails are confidential   Email: [email or web address]     PHYSICIAN ADVISORY SERVICES:  FOR [id number]-IPUO REVIEW - MEDICAL NECESSITY DENIAL  Phone: [phone]  Fax: [phone]  Email: [email or web address]     PRESENTATION DATE: 7/13/2021  5:57 AM  OBERVATION ADMISSION DATE:   INPATIENT ADMISSION DATE: 7/13/21  9:50 AM   DISCHARGE DATE: 7/14/2021  7:33 PM  DISPOSITION: Home/Self Care Home/Self Care      IMPORTANT INFORMATION:  Send all requests for admission clinical reviews, approved or denied determinations and any other requests to dedicated fax number below belonging to the campus where the patient is receiving treatment   List of dedicated fax numbers:  [address] DENIALS (Administrative/Medical Necessity) [phone]   [address] [place] [address] (Maternity/NICU/Pediatrics) [phone]   John Meyers [phone]   Kaye Rivero [phone]   Mariahsa Fidelina [phone]   [place] [phone]   [facility]  [phone]   [address] [facility] [address]  [address] [facility] [address] [facility] [phone]

## 2021-07-16 ENCOUNTER — EVALUATION (OUTPATIENT)
Dept: PHYSICAL THERAPY | Facility: CLINIC | Age: 71
End: 2021-07-16
Payer: COMMERCIAL

## 2021-07-16 ENCOUNTER — TELEPHONE (OUTPATIENT)
Dept: OBGYN CLINIC | Facility: HOSPITAL | Age: 71
End: 2021-07-16

## 2021-07-16 DIAGNOSIS — G89.29 CHRONIC PAIN OF LEFT KNEE: ICD-10-CM

## 2021-07-16 DIAGNOSIS — M25.562 CHRONIC PAIN OF LEFT KNEE: ICD-10-CM

## 2021-07-16 DIAGNOSIS — M17.12 PRIMARY OSTEOARTHRITIS OF LEFT KNEE: Primary | ICD-10-CM

## 2021-07-16 PROCEDURE — 97161 PT EVAL LOW COMPLEX 20 MIN: CPT | Performed by: PHYSICAL THERAPIST

## 2021-07-16 NOTE — TELEPHONE ENCOUNTER
Cait Luis, patients daughter contacted to complete a postoperative follow up call assessment and check in on the patient  VM left requesting a callback

## 2021-07-16 NOTE — PROGRESS NOTES
PT Evaluation     Today's date: 2021  Patient name: Zuly De Jesus  :   MRN: [id number]  Referring provider: Yoselin Hernandez  Dx:   Encounter Diagnosis     ICD-10-CM    1  Primary osteoarthritis of left knee  M17 12 Ambulatory referral to Physical Therapy   2  Chronic pain of left knee  M25 562 Ambulatory referral to Physical Therapy    G89 29                   Assessment  Assessment details: Zuly De Jesus is a 66 y o  male who presents to physical therapy with pain, decreased LE range of motion, decreased LE strength, fair balance, impaired function and decreased activity tolerance  Patient's clinical presentation is consistent with their referring diagnosis of Primary osteoarthritis of left knee  (primary encounter diagnosis)  Chronic pain of left knee  The pt presents with functional limitations of ADLs, recreational activities, ambulation, performing household chores and stair negotiation  Pt would benefit from physical therapy services to address these limitations and maximize function  Pt was instructed and educated on home exercise program today and demonstrates understanding  Impairments: abnormal gait, abnormal muscle firing, abnormal muscle tone, abnormal or restricted ROM, abnormal movement, activity intolerance, impaired balance, impaired physical strength, lacks appropriate home exercise program, pain with function, weight-bearing intolerance, poor posture  and poor body mechanics  Understanding of Dx/Px/POC: good   Prognosis: good    Goals  Short term goals  (5 weeks)  1  Patient will have no pain left knee  2   Patient will have full range of motion left knee  3  Patient will report a 50% improvement with activities of daily living   4  Patient will decrease girth of left knee by 1 to 2 cm  Long term goals - (10 weeks)  1  Patient will be independent with home exercise program  2  Patient will have no gait deviations ambulating on level surfaces     3   Patient will report 80 % improvement with activity of daily living function  4   Patient will negotiate stairs up and down pain free with use of one railing  Plan  Patient would benefit from: PT eval and skilled physical therapy  Planned modality interventions: cryotherapy  Planned therapy interventions: ADL training, balance/weight bearing training, joint mobilization, manual therapy, massage, neuromuscular re-education, patient education, postural training, strengthening, stretching, functional ROM exercises, therapeutic activities, therapeutic exercise, gait training and home exercise program  Frequency: 2x week  Duration in visits: 20  Duration in weeks: 10  Treatment plan discussed with: patient        Subjective Evaluation    History of Present Illness  Mechanism of injury: He had left knee pain for 2 or 3 years  He followed up with Dr Candy Rogers  He underwent a Left TKA on Tuesday  He stayed in the hospital one night  He was referred to PT  Pain  Current pain rating: 3  At best pain ratin  At worst pain ratin  Quality: sharp and dull ache  Relieving factors: rest, ice and medications  Aggravating factors: walking, standing and stair climbing    Social Support    Employment status: not working  Exercise history: Walking    Patient Goals  Patient goals for therapy: decreased pain and independence with ADLs/IADLs  Patient's goals regarding treatment: More comfortable walking  Objective     Palpation   Left   Hypertonic in the distal biceps femoris, distal semimembranosus, distal semitendinosus and rectus femoris       Active Range of Motion   Left Knee   Flexion: 107 degrees   Extension: -5 degrees     Right Knee   Flexion: 126 degrees   Extension: 0 degrees     Mobility   Patellar Mobility:   Left Knee   Hypomobile: left medial, left lateral, left superior and left inferior    Strength/Myotome Testing     Left Knee   Flexion: 4-  Extension: 4-    Right Knee   Flexion: 4+  Extension: 4+    Swelling Left Knee Girth Measurement (cm)   Joint line: 39 4 cm    Right Knee Girth Measurement (cm)   Joint line: 38 3 cm    Ambulation   Weight-Bearing Status   Assistive device used: front-wheeled walker    Observational Gait   Gait: antalgic              Precautions: - Hypertension, Anemia, CVA 2005    Specialty Daily Treatment Diary     Manuals 7/16/21       Visit # 1       PF mobs        Stretch HS Quad        Knee PROM                Warm-up        NuStep        Neuro Re-Ed        Qset 20       Wt shift                                Ther Ex        Mini squat        Side step        Knee flex        Knee ext 20       SLR 10       Heel slide 20                       Ther Activity                        Gait Training        W/ rw                Modalities        CP 5'

## 2021-07-20 ENCOUNTER — OFFICE VISIT (OUTPATIENT)
Dept: PHYSICAL THERAPY | Facility: CLINIC | Age: 71
End: 2021-07-20
Payer: COMMERCIAL

## 2021-07-20 DIAGNOSIS — G89.29 CHRONIC PAIN OF LEFT KNEE: ICD-10-CM

## 2021-07-20 DIAGNOSIS — M17.12 PRIMARY OSTEOARTHRITIS OF LEFT KNEE: Primary | ICD-10-CM

## 2021-07-20 DIAGNOSIS — M25.562 CHRONIC PAIN OF LEFT KNEE: ICD-10-CM

## 2021-07-20 PROCEDURE — 97110 THERAPEUTIC EXERCISES: CPT

## 2021-07-20 NOTE — PROGRESS NOTES
Daily Note     Today's date: 2021  Patient name: Balbina Holt  :   MRN: [id number]  Referring provider: Dennie Pulse  Dx:   Encounter Diagnosis     ICD-10-CM    1  Primary osteoarthritis of left knee  M17 12    2  Chronic pain of left knee  M25 562     G89 29        Start Time: 1355  Stop Time: 1445  Total time in clinic (min): 50 minutes    Subjective: Pt reports 3/10 "When I stand up"      Objective: See treatment diary below  Dressing removed by PT, Jorge Quinonez      Assessment: Tolerated treatment well  Patient exhibited good technique with therapeutic exercises and would benefit from continued PT  Pt demo good tolerance with exercises, able to progress as able      Plan: Continue per plan of care        Precautions: - Hypertension, Anemia, CVA 2005    Specialty Daily Treatment Diary     Manuals 21      Visit # 1 2      PF mobs  5'      Stretch HS Quad  5'      Knee PROM  5'              Warm-up        NuStep  10' pre      Neuro Re-Ed        Qset 20 20      Wt shift                                Ther Ex        Mini squat        Side step        Knee flex        Knee ext 20 20      SLR 10 20      Heel slide 20 Peanut 20x      Hip Add  20x              Ther Activity                        Gait Training        W/ rw                Modalities        CP 5' 5' post

## 2021-07-21 ENCOUNTER — OFFICE VISIT (OUTPATIENT)
Dept: PHYSICAL THERAPY | Facility: CLINIC | Age: 71
End: 2021-07-21
Payer: COMMERCIAL

## 2021-07-21 DIAGNOSIS — M17.12 PRIMARY OSTEOARTHRITIS OF LEFT KNEE: Primary | ICD-10-CM

## 2021-07-21 DIAGNOSIS — M25.562 CHRONIC PAIN OF LEFT KNEE: ICD-10-CM

## 2021-07-21 DIAGNOSIS — G89.29 CHRONIC PAIN OF LEFT KNEE: ICD-10-CM

## 2021-07-21 PROCEDURE — 97110 THERAPEUTIC EXERCISES: CPT

## 2021-07-21 NOTE — PROGRESS NOTES
Daily Note     Today's date: 2021  Patient name: Abdoul Caldwell  :   MRN: [id number]  Referring provider: Alie Oden  Dx:   Encounter Diagnosis     ICD-10-CM    1  Primary osteoarthritis of left knee  M17 12    2  Chronic pain of left knee  M25 562     G89 29        Start Time: 1230  Stop Time: 1315  Total time in clinic (min): 45 minutes    Subjective: Pt reports 3/10       Objective: See treatment diary below  Assessment: Tolerated treatment well  Patient exhibited good technique with therapeutic exercises and would benefit from continued PT  Pt demo good tolerance with exercises, able to progress as able      Plan: Continue per plan of care        Precautions: - Hypertension, Anemia, CVA 2005    Specialty Daily Treatment Diary     Manuals 21     Visit # 1 2 3     PF mobs  5' 2'     Stretch HS Quad  5' 3'     Knee PROM  5' 5'     L knee ROM   5-118     Warm-up        NuStep  10' pre 10'     Neuro Re-Ed        Qset 20 20 20     Wt shift   NV                             Ther Ex        Mini squat        Side step   10ftx4     Knee flex   NV     Knee ext 20 20 20     SLR 10 20 20     Heel slide 20 Peanut 20x Peanut 20x     Hip Add  20x 20x     Lunge str on step   20     Ther Activity                        Gait Training        W/ rw                Modalities        CP 5' 5' post 5' post

## 2021-07-21 NOTE — TELEPHONE ENCOUNTER
Patient contacted for a postoperative follow up assessement  Spoke with patient's daughter, Jean-Pierre Sood who reports patient is experiencing 3/10 pain when sitting and while walking with the RW  Patient has been attending outpatient PT and had an appointment today 7/21  Patient is taking Tylenol 975mg PRN, Oxycodone 5mg PRN and  BID  Patient has not needed to take Colace since he is having BMs and is passing gas  Patient denies increase in swelling and incision is now open to air  Patient denies nausea, vomiting, abdominal pain, chest pain, shortness of breath, fever, dizziness and calf pain  Patient's daughter confirmed post-op appointment with surgeon on Thursday 7/29 at 11:30 AM  Patient's daughter does not have any other questions or conerns at this time

## 2021-07-23 DIAGNOSIS — M17.12 PRIMARY OSTEOARTHRITIS OF LEFT KNEE: Primary | ICD-10-CM

## 2021-07-26 ENCOUNTER — OFFICE VISIT (OUTPATIENT)
Dept: PHYSICAL THERAPY | Facility: CLINIC | Age: 71
End: 2021-07-26
Payer: COMMERCIAL

## 2021-07-26 DIAGNOSIS — M17.12 PRIMARY OSTEOARTHRITIS OF LEFT KNEE: Primary | ICD-10-CM

## 2021-07-26 DIAGNOSIS — G89.29 CHRONIC PAIN OF LEFT KNEE: ICD-10-CM

## 2021-07-26 DIAGNOSIS — M25.562 CHRONIC PAIN OF LEFT KNEE: ICD-10-CM

## 2021-07-26 PROCEDURE — 97140 MANUAL THERAPY 1/> REGIONS: CPT | Performed by: PHYSICAL THERAPIST

## 2021-07-26 PROCEDURE — 97110 THERAPEUTIC EXERCISES: CPT | Performed by: PHYSICAL THERAPIST

## 2021-07-26 NOTE — PROGRESS NOTES
Daily Note     Today's date: 2021  Patient name: Marcela Elizondo  :   MRN: [id number]  Referring provider: Alcides Lloyd  Dx:   Encounter Diagnosis     ICD-10-CM    1  Primary osteoarthritis of left knee  M17 12    2  Chronic pain of left knee  M25 562     G89 29                   Subjective: Patient reports no pain coming into today's session  Patient says the hardest activity for him is getting up from a seated position  Objective: See treatment diary below  Assessment: Tolerated treatment well  Patient has excellent ROM and minimal pain  Patient has a good quad contraction and tolerated mini squats and weight shifts without adverse effects today  Patients incision is healing well with one bandaid at the distal end  Swelling is still evident  Patient exhibited good technique with therapeutic exercises and would benefit from continued PT  Plan: Continue per plan of care        Precautions: - Hypertension, Anemia, CVA 2005    Specialty Daily Treatment Diary     Manuals 21    Visit # 1 2 3 4    PF mobs  5' 2' 2'    Stretch HS Quad  5' 3' Quad STM 3'    Knee PROM  5' 5' 5'    L knee ROM   5-118     Warm-up        NuStep  10' pre 10' 10' L2    Neuro Re-Ed        Qset 20 20 20 20x    Wt shift   NV 10x3"            Ther Ex        Mini squat    20x    Side step   10ftx4 10ftx 4    Knee flex   NV 20x    Knee ext 20 20 20 20x    SLR 10 20 20 2x10    Heel slide 20 Peanut 20x Peanut 20x 30x    Hip Add  20x 20x 20x    Lunge str on step   20 20x    Leg Press 55#    NV    Gait Training        W/ rw    NV with SPC            Modalities        CP 5' 5' post 5' post 5' post

## 2021-07-27 ENCOUNTER — OFFICE VISIT (OUTPATIENT)
Dept: PHYSICAL THERAPY | Facility: CLINIC | Age: 71
End: 2021-07-27
Payer: COMMERCIAL

## 2021-07-27 DIAGNOSIS — M17.12 PRIMARY OSTEOARTHRITIS OF LEFT KNEE: Primary | ICD-10-CM

## 2021-07-27 DIAGNOSIS — G89.29 CHRONIC PAIN OF LEFT KNEE: ICD-10-CM

## 2021-07-27 DIAGNOSIS — M25.562 CHRONIC PAIN OF LEFT KNEE: ICD-10-CM

## 2021-07-27 PROCEDURE — 97110 THERAPEUTIC EXERCISES: CPT

## 2021-07-27 NOTE — PROGRESS NOTES
Daily Note     Today's date: 2021  Patient name: Reema Henderson  :   MRN: [id number]  Referring provider: Suman Ponce  Dx:   Encounter Diagnosis     ICD-10-CM    1  Primary osteoarthritis of left knee  M17 12    2  Chronic pain of left knee  M25 562     G89 29        Start Time: 272  Stop Time: 1615  Total time in clinic (min): 30 minutes    Subjective: Patient reports no pain  Objective: See treatment diary below  Pt arrived 15 min late to therapy  Not all exercises performed due to time constraints      Assessment: Tolerated treatment well  Patient performed exercises with minimal complaints  Patient exhibited good technique with therapeutic exercises and would benefit from continued PT  Plan: Continue per plan of care        Precautions: - Hypertension, Anemia, CVA     Specialty Daily Treatment Diary     Manuals 21   Visit # 1 2 3 4 5   PF mobs  5' 2' 2'    Stretch HS Quad  5' 3' Quad STM 3'    Knee PROM  5' 5' 5'    L knee ROM   5-118     Warm-up        NuStep  10' pre 10' 10' L2 5' L2   Neuro Re-Ed        Qset 20 20 20 20x    Wt shift   NV 10x3"            Ther Ex        Mini squat    20x 20x   Side step   10ftx4 10ftx 4 --   Knee flex   NV 20x    Knee ext 20 20 20 20x    SLR 10 20 20 2x10 Standing 35x ea   Heel slide 20 Peanut 20x Peanut 20x 30x --   Hip Add  20x 20x 20x --   Lunge str on step   20 20x 35x   Leg Press 55#    NV Total gym L20 30x   Gait Training        W/ rw    NV with SPC            Modalities        CP 5' 5' post 5' post 5' post

## 2021-07-29 ENCOUNTER — OFFICE VISIT (OUTPATIENT)
Dept: OBGYN CLINIC | Facility: CLINIC | Age: 71
End: 2021-07-29

## 2021-07-29 ENCOUNTER — APPOINTMENT (OUTPATIENT)
Dept: RADIOLOGY | Facility: CLINIC | Age: 71
End: 2021-07-29
Payer: COMMERCIAL

## 2021-07-29 VITALS — HEART RATE: 66 BPM | SYSTOLIC BLOOD PRESSURE: 116 MMHG | DIASTOLIC BLOOD PRESSURE: 70 MMHG

## 2021-07-29 DIAGNOSIS — Z47.1 AFTERCARE FOLLOWING LEFT KNEE JOINT REPLACEMENT SURGERY: ICD-10-CM

## 2021-07-29 DIAGNOSIS — Z96.652 STATUS POST TOTAL KNEE REPLACEMENT USING CEMENT, LEFT: Primary | ICD-10-CM

## 2021-07-29 DIAGNOSIS — M17.12 PRIMARY OSTEOARTHRITIS OF LEFT KNEE: ICD-10-CM

## 2021-07-29 DIAGNOSIS — Z96.652 AFTERCARE FOLLOWING LEFT KNEE JOINT REPLACEMENT SURGERY: ICD-10-CM

## 2021-07-29 PROCEDURE — 73562 X-RAY EXAM OF KNEE 3: CPT

## 2021-07-29 PROCEDURE — 99024 POSTOP FOLLOW-UP VISIT: CPT | Performed by: ORTHOPAEDIC SURGERY

## 2021-07-29 NOTE — PROGRESS NOTES
Assessment/Plan:  1  Status post total knee replacement using cement, left  XR knee 3 vw left non injury   2  Aftercare following left knee joint replacement surgery       Joanne Michelle  Is a very pleasant 38-year-old gentleman presenting today 2 weeks after undergoing left total knee arthroplasty  He is doing very well at this time  His prosthesis is stable on imaging and exam   His incision is healing nicely and he may now get it wet in the shower  but should avoid direct scrubbing or saturation  He will continue with aspirin for DVT prophylaxis for the next 4 weeks  Would also like him to continue with his efforts at physical therapy as he has already done tremendously well regaining his range of motion and will continue to improve strength  We will plan to see him back in 4 weeks for a re-evaluation  He will not need x-rays at that time  All of his questions were addressed today    Subjective:  2 weeks status post left total knee arthroplasty    Patient ID: Hossein Guevara is a 66 y o  male  Joanne Michelle is a pleasant 38-year-old gentleman presenting 2 weeks after the aforementioned surgery  His grandson is here to help interpret  He reports that he is doing well  He has been participating with physical therapy in taking his aspirin DVT prophylaxis  He has kept the incision dry  He denies any significant pain  Review of Systems   Constitutional: Positive for activity change  HENT: Negative  Eyes: Negative  Respiratory: Negative  Cardiovascular: Negative  Gastrointestinal: Negative  Endocrine: Negative  Genitourinary: Negative  Musculoskeletal: Positive for arthralgias, joint swelling and myalgias  Skin: Negative  Allergic/Immunologic: Negative  Neurological: Negative  Hematological: Negative  Psychiatric/Behavioral: Negative        Past Medical History:   Diagnosis Date    Anxiety     Arthritis     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 ([facility] [address] )     Hyperlipidemia     Hypertension  Stroke [facility]) 2005       Past Surgical History:   Procedure Laterality Date    HERNIA REPAIR      [place] TOTAL KNEE ARTHROPLASTY Right 4/5/2021    Procedure: ARTHROPLASTY KNEE TOTAL;  Surgeon: Radha Pan DO;  Location: [address] [place] [address];  Service: Orthopedics    [place] TOTAL KNEE ARTHROPLASTY Left 7/13/2021    Procedure: ARTHROPLASTY KNEE TOTAL;  Surgeon: Radha Pan DO;  [facility];  Service: Orthopedics       Family History   Problem Relation Age of Onset    Stroke Mother     Stroke Father        Social History     Occupational History    Not on file   Tobacco Use    Smoking status: Never Smoker    Smokeless tobacco: Never Used   Vaping Use    Vaping Use: Never used   Substance and Sexual Activity    Alcohol use: Never    Drug use: Never    Sexual activity: Not Currently         Current Outpatient Medications:     acetaminophen (TYLENOL) 325 mg tablet, Take 3 tablets (975 mg total) by mouth every 8 (eight) hours, Disp: , Rfl: 0    amLODIPine (NORVASC) 5 mg tablet, Take 1 tablet (5 mg total) by mouth 2 (two) times a day, Disp: 180 tablet, Rfl: 3    Ascorbic Acid (Vitamin C ER) 500 MG CPCR, , Disp: , Rfl:     aspirin (ECOTRIN) 325 mg EC tablet, , Disp: , Rfl:     aspirin 325 mg tablet, Take 1 tablet (325 mg total) by mouth 2 (two) times a day, Disp: 84 tablet, Rfl: 0    atorvastatin (LIPITOR) 10 mg tablet, Take 1 tablet (10 mg total) by mouth daily with dinner, Disp: 90 tablet, Rfl: 3    docusate sodium (COLACE) 100 mg capsule, Take 1 capsule (100 mg total) by mouth 2 (two) times a day, Disp: 60 capsule, Rfl: 0    escitalopram (LEXAPRO) 5 mg tablet, Take 1 tablet (5 mg total) by mouth daily at bedtime, Disp: 90 tablet, Rfl: 3    FeroSul 325 (65 Fe) MG tablet, , Disp: , Rfl:     Glucosamine Plus Vitamin D3 8365-6847-1 65 MG-UNIT-MG TABS, , Disp: , Rfl:     labetalol (NORMODYNE) 200 mg tablet, Take 1 tablet (200 mg total) by mouth every 12 (twelve) hours, Disp: 90 tablet, Rfl: 3   losartan (COZAAR) 50 mg tablet, Take 1 tablet (50 mg total) by mouth 2 (two) times a day, Disp: 90 tablet, Rfl: 3    oxyCODONE (ROXICODONE) 5 mg immediate release tablet, Take 1-2 tablets by mouth every 4-6 hours as needed for pain, Disp: 60 tablet, Rfl: 0    No Known Allergies    Objective:  Vitals:    07/29/21 1127   BP: 116/70   Pulse: 66       There is no height or weight on file to calculate BMI  Left Knee Exam     Muscle Strength   The patient has normal left knee strength  Tenderness   The patient is experiencing tenderness in the medial retinaculum and lateral retinaculum  Range of Motion   Extension:  0 normal   Flexion:  110 abnormal     Tests   Varus: negative Valgus: negative  Drawer:  Anterior - negative       Patellar apprehension: negative    Other   Erythema: absent  Scars: present  Sensation: normal  Pulse: present  Swelling: none  Effusion: no effusion present    Comments:   Anterior incision well approximated with no signs of erythema, warmth, drainage, or dehiscence  No sign of infection   Prosthesis stable at 0, 30, 90   Thigh and calf soft nontender   Ambulates a slightly antalgic gait on the left and the use of single-point cane  Grossly distally neurovascularly unchanged          Observations   Left Knee   Negative for effusion  Physical Exam  Vitals and nursing note reviewed  Constitutional:       Appearance: He is well-developed  Comments: There is no height or weight on file to calculate BMI  HENT:      Head: Normocephalic and atraumatic  Right Ear: External ear normal       Left Ear: External ear normal    Cardiovascular:      Rate and Rhythm: Normal rate  Pulmonary:      Effort: Pulmonary effort is normal    Abdominal:      Palpations: Abdomen is soft  Musculoskeletal:      Cervical back: Normal range of motion  Left knee: No effusion  Comments: See ortho exam   Skin:     General: Skin is warm and dry     Neurological:      Mental Status: He is alert and oriented to person, place, and time  Psychiatric:         Behavior: Behavior normal          Thought Content: Thought content normal          Judgment: Judgment normal        I have personally reviewed pertinent films in PACS   Of the x-rays taken today of his left knee compared them to previous postoperative films    The total knee prosthesis remains well aligned and well cemented with no signs of loosening, periprosthetic fracture, or other interval complication

## 2021-07-30 ENCOUNTER — OFFICE VISIT (OUTPATIENT)
Dept: PHYSICAL THERAPY | Facility: CLINIC | Age: 71
End: 2021-07-30
Payer: COMMERCIAL

## 2021-07-30 DIAGNOSIS — G89.29 CHRONIC PAIN OF LEFT KNEE: ICD-10-CM

## 2021-07-30 DIAGNOSIS — M25.562 CHRONIC PAIN OF LEFT KNEE: ICD-10-CM

## 2021-07-30 DIAGNOSIS — M17.12 PRIMARY OSTEOARTHRITIS OF LEFT KNEE: Primary | ICD-10-CM

## 2021-07-30 PROCEDURE — 97110 THERAPEUTIC EXERCISES: CPT

## 2021-07-30 NOTE — PROGRESS NOTES
Daily Note     Today's date: 2021  Patient name: Yanelis Chinchilla  :   MRN: [id number]  Referring provider: Anthony Jerez  Dx:   Encounter Diagnosis     ICD-10-CM    1  Primary osteoarthritis of left knee  M17 12    2  Chronic pain of left knee  M25 562     G89 29        Start Time: 1220  Stop Time: 1320  Total time in clinic (min): 60 minutes    Subjective: Patient reports no pain at the start of today's session  Objective: See treatment diary below      Assessment: Tolerated treatment well  Patient with improving gait sequencing, especially step length during today's session  Provided pt w/ graded vc when ambulating w/o AD to improve step length, donna well  Pt w/ mild quad lag during SLR  Pt will continue to benefit from skilled PT to improve functional mobility and activity tolerance  Plan: Continue per plan of care        Precautions: - Hypertension, Anemia, CVA 2005    Specialty Daily Treatment Diary     Manuals 21   Visit # 6 2 3 4 5   PF mobs  5' 2' 2'    Stretch HS Quad  5' 3' Quad STM 3'    Knee PROM  5' 5' 5'    L knee ROM 3-118  5-118     Warm-up        NuStep 10' 10' pre 10' 10' L2 5' L2   Neuro Re-Ed        Qset 5" x15 20 20 20x    Wt shift   NV 10x3"            Ther Ex        Mini squat x30   20x 20x   Side step 10' x5  10ftx4 10ftx 4 --   Knee flex   NV 20x    Knee ext SAQ 3# x30  LAQ 3# x30 20 20 20x    SLR Supine x30 20 20 2x10 Standing 35x ea   Heel slide Peanut x30 Peanut 20x Peanut 20x 30x --   Hip Add  20x 20x 20x --   Lunge str on step 10" x10  20 20x 35x   Leg Press 55# Total gym x30   NV Total gym L20 30x   Gait Training        W/ rw W/ SPC x5'  W/o AD x5'   NV with [facility]            Modalities        CP 10' post 5' post 5' post 5' post

## 2021-08-02 ENCOUNTER — OFFICE VISIT (OUTPATIENT)
Dept: PHYSICAL THERAPY | Facility: CLINIC | Age: 71
End: 2021-08-02
Payer: COMMERCIAL

## 2021-08-02 DIAGNOSIS — M17.12 PRIMARY OSTEOARTHRITIS OF LEFT KNEE: Primary | ICD-10-CM

## 2021-08-02 DIAGNOSIS — M25.562 CHRONIC PAIN OF LEFT KNEE: ICD-10-CM

## 2021-08-02 DIAGNOSIS — G89.29 CHRONIC PAIN OF LEFT KNEE: ICD-10-CM

## 2021-08-02 PROCEDURE — 97110 THERAPEUTIC EXERCISES: CPT | Performed by: PHYSICAL THERAPIST

## 2021-08-02 PROCEDURE — 97140 MANUAL THERAPY 1/> REGIONS: CPT | Performed by: PHYSICAL THERAPIST

## 2021-08-02 NOTE — PROGRESS NOTES
Daily Note     Today's date: 2021  Patient name: Darcie Jennings  :   MRN: [id number]  Referring provider: Radha Greenberg  Dx:   Encounter Diagnosis     ICD-10-CM    1  Primary osteoarthritis of left knee  M17 12    2  Chronic pain of left knee  M25 562     G89 29                   Subjective: Patient reports no pain left knee      Objective: See treatment diary below      Assessment: Tolerated treatment well  His knee ROM is progressing well for both flexion and extension  He has a tendency to move with abrupt start and stops during exercises  He needs VCs to move more controlled  Plan: Continue per plan of care        Precautions: - Hypertension, Anemia, CVA 2005    Specialty Daily Treatment Diary     Manuals 21   Visit # 2 3 4 5 6   PF mobs 5' 2' 2'  2'   Stretch HS Quad 5' 3' Quad STM 3'  3' quad STM   Knee PROM 5' 5' 5'  5'   L knee ROM  5-118      Warm-up        NuStep 10' pre 10' 10' L2 5' L2 10'   Neuro Re-Ed        Qset 20 20 20x  30   Wt shift  NV 10x3"  --           Ther Ex        Mini squat   20x 20x 20   Side step  10ftx4 10ftx 4 -- 10ft x 4   Knee flex  NV 20x     Knee ext 20 20 20x  30   2#   SLR 20 20 2x10 Standing 35x ea 30 supine   Heel slide Peanut 20x Peanut 20x 30x -- 30   Hip Add 20x 20x 20x --    Lunge str on step  20 20x 35x 30   Leg Press 55#   NV Total gym L20 30x LP  30  65#   Gait Training        W/ rw   NV with SPC             Modalities        CP 5' post 5' post 5' post

## 2021-08-03 ENCOUNTER — OFFICE VISIT (OUTPATIENT)
Dept: PHYSICAL THERAPY | Facility: CLINIC | Age: 71
End: 2021-08-03
Payer: COMMERCIAL

## 2021-08-03 DIAGNOSIS — G89.29 CHRONIC PAIN OF LEFT KNEE: ICD-10-CM

## 2021-08-03 DIAGNOSIS — M25.562 CHRONIC PAIN OF LEFT KNEE: ICD-10-CM

## 2021-08-03 DIAGNOSIS — M17.12 PRIMARY OSTEOARTHRITIS OF LEFT KNEE: Primary | ICD-10-CM

## 2021-08-03 PROCEDURE — 97140 MANUAL THERAPY 1/> REGIONS: CPT

## 2021-08-03 PROCEDURE — 97112 NEUROMUSCULAR REEDUCATION: CPT

## 2021-08-03 PROCEDURE — 97110 THERAPEUTIC EXERCISES: CPT

## 2021-08-03 NOTE — PROGRESS NOTES
Daily Note     Today's date: 8/3/2021  Patient name: Tammy Ponce  : [birth year]  MRN: [id number]  Referring provider: Vinh Rolon  Dx:   Encounter Diagnosis     ICD-10-CM    1  Primary osteoarthritis of left knee  M17 12    2  Chronic pain of left knee  M25 562     G89 29        Start Time: 1177  Stop Time: 1230  Total time in clinic (min): 45 minutes    Subjective: Patient reports no pain left knee      Objective: See treatment diary below      Assessment: Tolerated treatment well  Pt required cues for proper knee flexion during squats as he hinges at hips      Plan: Continue per plan of care        Precautions: - Hypertension, Anemia, CVA 2005    Specialty Daily Treatment Diary     Manuals 7/21/21 7/26/21 7/27/21 8/2/21 8/3/21   Visit # 3 4 5 6 7   PF mobs 2' 2'  2' 2'   Stretch HS Quad 3' Quad STM 3'  3' quad STM 3' quad roller   Knee PROM 5' 5'  5' 5'   L knee ROM 5-118       Warm-up        NuStep 10' 10' L2 5' L2 10' 10'   Neuro Re-Ed        Qset 20 20x  30 30   Wt shift NV 10x3"  --            Ther Ex        Mini squat  20x 20x 20 20   Side step 10ftx4 10ftx 4 -- 10ft x 4 5x10ft   Knee flex NV 20x      Knee ext 20 20x  30   2# 30 2#   SLR 20 2x10 Standing 35x ea 30 supine 30 supine   Heel slide Peanut 20x 30x -- 30 30   Hip Add 20x 20x --  20x standing   Lunge str on step 20 20x 35x 30 30   Leg Press 55#  NV Total gym L20 30x LP  30  65# LP 30 75#   Gait Training        W/ rw  NV with SPC              Modalities        CP 5' post 5' post   5'

## 2021-08-06 ENCOUNTER — OFFICE VISIT (OUTPATIENT)
Dept: PHYSICAL THERAPY | Facility: CLINIC | Age: 71
End: 2021-08-06
Payer: COMMERCIAL

## 2021-08-06 DIAGNOSIS — M25.562 CHRONIC PAIN OF LEFT KNEE: ICD-10-CM

## 2021-08-06 DIAGNOSIS — M17.12 PRIMARY OSTEOARTHRITIS OF LEFT KNEE: Primary | ICD-10-CM

## 2021-08-06 DIAGNOSIS — G89.29 CHRONIC PAIN OF LEFT KNEE: ICD-10-CM

## 2021-08-06 PROCEDURE — 97140 MANUAL THERAPY 1/> REGIONS: CPT | Performed by: PHYSICAL THERAPIST

## 2021-08-06 PROCEDURE — 97110 THERAPEUTIC EXERCISES: CPT | Performed by: PHYSICAL THERAPIST

## 2021-08-06 NOTE — PROGRESS NOTES
Daily Note     Today's date: 2021  Patient name: Hossein Guevara  :   MRN: [id number]  Referring provider: Jaky Gomez  Dx:   Encounter Diagnosis     ICD-10-CM    1  Primary osteoarthritis of left knee  M17 12    2  Chronic pain of left knee  M25 562     G89 29                   Subjective: Patient reports no pain left knee      Objective: See treatment diary below  Left knee AROM -2 to 121 degrees  Assessment: Tolerated treatment well  His ROM of left knee is making regular gains  He would benefit from further strengthening / functional training  Plan: Continue per plan of care        Precautions: - Hypertension, Anemia, CVA 2005    Specialty Daily Treatment Diary     Manuals 7/26/21 7/27/21 8/2/21 8/3/21 8/6/21   Visit # 4 5 6 7 8   PF mobs 2'  2' 2' 2'   Stretch HS Quad Quad STM 3'  3' quad STM 3' quad roller 3'   Knee PROM 5'  5' 5' 5'   L knee ROM        Warm-up        NuStep 10' L2 5' L2 10' 10' 10'   Neuro Re-Ed        Qset 20x  30 30 30   Wt shift 10x3"  --             Ther Ex        Mini squat 20x 20x 20 20 20   Side step 10ftx 4 -- 10ft x 4 5x10ft 5 x 20 ft   Knee flex 20x       Knee ext 20x  30   2# 30 2# 30   SLR 2x10 Standing 35x ea 30 supine 30 supine 30   Heel slide 30x -- 30 30 50   Hip Add 20x --  20x standing 20   Lunge str on step 20x 35x 30 30 30   Leg Press 55# NV Total gym L20 30x LP  30  65# LP 30 75# 30  75#   Gait Training        W/ rw NV with SPC               Modalities        CP 5' post   5' 5'

## 2021-08-09 ENCOUNTER — OFFICE VISIT (OUTPATIENT)
Dept: PHYSICAL THERAPY | Facility: CLINIC | Age: 71
End: 2021-08-09
Payer: COMMERCIAL

## 2021-08-09 DIAGNOSIS — M17.12 PRIMARY OSTEOARTHRITIS OF LEFT KNEE: Primary | ICD-10-CM

## 2021-08-09 DIAGNOSIS — G89.29 CHRONIC PAIN OF LEFT KNEE: ICD-10-CM

## 2021-08-09 DIAGNOSIS — M25.562 CHRONIC PAIN OF LEFT KNEE: ICD-10-CM

## 2021-08-09 PROCEDURE — 97110 THERAPEUTIC EXERCISES: CPT

## 2021-08-09 PROCEDURE — 97112 NEUROMUSCULAR REEDUCATION: CPT

## 2021-08-09 PROCEDURE — 97140 MANUAL THERAPY 1/> REGIONS: CPT

## 2021-08-09 NOTE — PROGRESS NOTES
Daily Note     Today's date: 2021  Patient name: Esther Doherty  :   MRN: [id number]  Referring provider: Lucia Ruvalcaba  Dx:   Encounter Diagnosis     ICD-10-CM    1  Primary osteoarthritis of left knee  M17 12    2  Chronic pain of left knee  M25 562     G89 29                   Subjective: Patient reports no pain left knee      Objective: See treatment diary below  Left knee AROM -2 to 121 degrees  Assessment: Tolerated treatment well  Pt is progressing nicely overall, he requires cues for proper squat technique to inc knee flexion  Plan: Continue per plan of care        Precautions: - Hypertension, Anemia, CVA 2005    Specialty Daily Treatment Diary     Manuals 7/27/21 8/2/21 8/3/21 8/6/21 8/9/21   Visit # 5 6 7 8 9   PF mobs  2' 2' 2' 2'   Stretch HS Quad  3' quad STM 3' quad roller 3' 3'   Knee PROM  5' 5' 5' 5'   L knee ROM        Warm-up        NuStep 5' L2 10' 10' 10' 10'   Neuro Re-Ed        Qset  30 30 30 30   Wt shift  --              Ther Ex        Mini squat 20x 20 20 20 30   Side step -- 10ft x 4 5x10ft 5 x 20 ft 5x20ft RTB   Knee flex        Knee ext  30   2# 30 2# 30 30   SLR Standing 35x ea 30 supine 30 supine 30 30 2#   Heel slide -- 30 30 50    Hip Abd     Standing 30   Hip Add --  20x standing 20 20   Lunge str on step 35x 30 30 30 30   Leg Press 55# Total gym L20 30x LP  30  65# LP 30 75# 30  75# 30 75#    Gait Training        W/ rw                Modalities        CP   5' 5'

## 2021-08-10 ENCOUNTER — OFFICE VISIT (OUTPATIENT)
Dept: PHYSICAL THERAPY | Facility: CLINIC | Age: 71
End: 2021-08-10
Payer: COMMERCIAL

## 2021-08-10 DIAGNOSIS — M17.12 PRIMARY OSTEOARTHRITIS OF LEFT KNEE: Primary | ICD-10-CM

## 2021-08-10 DIAGNOSIS — M25.562 CHRONIC PAIN OF LEFT KNEE: ICD-10-CM

## 2021-08-10 DIAGNOSIS — G89.29 CHRONIC PAIN OF LEFT KNEE: ICD-10-CM

## 2021-08-10 PROCEDURE — 97112 NEUROMUSCULAR REEDUCATION: CPT

## 2021-08-10 PROCEDURE — 97110 THERAPEUTIC EXERCISES: CPT

## 2021-08-10 NOTE — PROGRESS NOTES
Daily Note     Today's date: 8/10/2021  Patient name: Rema Bran  : [birth year]  MRN: [id number]  Referring provider: Winfred Opitz  Dx:   Encounter Diagnosis     ICD-10-CM    1  Primary osteoarthritis of left knee  M17 12    2  Chronic pain of left knee  M25 562     G89 29        Start Time: 1146  Stop Time: 1230  Total time in clinic (min): 45 minutes    Subjective: Patient reports no pain left knee      Objective: See treatment diary below  Left knee AROM -2 to 121 degrees  Assessment: Tolerated treatment well  Pt able to progress his exercises today and performed with no pain  Plan: Continue per plan of care        Precautions: - Hypertension, Anemia, CVA 2005    Specialty Daily Treatment Diary     Manuals 8/2/21 8/3/21 8/6/21 8/9/21 8/10/21   Visit # 6 7 8 9 10   PF mobs 2' 2' 2' 2' 2'   Stretch HS Quad 3' quad STM 3' quad roller 3' 3' 3'   Knee PROM 5' 5' 5' 5' 5'   L knee ROM        Warm-up        NuStep 10' 10' 10' 10' Upright bike 10'   Neuro Re-Ed        Qset 30 30 30 30 30   Wt shift --               Ther Ex        Mini squat 20 20 20 30 30   Side step 10ft x 4 5x10ft 5 x 20 ft 5x20ft RTB 6x20ft RTB   Knee flex        Knee ext 30   2# 30 2# 30 30 30 2#   SLR 30 supine 30 supine 30 30 2# 30 2#   Heel slide 30 30 50     Hip Abd    Standing 30 Standing 30   Hip Add  20x standing 20 20 20   Lunge str on step 30 30 30 30 30   Standing TKE     CC 7 5# 20x   Leg Press 55# LP  30  65# LP 30 75# 30  75# 30 75#  30 85#   Gait Training        W/ rw                Modalities        CP  5' 5'

## 2021-08-13 ENCOUNTER — EVALUATION (OUTPATIENT)
Dept: PHYSICAL THERAPY | Facility: CLINIC | Age: 71
End: 2021-08-13
Payer: COMMERCIAL

## 2021-08-13 DIAGNOSIS — Z86.73 HISTORY OF HEMORRHAGIC CEREBROVASCULAR ACCIDENT (CVA) WITHOUT RESIDUAL DEFICITS: ICD-10-CM

## 2021-08-13 DIAGNOSIS — G89.29 CHRONIC PAIN OF LEFT KNEE: ICD-10-CM

## 2021-08-13 DIAGNOSIS — M25.562 CHRONIC PAIN OF LEFT KNEE: ICD-10-CM

## 2021-08-13 DIAGNOSIS — I10 ESSENTIAL HYPERTENSION: ICD-10-CM

## 2021-08-13 DIAGNOSIS — F32.4 MAJOR DEPRESSIVE DISORDER IN PARTIAL REMISSION, UNSPECIFIED WHETHER RECURRENT (HCC): ICD-10-CM

## 2021-08-13 DIAGNOSIS — M17.12 PRIMARY OSTEOARTHRITIS OF LEFT KNEE: Primary | ICD-10-CM

## 2021-08-13 PROCEDURE — 97110 THERAPEUTIC EXERCISES: CPT | Performed by: PHYSICAL THERAPIST

## 2021-08-13 PROCEDURE — 97112 NEUROMUSCULAR REEDUCATION: CPT | Performed by: PHYSICAL THERAPIST

## 2021-08-13 NOTE — PROGRESS NOTES
PT Re-evaluation:    Today's date: 2021  Patient name: Esther Doherty  : [birth year]  MRN: [id number]  Referring provider: Lucia Ruvalcaba  Dx:   Encounter Diagnosis     ICD-10-CM    1  Primary osteoarthritis of left knee  M17 12    2  Chronic pain of left knee  M25 562     G89 29        Start Time: 1230  Stop Time: 1320  Total time in clinic (min): 50 minutes    Assessment  Assessment details: Esther Doherty is a 66 y o  male who presents to physical therapy with pain, decreased LE range of motion, decreased LE strength, fair balance, impaired function and decreased activity tolerance  Patient's clinical presentation is consistent with their referring diagnosis of Primary osteoarthritis of left knee  (primary encounter diagnosis)  Chronic pain of left knee  The pt presents with functional limitations of ADLs, recreational activities, ambulation, performing household chores and stair negotiation  Pt would benefit from physical therapy services to address these limitations and maximize function  Pt was instructed and educated on home exercise program today and demonstrates understanding  21: Pt making gains towards PT goals and pleased with progress  Progress pt as per primary PT  Impairments: abnormal gait, abnormal muscle firing, abnormal muscle tone, abnormal or restricted ROM, abnormal movement, activity intolerance, impaired balance, impaired physical strength, lacks appropriate home exercise program, pain with function, weight-bearing intolerance, poor posture  and poor body mechanics  Understanding of Dx/Px/POC: good   Prognosis: good    Goals  Short term goals  (5 weeks) (STGS met as of 21)  1  Patient will have no pain left knee  2   Patient will have full range of motion left knee  3  Patient will report a 50% improvement with activities of daily living   4  Patient will decrease girth of left knee by 1 to 2 cm       Long term goals - (10 weeks) (pt progressing towards LTGs as of 21)  1  Patient will be independent with home exercise program  2  Patient will have no gait deviations ambulating on level surfaces  3   Patient will report 80 % improvement with activity of daily living function  4   Patient will negotiate stairs up and down pain free with use of one railing  Plan  Patient would benefit from: PT eval and skilled physical therapy  Planned modality interventions: cryotherapy  Planned therapy interventions: ADL training, balance/weight bearing training, joint mobilization, manual therapy, massage, neuromuscular re-education, patient education, postural training, strengthening, stretching, functional ROM exercises, therapeutic activities, therapeutic exercise, gait training and home exercise program  Frequency: 2x week  Duration in weeks: 6  Treatment plan discussed with: patient        Subjective Evaluation    History of Present Illness  Mechanism of injury: He had left knee pain for 2 or 3 years  He followed up with Dr Edu Hirsch  He underwent a Left TKA on Tuesday  He stayed in the hospital one night  He was referred to PT      AS of 21: Pt reporting increased mobility at home  Pt ambulating with SC  Pt pleased with progress  No reports of pain L knee  Quality of life: good    Pain  Current pain ratin  At best pain ratin  At worst pain rating: 3  Quality: sharp and dull ache  Relieving factors: rest, ice and medications  Aggravating factors: walking, standing and stair climbing    Social Support    Employment status: not working  Exercise history: Walking    Patient Goals  Patient goals for therapy: decreased pain and independence with ADLs/IADLs  Patient's goals regarding treatment: More comfortable walking          Objective     Neurological Testing     Sensation     Knee   Left Knee   Intact: light touch    Right Knee   Intact: light touch     Active Range of Motion   Left Knee   Flexion: 120 degrees   Extension: 0 degrees     Right Knee   Flexion: 126 degrees   Extension: 0 degrees     Strength/Myotome Testing     Left Knee   Flexion: 4  Extension: 4    Right Knee   Flexion: 4+  Extension: 4+    Swelling     Left Knee Girth Measurement (cm)   Joint line: 38 4 cm    Right Knee Girth Measurement (cm)   Joint line: 38 3 cm    Ambulation   Weight-Bearing Status   Assistive device used: front-wheeled walker    Observational Gait   Gait: antalgic              Precautions: - Hypertension, Anemia, CVA 2005    Specialty Daily Treatment Diary     Manuals 8/13/21 re-eval 8/3/21 8/6/21 8/9/21 8/10/21   Visit # 11 7 8 9 10   PF mobs  2' 2' 2' 2'   Stretch HS Quad  3' quad roller 3' 3' 3'   Knee PROM  5' 5' 5' 5'   L knee ROM 0-120 degrees L knee AROM       Warm-up        NuStep 11' nu step 10' 10' 10' Upright bike 10'   Neuro Re-Ed        Qset 30 30 30 30 30   Wt shift --               Ther Ex        Mini squat 30 20 20 30 30   Side step 20ft x6 5x10ft 5 x 20 ft 5x20ft RTB 6x20ft RTB   Knee flex        Knee ext 30   2# 30 2# 30 30 30 2#   SLR 30 supine 2# 30 supine 30 30 2# 30 2#   Heel slide Total gym level 19 x20 30 50     Hip Abd Standing x30   Standing 30 Standing 30   Hip Add  20x standing 20 20 20   Lunge str on step 40 30 30 30 30   Standing TKE CC 7 5#  20x    CC 7 5# 20x   Leg Press 55# LP  30  95# LP 30 75# 30  75# 30 75#  30 85#   Gait Training        W/ rw                Modalities        CP 5' L knee 5' 5'

## 2021-08-13 NOTE — TELEPHONE ENCOUNTER
[address] left message for patient requesting early refill for vacation override for:    Amlodipine  Escitalopram  Atorvastatin  Losartan  Labetalol    Last seen 7/12/21    thanks

## 2021-08-15 RX ORDER — LABETALOL 200 MG/1
200 TABLET, FILM COATED ORAL EVERY 12 HOURS SCHEDULED
Qty: 90 TABLET | Refills: 3 | Status: SHIPPED | OUTPATIENT
Start: 2021-08-15 | End: 2021-09-08 | Stop reason: SDUPTHER

## 2021-08-15 RX ORDER — AMLODIPINE BESYLATE 5 MG/1
5 TABLET ORAL 2 TIMES DAILY
Qty: 180 TABLET | Refills: 3 | Status: SHIPPED | OUTPATIENT
Start: 2021-08-15 | End: 2021-09-08 | Stop reason: SDUPTHER

## 2021-08-15 RX ORDER — LOSARTAN POTASSIUM 50 MG/1
50 TABLET ORAL 2 TIMES DAILY
Qty: 90 TABLET | Refills: 3 | Status: SHIPPED | OUTPATIENT
Start: 2021-08-15 | End: 2021-09-08 | Stop reason: SDUPTHER

## 2021-08-15 RX ORDER — ESCITALOPRAM OXALATE 5 MG/1
5 TABLET ORAL
Qty: 90 TABLET | Refills: 3 | Status: SHIPPED | OUTPATIENT
Start: 2021-08-15 | End: 2021-09-08 | Stop reason: SDUPTHER

## 2021-08-15 RX ORDER — ATORVASTATIN CALCIUM 10 MG/1
10 TABLET, FILM COATED ORAL
Qty: 90 TABLET | Refills: 3 | Status: SHIPPED | OUTPATIENT
Start: 2021-08-15 | End: 2021-09-08 | Stop reason: SDUPTHER

## 2021-08-16 ENCOUNTER — OFFICE VISIT (OUTPATIENT)
Dept: PHYSICAL THERAPY | Facility: CLINIC | Age: 71
End: 2021-08-16
Payer: COMMERCIAL

## 2021-08-16 DIAGNOSIS — M25.562 CHRONIC PAIN OF LEFT KNEE: ICD-10-CM

## 2021-08-16 DIAGNOSIS — G89.29 CHRONIC PAIN OF LEFT KNEE: ICD-10-CM

## 2021-08-16 DIAGNOSIS — M17.12 PRIMARY OSTEOARTHRITIS OF LEFT KNEE: Primary | ICD-10-CM

## 2021-08-16 PROCEDURE — 97140 MANUAL THERAPY 1/> REGIONS: CPT

## 2021-08-16 PROCEDURE — 97110 THERAPEUTIC EXERCISES: CPT

## 2021-08-16 PROCEDURE — 97112 NEUROMUSCULAR REEDUCATION: CPT

## 2021-08-16 NOTE — PROGRESS NOTES
Daily Note     Today's date: 2021  Patient name: Yanelis Chinchilla  : [birth year]  MRN: [id number]  Referring provider: Anthony Jerez  Dx:   Encounter Diagnosis     ICD-10-CM    1  Primary osteoarthritis of left knee  M17 12    2  Chronic pain of left knee  M25 562     G89 29        Start Time: 1230  Stop Time: 1315  Total time in clinic (min): 45 minutes    Subjective: Pt reports 'Im good" He reports "No pain"      Objective: See treatment diary below      Assessment: Tolerated treatment well  Patient exhibited good technique with therapeutic exercises and would benefit from continued PT  Held squats today due to inc hip hinge and dec knee flexion  Demo good imrpovement with gait as he improved with step length  Plan: Continue per plan of care        Precautions: - Hypertension, Anemia, CVA 2005    Specialty Daily Treatment Diary     Manuals 21 re-eval 8/16/21 8/6/21 8/9/21 8/10/21   Visit # 11 12 8 9 10   PF mobs   2' 2' 2'   Stretch HS Quad   3' 3' 3'   Knee PROM   5' 5' 5'   L knee ROM 0-120 degrees L knee AROM 0-120 degrees      Warm-up        NuStep 11' nu step 10' bike 10' 10' Upright bike 10'   Neuro Re-Ed        Qset 30  30 30 30   Wt shift --               Ther Ex        Mini squat 30 -- 20 30 30   Side step 20ft x6 RTB 20ft x6 5 x 20 ft 5x20ft RTB 6x20ft RTB   Knee flex        Knee ext 30   2# 50 3# 30 30 30 2#   SLR 30 supine 2# 30 supine 2# 30 30 2# 30 2#   Heel slide Total gym level 19 x20 TG L20 x45 50     Hip Abd Standing x30 Standing 2#  Standing 30 Standing 30   Hip Add   20 20 20   Lunge str on step 40 30 30 30 30   Standing TKE CC 7 5#  20x CC 7 5# 20x   CC 7 5# 20x   Leg Press 55# LP  30  95# LP 40  30  75# 30 75#  30 85#   Gait Training        W/ rw                Modalities        CP 5' L knee 5' L knee 5'

## 2021-08-17 ENCOUNTER — OFFICE VISIT (OUTPATIENT)
Dept: PHYSICAL THERAPY | Facility: CLINIC | Age: 71
End: 2021-08-17
Payer: COMMERCIAL

## 2021-08-17 DIAGNOSIS — G89.29 CHRONIC PAIN OF LEFT KNEE: ICD-10-CM

## 2021-08-17 DIAGNOSIS — M25.562 CHRONIC PAIN OF LEFT KNEE: ICD-10-CM

## 2021-08-17 DIAGNOSIS — M17.12 PRIMARY OSTEOARTHRITIS OF LEFT KNEE: Primary | ICD-10-CM

## 2021-08-17 PROCEDURE — 97110 THERAPEUTIC EXERCISES: CPT

## 2021-08-17 PROCEDURE — 97112 NEUROMUSCULAR REEDUCATION: CPT

## 2021-08-17 NOTE — PROGRESS NOTES
Daily Note     Today's date: 2021  Patient name: Shoaib Rojas  :   MRN: [id number]  Referring provider: Yolie Cantu  Dx:   Encounter Diagnosis     ICD-10-CM    1  Primary osteoarthritis of left knee  M17 12    2  Chronic pain of left knee  M25 562     G89 29        Start Time: 1555  Stop Time: 1230  Total time in clinic (min): 45 minutes    Subjective: Pt reports no complaints prior to session  Objective: See treatment diary below      Assessment: Tolerated treatment well  Patient exhibited good technique with therapeutic exercises and would benefit from continued PT  Demo improvement with exercises today, pt very motivated  Plan: Continue per plan of care        Precautions: - Hypertension, Anemia, CVA     Specialty Daily Treatment Diary     Manuals 21 re-eval 8/16/21 8/17/21 8/9/21 8/10/21   Visit # 11 12 8 9 10   PF mobs    2' 2'   Stretch HS Quad    3' 3'   Knee PROM    5' 5'   L knee ROM 0-120 degrees L knee AROM 0-120 degrees 0-120 deg     Warm-up        NuStep 11' nu step 10' bike 10' upright 10' Upright bike 10'   Neuro Re-Ed        Qset 30   30 30   Wt shift --               Ther Ex        Mini squat 30 --  30 30   Side step 20ft x6 RTB 20ft x6 5 x 20 ft 5x20ft RTB 6x20ft RTB   Knee flex        Knee ext 30   2# 50 3# 30 30 30 2#   SLR 30 supine 2# 30 supine 2# 30 2# 30 2# 30 2#   Heel slide Total gym level 19 x20 TG L20 x45 TG L20 x45     Hip Abd Standing x30 Standing 2# Standing 2# Standing 30 Standing 30   Hip Add   20 20 20   Lunge str on step 40 30 30 30 30   Standing TKE CC 7 5#  20x CC 7 5# 20x   CC 7 5# 20x   Leg Press 55# LP  30  95# LP 40  30  95# 30 75#  30 85#   Gait Training        W/ rw                Modalities        CP 5' L knee 5' L knee 5'

## 2021-08-20 ENCOUNTER — OFFICE VISIT (OUTPATIENT)
Dept: PHYSICAL THERAPY | Facility: CLINIC | Age: 71
End: 2021-08-20
Payer: COMMERCIAL

## 2021-08-20 DIAGNOSIS — M25.562 CHRONIC PAIN OF LEFT KNEE: ICD-10-CM

## 2021-08-20 DIAGNOSIS — M17.12 PRIMARY OSTEOARTHRITIS OF LEFT KNEE: Primary | ICD-10-CM

## 2021-08-20 DIAGNOSIS — G89.29 CHRONIC PAIN OF LEFT KNEE: ICD-10-CM

## 2021-08-20 PROCEDURE — 97140 MANUAL THERAPY 1/> REGIONS: CPT | Performed by: PHYSICAL THERAPIST

## 2021-08-20 PROCEDURE — 97110 THERAPEUTIC EXERCISES: CPT | Performed by: PHYSICAL THERAPIST

## 2021-08-20 NOTE — PROGRESS NOTES
Daily Note     Today's date: 2021  Patient name: Nacho Lomas  :   MRN: [id number]  Referring provider: Elisa Dias  Dx:   Encounter Diagnosis     ICD-10-CM    1  Primary osteoarthritis of left knee  M17 12    2  Chronic pain of left knee  M25 562     G89 29                   Subjective: Pt reports no pain left knee      Objective: See treatment diary below      Assessment: Tolerated treatment well  Patient exhibited good technique with therapeutic exercises and would benefit from continued PT  His left knee ROM is making steady improvements  Plan: Continue per plan of care        Precautions: - Hypertension, Anemia, CVA 2005    Specialty Daily Treatment Diary     Manuals 21 re-eval 21    Visit # 11 12  14    PF mobs        Stretch HS Quad        Knee PROM        L knee ROM 0-120 degrees L knee AROM 0-120 degrees 0-120 deg 0 to 123°    Warm-up        NuStep 11' nu step 10' bike 10' upright 10' upright    Neuro Re-Ed        Qset 30       Wt shift --               Ther Ex        Mini squat 30 --      Side step 20ft x6 RTB 20ft x6 5 x 20 ft 6 x 20 ft    Knee flex        Knee ext 30   2# 50 3# 30 30   3#    SLR 30 supine 2# 30 supine 2# 30 2# 30   2#    Heel slide Total gym level 19 x20 TG L20 x45 TG L20 x45 TG L 20  50    Hip Abd Standing x30 Standing 2# Standing 2# 20  2#    Hip Add   20     Lunge str on step 40 30 30 30    Standing TKE CC 7 5#  20x CC 7 5# 20x  Step up 20  6"    Leg Press 55# LP  30  95# LP 40  30  95# 30   95#    Gait Training                        Modalities        CP 5' L knee 5' L knee 5' 5'

## 2021-08-23 ENCOUNTER — OFFICE VISIT (OUTPATIENT)
Dept: PHYSICAL THERAPY | Facility: CLINIC | Age: 71
End: 2021-08-23
Payer: COMMERCIAL

## 2021-08-23 DIAGNOSIS — M25.562 CHRONIC PAIN OF LEFT KNEE: ICD-10-CM

## 2021-08-23 DIAGNOSIS — G89.29 CHRONIC PAIN OF LEFT KNEE: ICD-10-CM

## 2021-08-23 DIAGNOSIS — M17.12 PRIMARY OSTEOARTHRITIS OF LEFT KNEE: Primary | ICD-10-CM

## 2021-08-23 PROCEDURE — 97110 THERAPEUTIC EXERCISES: CPT | Performed by: PHYSICAL THERAPIST

## 2021-08-23 PROCEDURE — 97140 MANUAL THERAPY 1/> REGIONS: CPT | Performed by: PHYSICAL THERAPIST

## 2021-08-23 NOTE — PROGRESS NOTES
Daily Note     Today's date: 2021  Patient name: David Hendrix  :   MRN: [id number]  Referring provider: Augusto Jimenes  Dx:   Encounter Diagnosis     ICD-10-CM    1  Primary osteoarthritis of left knee  M17 12    2  Chronic pain of left knee  M25 562     G89 29                   Subjective: Pt reports no pain left knee      Objective: See treatment diary below      Assessment: Tolerated treatment well  Patient exhibited good technique with therapeutic exercises and would benefit from continued PT  He has god squat form while using the TG and is much safer on the machine than trying to do body weight squat where he leans too far backward  Plan: Continue per plan of care        Precautions: - Hypertension, Anemia, CVA     Specialty Daily Treatment Diary     Manuals 21 re-eval 21   Visit # 11 12  14 15   PF mobs     2'   Stretch HS Quad     4'   Knee PROM     4'   L knee ROM 0-120 degrees L knee AROM 0-120 degrees 0-120 deg 0 to 123° 0 to 122 degees   Warm-up        NuStep 11' nu step 10' bike 10' upright 10' upright 10' upright   Neuro Re-Ed        Qset 30       Wt shift --               Ther Ex        Mini squat 30 --   TG L 20   40   Side step 20ft x6 RTB 20ft x6 5 x 20 ft 6 x 20 ft 8 x 20 ft   Knee flex     40   3#   Knee ext 30   2# 50 3# 30 30   3# 40   3#   SLR 30 supine 2# 30 supine 2# 30 2# 30   2# 30   3#   Heel slide Total gym level 19 x20 TG L20 x45 TG L20 x45 TG L 20  50 Heel slide on pnut  40   Hip Abd Standing x30 Standing 2# Standing 2# 20  2# 30   3#   Hip Add   20  ---   Lunge str on step 40 30 30 30 30   Standing TKE CC 7 5#  20x CC 7 5# 20x  Step up 20  6" 30  6"   Leg Press 55# LP  30  95# LP 40  30  95# 30   95# 30   95#   Gait Training                        Modalities        CP 5' L knee 5' L knee 5' 5' 5'

## 2021-08-25 ENCOUNTER — OFFICE VISIT (OUTPATIENT)
Dept: PHYSICAL THERAPY | Facility: CLINIC | Age: 71
End: 2021-08-25
Payer: COMMERCIAL

## 2021-08-25 DIAGNOSIS — M17.12 PRIMARY OSTEOARTHRITIS OF LEFT KNEE: Primary | ICD-10-CM

## 2021-08-25 DIAGNOSIS — G89.29 CHRONIC PAIN OF LEFT KNEE: ICD-10-CM

## 2021-08-25 DIAGNOSIS — M25.562 CHRONIC PAIN OF LEFT KNEE: ICD-10-CM

## 2021-08-25 PROCEDURE — 97140 MANUAL THERAPY 1/> REGIONS: CPT | Performed by: PHYSICAL THERAPIST

## 2021-08-25 PROCEDURE — 97110 THERAPEUTIC EXERCISES: CPT | Performed by: PHYSICAL THERAPIST

## 2021-08-25 NOTE — PROGRESS NOTES
Daily Note     Today's date: 2021  Patient name: Blank Hassan  :   MRN: [id number]  Referring provider: Cristal Yates  Dx:   Encounter Diagnosis     ICD-10-CM    1  Primary osteoarthritis of left knee  M17 12    2  Chronic pain of left knee  M25 562     G89 29                   Subjective:  No pain left knee      Objective: See treatment diary below      Assessment: Tolerated treatment well  Flexion ROM to 122 degrees today actively  [place] needs cues at times      Plan: Continue per plan of care        Precautions: - Hypertension, Anemia, CVA 2005    Specialty Daily Treatment Diary     Manuals 21   Visit # 12  14 15 16   PF mobs    2' 2'   Stretch HS Quad    4' 4'   Knee PROM    4' 4'   L knee ROM 0-120 degrees 0-120 deg 0 to 123° 0 to 122 degees 122 degrees flex   Warm-up        NuStep 10' bike 10' upright 10' upright 10' upright 10' bike   Neuro Re-Ed        Qset        Wt shift                Ther Ex        Mini squat --   TG L 20   40 TG L 20   40   Side step RTB 20ft x6 5 x 20 ft 6 x 20 ft 8 x 20 ft 8 x 20 ft   Knee flex    40   3# 40   3#   Knee ext 50 3# 30 30   3# 40   3# 40   3#   SLR 30 supine 2# 30 2# 30   2# 30   3# 40   3#   Heel slide TG L20 x45 TG L20 x45 TG L 20  50 Heel slide on pnut  40 Heel slide on pnut  40   Hip Abd Standing 2# Standing 2# 20  2# 30   3# 40   3#   Hip Add  20  ---    Lunge str on step 30 30 30 30 30   Standing TKE CC 7 5# 20x  Step up 20  6" 30  6" 30  6"   Leg Press 55# LP 40  30  95# 30   95# 30   95# 30   95#   Gait Training                        Modalities        CP 5' L knee 5' 5' 5' 5'

## 2021-08-26 ENCOUNTER — OFFICE VISIT (OUTPATIENT)
Dept: OBGYN CLINIC | Facility: CLINIC | Age: 71
End: 2021-08-26

## 2021-08-26 ENCOUNTER — APPOINTMENT (OUTPATIENT)
Dept: PHYSICAL THERAPY | Facility: CLINIC | Age: 71
End: 2021-08-26
Payer: COMMERCIAL

## 2021-08-26 VITALS
HEIGHT: 74 IN | BODY MASS INDEX: 22.18 KG/M2 | DIASTOLIC BLOOD PRESSURE: 70 MMHG | SYSTOLIC BLOOD PRESSURE: 130 MMHG | WEIGHT: 172.8 LBS | HEART RATE: 67 BPM

## 2021-08-26 DIAGNOSIS — Z96.652 AFTERCARE FOLLOWING LEFT KNEE JOINT REPLACEMENT SURGERY: ICD-10-CM

## 2021-08-26 DIAGNOSIS — Z47.1 AFTERCARE FOLLOWING LEFT KNEE JOINT REPLACEMENT SURGERY: ICD-10-CM

## 2021-08-26 DIAGNOSIS — Z96.652 STATUS POST TOTAL KNEE REPLACEMENT USING CEMENT, LEFT: Primary | ICD-10-CM

## 2021-08-26 PROCEDURE — 99024 POSTOP FOLLOW-UP VISIT: CPT | Performed by: ORTHOPAEDIC SURGERY

## 2021-08-26 NOTE — PROGRESS NOTES
Assessment/Plan:  1  Status post total knee replacement using cement, left     2  Aftercare following left knee joint replacement surgery        patient is a very pleasant 77-year-old gentleman presenting today for follow-up 6 weeks after undergoing a left total knee arthroplasty  He is again doing exceptionally well in his recovery  He has completed aspirin for DVT prophylaxis  His incision has healed very nicely without sign of infection  He has done well to regain range of motion and strength  He should continue with physical therapy and his home exercise program   We will plan to see him back in 6 weeks for re-evaluation with an x-ray on arrival of his left knee  He expressed understanding all of his questions were addressed today  Subjective:  6 weeks status post left total knee arthroplasty    Patient ID: Balbina Holt is a 66 y o  male  Patient is a pleasant 77-year-old presenting today for follow-up 6 weeks after the aforementioned surgery  For him and his daughter, he is doing exceptionally well  He has completed his aspirin for DVT prophylaxis  He has done well with physical therapy and does not have any significant activity related discomfort in the left knee  He denies any new injuries or symptoms    Review of Systems   Constitutional: Negative  HENT: Negative  Eyes: Negative  Respiratory: Negative  Cardiovascular: Negative  Gastrointestinal: Negative  Endocrine: Negative  Genitourinary: Negative  Musculoskeletal: Negative  Skin: Negative  Allergic/Immunologic: Negative  Neurological: Negative  Hematological: Negative  Psychiatric/Behavioral: Negative        Past Medical History:   Diagnosis Date    Anxiety     Arthritis     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 ([facility] [address] )     Hyperlipidemia     Hypertension     [facility]       Past Surgical History:   Procedure Laterality Date    HERNIA REPAIR      OR TOTAL KNEE ARTHROPLASTY Right 4/5/2021 Procedure: ARTHROPLASTY KNEE TOTAL;  Surgeon: Otto Wheeler DO;  Location: [facility];  Service: Orthopedics    [place] TOTAL KNEE ARTHROPLASTY Left 7/13/2021    Procedure: ARTHROPLASTY KNEE TOTAL;  Surgeon: Otto Wheeler DO;  Location: [facility];  Service: Orthopedics       Family History   Problem Relation Age of Onset    Stroke Mother     Stroke Father        Social History     Occupational History    Not on file   Tobacco Use    Smoking status: Never Smoker    Smokeless tobacco: Never Used   Vaping Use    Vaping Use: Never used   Substance and Sexual Activity    Alcohol use: Never    Drug use: Never    Sexual activity: Not Currently         Current Outpatient Medications:     acetaminophen (TYLENOL) 325 mg tablet, Take 3 tablets (975 mg total) by mouth every 8 (eight) hours, Disp: , Rfl: 0    amLODIPine (NORVASC) 5 mg tablet, Take 1 tablet (5 mg total) by mouth 2 (two) times a day, Disp: 180 tablet, Rfl: 3    Ascorbic Acid (Vitamin C ER) 500 MG CPCR, , Disp: , Rfl:     aspirin (ECOTRIN) 325 mg EC tablet, , Disp: , Rfl:     aspirin 325 mg tablet, Take 1 tablet (325 mg total) by mouth 2 (two) times a day, Disp: 84 tablet, Rfl: 0    atorvastatin (LIPITOR) 10 mg tablet, Take 1 tablet (10 mg total) by mouth daily with dinner, Disp: 90 tablet, Rfl: 3    docusate sodium (COLACE) 100 mg capsule, Take 1 capsule (100 mg total) by mouth 2 (two) times a day, Disp: 60 capsule, Rfl: 0    escitalopram (LEXAPRO) 5 mg tablet, Take 1 tablet (5 mg total) by mouth daily at bedtime, Disp: 90 tablet, Rfl: 3    FeroSul 325 (65 Fe) MG tablet, , Disp: , Rfl:     Glucosamine Plus Vitamin D3 8111-7198-1 65 MG-UNIT-MG TABS, , Disp: , Rfl:     labetalol (NORMODYNE) 200 mg tablet, Take 1 tablet (200 mg total) by mouth every 12 (twelve) hours, Disp: 90 tablet, Rfl: 3    losartan (COZAAR) 50 mg tablet, Take 1 tablet (50 mg total) by mouth 2 (two) times a day, Disp: 90 tablet, Rfl: 3    oxyCODONE (ROXICODONE) 5 mg immediate release tablet, Take 1-2 tablets by mouth every 4-6 hours as needed for pain, Disp: 60 tablet, Rfl: 0    No Known Allergies    Objective:  Vitals:    08/26/21 1319   BP: 130/70   Pulse: 67       Body mass index is 22 19 kg/m²  Left Knee Exam     Muscle Strength   The patient has normal left knee strength  Tenderness   The patient is experiencing no tenderness  Range of Motion   Extension:  0 normal   Flexion:  130 normal     Tests   Varus: negative Valgus: negative  Drawer:  Anterior - negative       Patellar apprehension: negative    Other   Erythema: absent  Scars: present  Sensation: normal  Pulse: present  Swelling: none  Effusion: no effusion present    Comments:   Anterior incision well healed  Prosthesis stable at 0, 30, 90   Thigh and calf soft nontender   Ambulates a normal symmetrical gait  Grossly distally neurovascularly unchanged          Observations   Left Knee   Negative for effusion  Physical Exam  Vitals and nursing note reviewed  Constitutional:       Appearance: He is well-developed  Comments: Body mass index is 22 19 kg/m²  HENT:      Head: Normocephalic and atraumatic  Right Ear: External ear normal       Left Ear: External ear normal    Cardiovascular:      Rate and Rhythm: Normal rate  Pulmonary:      Effort: Pulmonary effort is normal    Abdominal:      Palpations: Abdomen is soft  Musculoskeletal:      Cervical back: Normal range of motion  Left knee: No effusion  Comments: See ortho exam   Skin:     General: Skin is warm and dry  Neurological:      Mental Status: He is alert and oriented to person, place, and time  Psychiatric:         Behavior: Behavior normal          Thought Content:  Thought content normal          Judgment: Judgment normal

## 2021-08-27 ENCOUNTER — OFFICE VISIT (OUTPATIENT)
Dept: CARDIOLOGY CLINIC | Facility: CLINIC | Age: 71
End: 2021-08-27
Payer: COMMERCIAL

## 2021-08-27 VITALS
HEART RATE: 62 BPM | BODY MASS INDEX: 22.39 KG/M2 | DIASTOLIC BLOOD PRESSURE: 82 MMHG | HEIGHT: 74 IN | WEIGHT: 174.5 LBS | SYSTOLIC BLOOD PRESSURE: 128 MMHG | OXYGEN SATURATION: 98 % | TEMPERATURE: 99.9 F

## 2021-08-27 DIAGNOSIS — Z12.11 ENCOUNTER FOR SCREENING COLONOSCOPY: ICD-10-CM

## 2021-08-27 DIAGNOSIS — I10 ESSENTIAL HYPERTENSION: Primary | ICD-10-CM

## 2021-08-27 DIAGNOSIS — E78.2 MIXED HYPERLIPIDEMIA: ICD-10-CM

## 2021-08-27 PROCEDURE — 1036F TOBACCO NON-USER: CPT | Performed by: INTERNAL MEDICINE

## 2021-08-27 PROCEDURE — 3079F DIAST BP 80-89 MM HG: CPT | Performed by: INTERNAL MEDICINE

## 2021-08-27 PROCEDURE — 3074F SYST BP LT 130 MM HG: CPT | Performed by: INTERNAL MEDICINE

## 2021-08-27 PROCEDURE — 93000 ELECTROCARDIOGRAM COMPLETE: CPT | Performed by: INTERNAL MEDICINE

## 2021-08-27 PROCEDURE — 1160F RVW MEDS BY RX/DR IN RCRD: CPT | Performed by: INTERNAL MEDICINE

## 2021-08-27 PROCEDURE — 99213 OFFICE O/P EST LOW 20 MIN: CPT | Performed by: INTERNAL MEDICINE

## 2021-08-27 NOTE — PROGRESS NOTES
Cardiology   Dolores Flores DO, Aldo Aldana MD, Sonnie Dubin, MD, Miguel Farias MD, [facility] [place]  -------------------------------------------------------------------  [facility] ORTHOPEDIC [facility]  [address], [address] [facility] [place] [address], [address] [place] [address] Avenue  [phone]    Cardiology Follow Up  Yanelis Chinchilla  [birth year]  [id number]          Assessment/Plan:    1  Essential hypertension  - BP well controlled on current medication regimen  No changes at this time  - POCT ECG  - Comprehensive metabolic panel    2  Mixed hyperlipidemia  - Lipid panel ordered  - Continue statin    3  Encounter for screening colonoscopy  - Ambulatory referral for colon cancer education; Future            Interval History:     Yanelis Chinchilla is 66 y o  male here for followup of hypertension  Since his last visit, he has been feeling well   he denies any palpitations, chest pain, shortness of breath, LE edema, orthopnea or PND  He has undergone bilateral knee replacements without complications  Diet is overall unchanged  There has not been a significant change in weight  The patient has a history of  Hypertension and dyslipidemia  Current medication regimen includes labetalol 200 mg every 12 hours, amlodipine 5 mg b i d  and losartan 50 mg b i d  He also takes atorvastatin 10 mg daily  Most recent blood work was on July 14, 2021   Renal function and electrolytes were normal         The following portions of the patient's history were reviewed and updated as appropriate: allergies, current medications, past family history, past medical history, past social history, past surgical history, and problem list        Current Outpatient Medications:     acetaminophen (TYLENOL) 325 mg tablet, Take 3 tablets (975 mg total) by mouth every 8 (eight) hours, Disp: , Rfl: 0    amLODIPine (NORVASC) 5 mg tablet, Take 1 tablet (5 mg total) by mouth 2 (two) times a day, Disp: 180 tablet, Rfl: 3    Ascorbic Acid (Vitamin C ER) 500 MG CPCR, , Disp: , Rfl:     aspirin (ECOTRIN) 325 mg EC tablet, , Disp: , Rfl:     aspirin 325 mg tablet, Take 1 tablet (325 mg total) by mouth 2 (two) times a day, Disp: 84 tablet, Rfl: 0    atorvastatin (LIPITOR) 10 mg tablet, Take 1 tablet (10 mg total) by mouth daily with dinner, Disp: 90 tablet, Rfl: 3    docusate sodium (COLACE) 100 mg capsule, Take 1 capsule (100 mg total) by mouth 2 (two) times a day, Disp: 60 capsule, Rfl: 0    escitalopram (LEXAPRO) 5 mg tablet, Take 1 tablet (5 mg total) by mouth daily at bedtime, Disp: 90 tablet, Rfl: 3    FeroSul 325 (65 Fe) MG tablet, , Disp: , Rfl:     Glucosamine Plus Vitamin D3 2443-4634-1 65 MG-UNIT-MG TABS, , Disp: , Rfl:     labetalol (NORMODYNE) 200 mg tablet, Take 1 tablet (200 mg total) by mouth every 12 (twelve) hours, Disp: 90 tablet, Rfl: 3    losartan (COZAAR) 50 mg tablet, Take 1 tablet (50 mg total) by mouth 2 (two) times a day, Disp: 90 tablet, Rfl: 3    oxyCODONE (ROXICODONE) 5 mg immediate release tablet, Take 1-2 tablets by mouth every 4-6 hours as needed for pain, Disp: 60 tablet, Rfl: 0        Review of Systems:  Review of Systems   Respiratory: Negative for shortness of breath  Cardiovascular: Negative for chest pain, palpitations and leg swelling  Musculoskeletal: Negative for arthralgias  All other systems reviewed and are negative  Physical Exam:  Vitals:  Vitals:    08/27/21 1416   BP: 128/82   BP Location: Right arm   Patient Position: Sitting   Cuff Size: Standard   Pulse: 62   Temp: 99 9 °F (37 7 °C)   TempSrc: Temporal   SpO2: 98%   Weight: 79 2 kg (174 lb 8 oz)   Height: 6' 2" (1 88 m)     Physical Exam   Constitutional: He appears healthy  No distress  Eyes: Pupils are equal, round, and reactive to light  Conjunctivae are normal    Neck: No JVD present  Cardiovascular: Normal rate, regular rhythm and normal heart sounds  Exam reveals no gallop and no friction rub  No murmur heard  Pulmonary/Chest: Effort normal and breath sounds normal  He has no wheezes  He has no rales  Musculoskeletal:         General: No tenderness, deformity or edema  Cervical back: Normal range of motion and neck supple  Neurological: He is alert and oriented to person, place, and time  Skin: Skin is warm and dry  Cardiographics:  EKG: Personally reviewed  Normal sinus rhythm with T-wave abnormalities    This note was completed in part utilizing [employer] Software  Grammatical errors, random word insertions, spelling mistakes, and incomplete sentences can be an occasional consequence of this system secondary to software limitations, ambient noise, and hardware issues  If you have any questions or concerns about the content, text, or information contained within the body of this dictation, please contact the provider for clarification

## 2021-08-30 ENCOUNTER — OFFICE VISIT (OUTPATIENT)
Dept: PHYSICAL THERAPY | Facility: CLINIC | Age: 71
End: 2021-08-30
Payer: COMMERCIAL

## 2021-08-30 DIAGNOSIS — M25.562 CHRONIC PAIN OF LEFT KNEE: ICD-10-CM

## 2021-08-30 DIAGNOSIS — M17.12 PRIMARY OSTEOARTHRITIS OF LEFT KNEE: Primary | ICD-10-CM

## 2021-08-30 DIAGNOSIS — G89.29 CHRONIC PAIN OF LEFT KNEE: ICD-10-CM

## 2021-08-30 PROCEDURE — 97140 MANUAL THERAPY 1/> REGIONS: CPT | Performed by: PHYSICAL THERAPIST

## 2021-08-30 PROCEDURE — 97110 THERAPEUTIC EXERCISES: CPT | Performed by: PHYSICAL THERAPIST

## 2021-08-30 NOTE — PROGRESS NOTES
Daily Note     Today's date: 2021  Patient name: Luis Hallman  :   MRN: [id number]  Referring provider: Paulo Duarte  Dx:   Encounter Diagnosis     ICD-10-CM    1  Primary osteoarthritis of left knee  M17 12    2  Chronic pain of left knee  M25 562     G89 29                   Subjective:  No complaints      Objective: See treatment diary below   AROM this session 0 to 125 degrees      Assessment: Tolerated treatment well  He is able to walk without a cane and shows good gait technique using no assistive device except that e has mild circumduction of leg during swing  Plan: Continue per plan of care        Precautions: - Hypertension, Anemia, CVA 2005    Specialty Daily Treatment Diary     Manuals 21   Visit #  14 15 16 17   PF mobs   2' 2' 2'   Stretch HS Quad   4' 4' 4'   Knee PROM   4' 4' 4'   L knee ROM 0-120 deg 0 to 123° 0 to 122 degees 122 degrees flex 125° flex AROM   Warm-up        NuStep 10' upright 10' upright 10' upright 10' bike 10' bike   Neuro Re-Ed        Qset        Wt shift                Ther Ex        Mini squat   TG L 20   40 TG L 20   40 TG L 20   40   Side step 5 x 20 ft 6 x 20 ft 8 x 20 ft 8 x 20 ft 8 x 20 ft   Knee flex   40   3# 40   3# 40   4#   Knee ext 30 30   3# 40   3# 40   3# 40   4#   SLR 30 2# 30   2# 30   3# 40   3# 40   4#   Heel slide TG L20 x45 TG L 20  50 Heel slide on pnut  40 Heel slide on pnut  40 40   Hip Abd Standing 2# 20  2# 30   3# 40   3# 40   4#   Hip Add 20  ---     Lunge str on step 30 30 30 30    Standing TKE  Step up 20  6" 30  6" 30  6" 30  6"   Leg Press 55# 30  95# 30   95# 30   95# 30   95# 40   95#   Gait Training              6 x 4 hurdles           Modalities        CP 5' 5' 5' 5' 5'

## 2021-09-03 ENCOUNTER — OFFICE VISIT (OUTPATIENT)
Dept: PHYSICAL THERAPY | Facility: CLINIC | Age: 71
End: 2021-09-03
Payer: COMMERCIAL

## 2021-09-03 DIAGNOSIS — G89.29 CHRONIC PAIN OF LEFT KNEE: ICD-10-CM

## 2021-09-03 DIAGNOSIS — M25.562 CHRONIC PAIN OF LEFT KNEE: ICD-10-CM

## 2021-09-03 DIAGNOSIS — M17.12 PRIMARY OSTEOARTHRITIS OF LEFT KNEE: Primary | ICD-10-CM

## 2021-09-03 LAB
ALBUMIN SERPL-MCNC: 4.3 G/DL (ref 3.7–4.7)
ALBUMIN/GLOB SERPL: 1.4 {RATIO} (ref 1.2–2.2)
ALP SERPL-CCNC: 99 IU/L (ref 48–121)
ALT SERPL-CCNC: 11 IU/L (ref 0–44)
AST SERPL-CCNC: 18 IU/L (ref 0–40)
BILIRUB SERPL-MCNC: 0.5 MG/DL (ref 0–1.2)
BUN SERPL-MCNC: 16 MG/DL (ref 8–27)
BUN/CREAT SERPL: 15 (ref 10–24)
CALCIUM SERPL-MCNC: 9.4 MG/DL (ref 8.6–10.2)
CHLORIDE SERPL-SCNC: 102 MMOL/L (ref 96–106)
CHOLEST SERPL-MCNC: 117 MG/DL (ref 100–199)
CO2 SERPL-SCNC: 26 MMOL/L (ref 20–29)
CREAT SERPL-MCNC: 1.08 MG/DL (ref 0.76–1.27)
GLOBULIN SER-MCNC: 3.1 G/DL (ref 1.5–4.5)
GLUCOSE SERPL-MCNC: 109 MG/DL (ref 65–99)
HDLC SERPL-MCNC: 59 MG/DL
LDLC SERPL CALC-MCNC: 47 MG/DL (ref 0–99)
LDLC/HDLC SERPL: 0.8 RATIO (ref 0–3.6)
POTASSIUM SERPL-SCNC: 4.4 MMOL/L (ref 3.5–5.2)
PROT SERPL-MCNC: 7.4 G/DL (ref 6–8.5)
SL AMB EGFR AFRICAN AMERICAN: 78 ML/MIN/1.73
SL AMB EGFR NON AFRICAN AMERICAN: 68 ML/MIN/1.73
SL AMB VLDL CHOLESTEROL CALC: 11 MG/DL (ref 5–40)
SODIUM SERPL-SCNC: 140 MMOL/L (ref 134–144)
TRIGL SERPL-MCNC: 46 MG/DL (ref 0–149)

## 2021-09-03 PROCEDURE — 97140 MANUAL THERAPY 1/> REGIONS: CPT | Performed by: PHYSICAL THERAPIST

## 2021-09-03 PROCEDURE — 97110 THERAPEUTIC EXERCISES: CPT | Performed by: PHYSICAL THERAPIST

## 2021-09-03 NOTE — PROGRESS NOTES
Daily Note     Today's date: 9/3/2021  Patient name: Marcela Elizondo  :   MRN: [id number]  Referring provider: Alcides Lloyd  Dx:   Encounter Diagnosis     ICD-10-CM    1  Primary osteoarthritis of left knee  M17 12    2  Chronic pain of left knee  M25 562     G89 29                   Subjective:  Yuval Vasquez reports his pain is "good" today      Objective: See treatment diary below   AROM this session 0 to 125 degrees      Assessment: Tolerated treatment well  He has good flexion ROM but extension deficits persist at end range  Plan: Continue per plan of care  Add HS stretch with strap       Precautions: - Hypertension, Anemia, CVA 2005    Specialty Daily Treatment Diary     Manuals 8/20/21 8/23/21 8/25/21 8/30/21 9/3/21   Visit # 14 15 16 17 18   PF mobs  2' 2' 2' 2'   Stretch HS Quad  4' 4' 4' 4'4'   Knee PROM  4' 4' 4'    L knee ROM 0 to 123° 0 to 122 degees 122 degrees flex 125° flex AROM    Warm-up        NuStep 10' upright 10' upright 10' bike 10' bike 10'   Neuro Re-Ed        Qset        Wt shift        HS strap stretch        Ther Ex        Mini squat  TG L 20   40 TG L 20   40 TG L 20   40 40   Side step 6 x 20 ft 8 x 20 ft 8 x 20 ft 8 x 20 ft 8 x 20 ft   Knee flex  40   3# 40   3# 40   4# 40   4#   Knee ext 30   3# 40   3# 40   3# 40   4# 40   4#   SLR 30   2# 30   3# 40   3# 40   4# 40   4#   Heel slide TG L 20  50 Heel slide on pnut  40 Heel slide on pnut  40 40 40   Hip Abd 20  2# 30   3# 40   3# 40   4# 40   4#   Lunge str on step 30 30 30  --   Standing TKE Step up 20  6" 30  6" 30  6" 30  6" 30   6"   Leg Press 55# 30   95# 30   95# 30   95# 40   95# 50   95#   Gait Training             6 x 4 hurdles            Modalities        CP 5' 5' 5' 5' 5'

## 2021-09-07 ENCOUNTER — TELEPHONE (OUTPATIENT)
Dept: CARDIOLOGY CLINIC | Facility: CLINIC | Age: 71
End: 2021-09-07

## 2021-09-07 ENCOUNTER — OFFICE VISIT (OUTPATIENT)
Dept: PHYSICAL THERAPY | Facility: CLINIC | Age: 71
End: 2021-09-07
Payer: COMMERCIAL

## 2021-09-07 DIAGNOSIS — M17.12 PRIMARY OSTEOARTHRITIS OF LEFT KNEE: Primary | ICD-10-CM

## 2021-09-07 DIAGNOSIS — M25.562 CHRONIC PAIN OF LEFT KNEE: ICD-10-CM

## 2021-09-07 DIAGNOSIS — G89.29 CHRONIC PAIN OF LEFT KNEE: ICD-10-CM

## 2021-09-07 PROCEDURE — 97140 MANUAL THERAPY 1/> REGIONS: CPT | Performed by: PHYSICAL THERAPIST

## 2021-09-07 PROCEDURE — 97110 THERAPEUTIC EXERCISES: CPT | Performed by: PHYSICAL THERAPIST

## 2021-09-08 ENCOUNTER — DOCUMENTATION (OUTPATIENT)
Dept: FAMILY MEDICINE CLINIC | Facility: CLINIC | Age: 71
End: 2021-09-08

## 2021-09-08 DIAGNOSIS — Z86.73 HISTORY OF HEMORRHAGIC CEREBROVASCULAR ACCIDENT (CVA) WITHOUT RESIDUAL DEFICITS: ICD-10-CM

## 2021-09-08 DIAGNOSIS — I10 ESSENTIAL HYPERTENSION: ICD-10-CM

## 2021-09-08 DIAGNOSIS — E55.9 VITAMIN D DEFICIENCY: ICD-10-CM

## 2021-09-08 DIAGNOSIS — Z96.652 STATUS POST TOTAL LEFT KNEE REPLACEMENT USING CEMENT: ICD-10-CM

## 2021-09-08 DIAGNOSIS — F32.4 MAJOR DEPRESSIVE DISORDER IN PARTIAL REMISSION, UNSPECIFIED WHETHER RECURRENT (HCC): ICD-10-CM

## 2021-09-08 DIAGNOSIS — E61.1 IRON DEFICIENCY: Primary | ICD-10-CM

## 2021-09-08 RX ORDER — FERROUS SULFATE 325(65) MG
325 TABLET ORAL
Qty: 90 TABLET | Refills: 3 | Status: SHIPPED | OUTPATIENT
Start: 2021-09-08 | End: 2022-09-08

## 2021-09-08 RX ORDER — ESCITALOPRAM OXALATE 5 MG/1
5 TABLET ORAL
Qty: 90 TABLET | Refills: 3 | Status: SHIPPED | OUTPATIENT
Start: 2021-09-08 | End: 2021-10-08

## 2021-09-08 RX ORDER — LOSARTAN POTASSIUM 50 MG/1
50 TABLET ORAL 2 TIMES DAILY
Qty: 90 TABLET | Refills: 3 | Status: SHIPPED | OUTPATIENT
Start: 2021-09-08 | End: 2021-10-08

## 2021-09-08 RX ORDER — DOCUSATE SODIUM 100 MG/1
100 CAPSULE, LIQUID FILLED ORAL 2 TIMES DAILY
Qty: 60 CAPSULE | Refills: 0 | Status: SHIPPED | OUTPATIENT
Start: 2021-09-08

## 2021-09-08 RX ORDER — LABETALOL 200 MG/1
200 TABLET, FILM COATED ORAL EVERY 12 HOURS SCHEDULED
Qty: 90 TABLET | Refills: 3 | Status: SHIPPED | OUTPATIENT
Start: 2021-09-08 | End: 2021-10-08

## 2021-09-08 RX ORDER — GLUCOSAMINE/D3/HYALURONIC ACID 1000MG-25
1 TABLET ORAL DAILY
Qty: 90 TABLET | Refills: 5 | Status: SHIPPED | OUTPATIENT
Start: 2021-09-08 | End: 2022-09-08

## 2021-09-08 RX ORDER — ATORVASTATIN CALCIUM 10 MG/1
10 TABLET, FILM COATED ORAL
Qty: 90 TABLET | Refills: 3 | Status: SHIPPED | OUTPATIENT
Start: 2021-09-08 | End: 2021-12-07

## 2021-09-08 RX ORDER — AMLODIPINE BESYLATE 5 MG/1
5 TABLET ORAL 2 TIMES DAILY
Qty: 180 TABLET | Refills: 3 | Status: SHIPPED | OUTPATIENT
Start: 2021-09-08 | End: 2021-12-07

## 2021-09-08 RX ORDER — ASPIRIN 325 MG
325 TABLET ORAL 2 TIMES DAILY
Qty: 84 TABLET | Refills: 0 | Status: SHIPPED | OUTPATIENT
Start: 2021-09-08

## 2021-09-10 ENCOUNTER — APPOINTMENT (OUTPATIENT)
Dept: PHYSICAL THERAPY | Facility: CLINIC | Age: 71
End: 2021-09-10
Payer: COMMERCIAL

## 2021-09-13 ENCOUNTER — OFFICE VISIT (OUTPATIENT)
Dept: PHYSICAL THERAPY | Facility: CLINIC | Age: 71
End: 2021-09-13
Payer: COMMERCIAL

## 2021-09-13 DIAGNOSIS — G89.29 CHRONIC PAIN OF LEFT KNEE: ICD-10-CM

## 2021-09-13 DIAGNOSIS — M25.562 CHRONIC PAIN OF LEFT KNEE: ICD-10-CM

## 2021-09-13 DIAGNOSIS — M17.12 PRIMARY OSTEOARTHRITIS OF LEFT KNEE: Primary | ICD-10-CM

## 2021-09-13 PROCEDURE — 97110 THERAPEUTIC EXERCISES: CPT | Performed by: PHYSICAL THERAPIST

## 2021-09-13 PROCEDURE — 97140 MANUAL THERAPY 1/> REGIONS: CPT | Performed by: PHYSICAL THERAPIST

## 2021-09-13 NOTE — PROGRESS NOTES
Daily Note     Today's date: 2021  Patient name: Maricarmen Cardenas  : [id number]  MRN: [id number]  Referring provider: Esteban Levy  Dx:   Encounter Diagnosis     ICD-10-CM    1  Primary osteoarthritis of left knee  M17 12    2  Chronic pain of left knee  M25 562     G89 29                   Subjective:   He has no pain      Objective: See treatment diary below   AROM this session -2 to 128 degrees      Assessment: Tolerated treatment well  He is progressing well with ROM and other goals however his gait can he stiff with initial walking  Plan: Continue per plan of care     Discuss D/C next session     Precautions: - Hypertension, Anemia, CVA 2005    Specialty Daily Treatment Diary     Manuals 8/25/21 8/30/21 9/3/21 9/7/21 9/13/21   Visit # 16 17 18 19 20   PF mobs 2' 2' 2' 2' 2'   Stretch HS Quad 4' 4' 4'4' 4' 4'   Knee PROM 4' 4'  4' 4'   L knee  degrees flex 125° flex AROM   -2 to 128 deg   Warm-up        NuStep 10' bike 10' bike 10' 10' 10'   Neuro Re-Ed                        Ther Ex        Mini squat TG L 20   40 TG L 20   40 40 TG L 21   40 TG L 21   50   Side step 8 x 20 ft 8 x 20 ft 8 x 20 ft 8 x 20 ft 8 x 20 ft   Knee flex 40   3# 40   4# 40   4# 40   4# 50   4#   Knee ext 40   3# 40   4# 40   4# 40   4# 50   4#   SLR 40   3# 40   4# 40   4# 40   4# 50   4#   Heel slide Heel slide on pnut  40 40 40 40 50   Hip Abd 40   3# 40   4# 40   4# 40   4# 50   4#   Stairs 30  6" 30  6" 30   6" 30   6" 30   6"   Leg Press 55# 30   95# 40   95# 50   95# 60  95# 60  95#   Gait Training           6 x 4 hurdles              Modalities        CP 5' 5' 5' 5' 5'

## 2021-09-17 ENCOUNTER — OFFICE VISIT (OUTPATIENT)
Dept: PHYSICAL THERAPY | Facility: CLINIC | Age: 71
End: 2021-09-17
Payer: COMMERCIAL

## 2021-09-17 DIAGNOSIS — M17.12 PRIMARY OSTEOARTHRITIS OF LEFT KNEE: Primary | ICD-10-CM

## 2021-09-17 DIAGNOSIS — G89.29 CHRONIC PAIN OF LEFT KNEE: ICD-10-CM

## 2021-09-17 DIAGNOSIS — M25.562 CHRONIC PAIN OF LEFT KNEE: ICD-10-CM

## 2021-09-17 PROCEDURE — 97110 THERAPEUTIC EXERCISES: CPT | Performed by: PHYSICAL THERAPIST

## 2021-09-17 PROCEDURE — 97140 MANUAL THERAPY 1/> REGIONS: CPT | Performed by: PHYSICAL THERAPIST

## 2021-09-17 NOTE — PROGRESS NOTES
PT Discharge    Patient has done well with PT  He has 5/5 MMT left quad and HS  AROM left knee = 0 to 130 degrees  He has no gait deviations and has begun short distances with no assistive device  Patient has achieved all goals at this time and will continue to perform HEP independently  D/C at this time

## 2021-09-17 NOTE — PROGRESS NOTES
Daily Note     Today's date: 2021  Patient name: Yanelis Chinchilla  :   MRN: [id number]  Referring provider: Anthony Jerez  Dx:   Encounter Diagnosis     ICD-10-CM    1  Primary osteoarthritis of left knee  M17 12    2  Chronic pain of left knee  M25 562     G89 29                   Subjective:   He has no pain      Objective: See treatment diary below   AROM this session 0 to 130 degrees      Assessment:  He has achieved all goals        Plan: Discharge at this time     Precautions: - Hypertension, Anemia, CVA 2005    Specialty Daily Treatment Diary     Manuals 8/30/21 9/3/21 9/7/21 9/13/21 9/17/21   Visit # 17 18 19 20 21   PF mobs 2' 2' 2' 2' 2'   Stretch HS Quad 4' 4'4' 4' 4' 4'   Knee PROM 4'  4' 4' 4'   L knee ° flex AROM   -2 to 128 deg    Warm-up        NuStep 10' bike 10' 10' 10' 10'   Neuro Re-Ed                        Ther Ex        Mini squat TG L 20   40 40 TG L 21   40 TG L 21   50 50  Lv 21   Side step 8 x 20 ft 8 x 20 ft 8 x 20 ft 8 x 20 ft 8 x 20 ft   Knee flex 40   4# 40   4# 40   4# 50   4# 50   5#   Knee ext 40   4# 40   4# 40   4# 50   4# 50   5#   SLR 40   4# 40   4# 40   4# 50   4# 50   5#   Heel slide 40 40 40 50 50   Hip Abd 40   4# 40   4# 40   4# 50   4# --   Stairs 30  6" 30   6" 30   6" 30   6" 30  8"   Leg Press 55# 40   95# 50   95# 60  95# 60  95# 60    95#   Gait Training          6 x 4 hurdles    High knees  8 x 20 ft           Modalities        CP 5' 5' 5' 5' 5'

## 2021-10-22 NOTE — PROGRESS NOTES
Daily Note     Today's date: 2021  Patient name: Rema Bran  : [id number]  MRN: [id number]  Referring provider: Winfred Opitz  Dx:   Encounter Diagnosis     ICD-10-CM    1  Primary osteoarthritis of left knee  M17 12    2  Chronic pain of left knee  M25 562     G89 29                   Subjective:   He has no complaints of pain      Objective: See treatment diary below   AROM this session -2 to 125 degrees      Assessment: Tolerated treatment well  His gait is stiff initially following periods of not standing  After 10 feet of walking, his knee flexion during swing improves  Plan: Continue per plan of care         Precautions: - Hypertension, Anemia, CVA 2005    Specialty Daily Treatment Diary     Manuals 8/23/21 8/25/21 8/30/21 9/3/21 9/7/21   Visit # 15 16 16 18 19   PF mobs 2' 2' 2' 2' 2'   Stretch HS Quad 4' 4' 4' 4'4' 4'   Knee PROM 4' 4' 4'  4'   L knee ROM 0 to 122 degees 122 degrees flex 125° flex AROM     Warm-up        NuStep 10' upright 10' bike 10' bike 10' 10'   Neuro Re-Ed        Qset        Wt shift        HS strap stretch        Ther Ex        Mini squat TG L 20   40 TG L 20   40 TG L 20   40 40 TG L 21   40   Side step 8 x 20 ft 8 x 20 ft 8 x 20 ft 8 x 20 ft 8 x 20 ft   Knee flex 40   3# 40   3# 40   4# 40   4# 40   4#   Knee ext 40   3# 40   3# 40   4# 40   4# 40   4#   SLR 30   3# 40   3# 40   4# 40   4# 40   4#   Heel slide Heel slide on pnut  40 Heel slide on pnut  40 40 40 40   Hip Abd 30   3# 40   3# 40   4# 40   4# 40   4#   Lunge str on step 30 30  --    Standing TKE 30  6" 30  6" 30  6" 30   6" 30   6"   Leg Press 55# 30   95# 30   95# 40   95# 50   95# 60  95#   Gait Training            6 x 4 hurdles             Modalities        CP 5' 5' 5' 5' 5'
n/a

## 2022-08-25 DIAGNOSIS — D50.8 OTHER IRON DEFICIENCY ANEMIA: ICD-10-CM

## 2022-08-25 DIAGNOSIS — E78.2 MIXED HYPERLIPIDEMIA: ICD-10-CM

## 2022-08-25 DIAGNOSIS — I10 ESSENTIAL HYPERTENSION: ICD-10-CM

## 2022-08-25 DIAGNOSIS — Z13.228 SCREENING FOR METABOLIC DISORDER: Primary | ICD-10-CM

## 2022-09-03 ENCOUNTER — APPOINTMENT (OUTPATIENT)
Dept: LAB | Facility: HOSPITAL | Age: 72
End: 2022-09-03
Payer: COMMERCIAL

## 2022-09-03 DIAGNOSIS — D50.8 OTHER IRON DEFICIENCY ANEMIAS: ICD-10-CM

## 2022-09-03 DIAGNOSIS — I10 ESSENTIAL HYPERTENSION, BENIGN: Primary | ICD-10-CM

## 2022-09-03 LAB
ALBUMIN SERPL BCP-MCNC: 3.7 G/DL (ref 3.5–5)
ALP SERPL-CCNC: 85 U/L (ref 46–116)
ALT SERPL W P-5'-P-CCNC: 32 U/L (ref 12–78)
ANION GAP SERPL CALCULATED.3IONS-SCNC: 4 MMOL/L (ref 4–13)
AST SERPL W P-5'-P-CCNC: 23 U/L (ref 5–45)
BILIRUB SERPL-MCNC: 0.7 MG/DL (ref 0.2–1)
BUN SERPL-MCNC: 24 MG/DL (ref 5–25)
CALCIUM SERPL-MCNC: 8.7 MG/DL (ref 8.3–10.1)
CHLORIDE SERPL-SCNC: 103 MMOL/L (ref 96–108)
CHOLEST SERPL-MCNC: 110 MG/DL
CO2 SERPL-SCNC: 32 MMOL/L (ref 21–32)
CREAT SERPL-MCNC: 1.09 MG/DL (ref 0.6–1.3)
ERYTHROCYTE [DISTWIDTH] IN BLOOD BY AUTOMATED COUNT: 14 % (ref 11.6–15.1)
GFR SERPL CREATININE-BSD FRML MDRD: 66 ML/MIN/1.73SQ M
GLUCOSE P FAST SERPL-MCNC: 87 MG/DL (ref 65–99)
HCT VFR BLD AUTO: 37.1 % (ref 36.5–49.3)
HDLC SERPL-MCNC: 69 MG/DL
HGB BLD-MCNC: 12.1 G/DL (ref 12–17)
LDLC SERPL CALC-MCNC: 33 MG/DL (ref 0–100)
MCH RBC QN AUTO: 29.4 PG (ref 26.8–34.3)
MCHC RBC AUTO-ENTMCNC: 32.6 G/DL (ref 31.4–37.4)
MCV RBC AUTO: 90 FL (ref 82–98)
NONHDLC SERPL-MCNC: 41 MG/DL
PLATELET # BLD AUTO: 184 THOUSANDS/UL (ref 149–390)
PMV BLD AUTO: 9.6 FL (ref 8.9–12.7)
POTASSIUM SERPL-SCNC: 4.5 MMOL/L (ref 3.5–5.3)
PROT SERPL-MCNC: 7.3 G/DL (ref 6.4–8.4)
RBC # BLD AUTO: 4.12 MILLION/UL (ref 3.88–5.62)
SODIUM SERPL-SCNC: 139 MMOL/L (ref 135–147)
TRIGL SERPL-MCNC: 41 MG/DL
WBC # BLD AUTO: 5.21 THOUSAND/UL (ref 4.31–10.16)

## 2022-09-03 PROCEDURE — 80061 LIPID PANEL: CPT

## 2022-09-03 PROCEDURE — 36415 COLL VENOUS BLD VENIPUNCTURE: CPT

## 2022-09-03 PROCEDURE — 85027 COMPLETE CBC AUTOMATED: CPT

## 2022-09-03 PROCEDURE — 83036 HEMOGLOBIN GLYCOSYLATED A1C: CPT

## 2022-09-03 PROCEDURE — 80053 COMPREHEN METABOLIC PANEL: CPT

## 2022-09-04 LAB
EST. AVERAGE GLUCOSE BLD GHB EST-MCNC: 117 MG/DL
HBA1C MFR BLD: 5.7 %

## 2022-09-06 DIAGNOSIS — E61.1 IRON DEFICIENCY: ICD-10-CM

## 2022-09-06 DIAGNOSIS — I10 ESSENTIAL HYPERTENSION: ICD-10-CM

## 2022-09-06 RX ORDER — FERROUS SULFATE 325(65) MG
TABLET ORAL
Qty: 90 TABLET | Refills: 3 | Status: SHIPPED | OUTPATIENT
Start: 2022-09-06

## 2022-09-06 RX ORDER — LOSARTAN POTASSIUM 50 MG/1
TABLET ORAL
Qty: 90 TABLET | Refills: 3 | Status: SHIPPED | OUTPATIENT
Start: 2022-09-06

## 2022-09-15 ENCOUNTER — OFFICE VISIT (OUTPATIENT)
Dept: PODIATRY | Facility: CLINIC | Age: 72
End: 2022-09-15

## 2022-09-15 VITALS
RESPIRATION RATE: 18 BRPM | WEIGHT: 174 LBS | HEIGHT: 74 IN | DIASTOLIC BLOOD PRESSURE: 82 MMHG | BODY MASS INDEX: 22.33 KG/M2 | SYSTOLIC BLOOD PRESSURE: 128 MMHG

## 2022-09-15 DIAGNOSIS — M79.671 PAIN IN BOTH FEET: ICD-10-CM

## 2022-09-15 DIAGNOSIS — B35.1 ONYCHOMYCOSIS: ICD-10-CM

## 2022-09-15 DIAGNOSIS — L85.3 XEROSIS CUTIS: Primary | ICD-10-CM

## 2022-09-15 DIAGNOSIS — M20.41 HAMMER TOES OF BOTH FEET: ICD-10-CM

## 2022-09-15 DIAGNOSIS — M79.672 PAIN IN BOTH FEET: ICD-10-CM

## 2022-09-15 DIAGNOSIS — L60.0 INGROWN TOENAIL: ICD-10-CM

## 2022-09-15 DIAGNOSIS — I73.9 PERIPHERAL VASCULAR DISEASE, UNSPECIFIED (HCC): ICD-10-CM

## 2022-09-15 DIAGNOSIS — M20.42 HAMMER TOES OF BOTH FEET: ICD-10-CM

## 2022-09-15 RX ORDER — AMMONIUM LACTATE 12 G/100G
LOTION TOPICAL 2 TIMES DAILY PRN
Qty: 400 G | Refills: 0 | Status: SHIPPED | OUTPATIENT
Start: 2022-09-15

## 2022-12-04 NOTE — PROGRESS NOTES
Assessment/Plan:    Patient opted out of oral and topical anti fungal medications at this time, all onychomycotic dystrophic nails debrided using sterile Nipper and electrical gaurang to patient tolerance, Betadine applied, patient educated on daily foot hygiene for the management of fungal infection  Will use Lac-Hydrin for xerosis discussed with the patient its use, prescription sent to the patient's pharmacy     Diagnoses and all orders for this visit:    Xerosis cutis  -     ammonium lactate (LAC-HYDRIN) 12 % lotion; Apply topically 2 (two) times a day as needed for dry skin    Peripheral vascular disease, unspecified (HCC)    Onychomycosis    Pain in both feet    Ingrown toenail    Hammer toes of both feet          Subjective:      Patient ID: Penny Goyal is a 74 y o  male  78-year-old male with no pertinent Podiatry past medical history presents to the office with painful thickened dystrophic discolored toenails, patient is accompanied by his daughter which is a nurse, patient is not an [place] speaker, the was collected from patient daughter      The following portions of the patient's history were reviewed and updated as appropriate: allergies, current medications, past family history, past medical history, past social history, past surgical history and problem list     Review of Systems   Constitutional: Negative  Respiratory: Negative  Cardiovascular: Positive for leg swelling  Musculoskeletal: Positive for arthralgias and back pain  Skin: Positive for color change and rash                 Historical Information   Past Medical History:   Diagnosis Date   • Anxiety    • Arthritis    • 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 ([facility] [address] )    • Hyperlipidemia    • Hypertension    • Stroke [facility]) 2005     Past Surgical History:   Procedure Laterality Date   • HERNIA REPAIR     • AR TOTAL KNEE ARTHROPLASTY Right 4/5/2021    Procedure: ARTHROPLASTY KNEE TOTAL;  Surgeon: Gisselle Jauregui DO;  Location: [address] [place] [address]; Service: Orthopedics   • PA TOTAL KNEE ARTHROPLASTY Left 7/13/2021    Procedure: ARTHROPLASTY KNEE TOTAL;  Surgeon: Jt Kendrick DO;  Location: [place] [facility];  Service: Orthopedics     Social History   Social History     Substance and Sexual Activity   Alcohol Use Never     Social History     Substance and Sexual Activity   Drug Use Never     Social History     Tobacco Use   Smoking Status Never   Smokeless Tobacco Never     Family History:   Family History   Problem Relation Age of Onset   • Stroke Mother    • Stroke Father        Meds/Allergies   all medications and allergies reviewed  No Known Allergies    Objective:      /82   Resp 18   Ht 6' 2" (1 88 m)   Wt 78 9 kg (174 lb)   BMI 22 34 kg/m²          Physical Exam  Constitutional:       General: He is active  He is not in acute distress  Appearance: He is well-developed and well-nourished  He is not ill-appearing, toxic-appearing, sickly-appearing or diaphoretic  HENT:      Head: Normocephalic and atraumatic  Cardiovascular:      Pulses: Normal pulses  Dorsalis pedis pulses are 2+ on the right side and 2+ on the left side  Posterior tibial pulses are 2+ on the right side and 2+ on the left side  Comments: Palpable pedal pulse, CFT is less than 3 seconds, temperature gradient within normal limits, pedal hair present, no trophic skin changes  Pulmonary:      Effort: Pulmonary effort is normal  No respiratory distress  Musculoskeletal:         General: Tenderness, deformity and edema present  Comments: No pain on palpation on range of motion of ankle joint subtalar joint metatarsal joint tarsal joints bilateral foot   Feet:      Right foot:      Protective Sensation: 10 sites tested  10 sites sensed  Skin integrity: No ulcer, blister, skin breakdown or erythema  Toenail Condition: Right toenails are abnormally thick, long and ingrown  Fungal disease present       Left foot:      Protective Sensation: 10 sites tested  10 sites sensed  Skin integrity: No ulcer, blister, skin breakdown or erythema  Toenail Condition: Left toenails are abnormally thick, long and ingrown  Fungal disease present  Skin:     General: Skin is dry and intact  Capillary Refill: Capillary refill takes 2 to 3 seconds  Coloration: Skin is not pale  Comments: Trophic skin changes bilateral foot and ankle, alternating hypopigmentation and hyperpigmentation patches around the foot and ankle on anterior leg, skin is shiny and thin, absent hair growth, atrophy of fat pad  Multiple callus formation plantar foot painful on palpation to plantar heel bilateral, skin lines absent, hyperkeratotic rim and central atrophy noted  Thickened dystrophic discolored toenails of bilateral hallux, 5th digit bilateral, subungual debris and painful upon palpation, all other nails show signs of dystrophy with no subungual debris, all nails have malodor  Neurological:      Mental Status: He is alert and oriented to person, place, and time  Sensory: Sensory deficit present  Motor: Atrophy present  Deep Tendon Reflexes: Reflexes abnormal       Comments:  muscle power 5/5, protective sensations decreased, no focal motor deficit  Psychiatric:         Mood and Affect: Mood and affect normal       Foot Exam    Right Foot/Ankle     Inspection and Palpation  Skin Exam: no blister, no maceration, no ulcer and no erythema     Neurovascular  Dorsalis pedis: 2+  Posterior tibial: 2+      Left Foot/Ankle      Inspection and Palpation  Skin Exam: no blister, no maceration, no ulcer and no erythema     Neurovascular  Dorsalis pedis: 2+  Posterior tibial: 2+              Portions of the record may have been created with voice recognition software  Occasional wrong word or "sound a like" substitutions may have occurred due to the inherent limitations of voice recognition software   Read the chart carefully and recognize, using context, where substitutions have occurred

## 2022-12-06 DIAGNOSIS — F32.4 MAJOR DEPRESSIVE DISORDER IN PARTIAL REMISSION, UNSPECIFIED WHETHER RECURRENT (HCC): ICD-10-CM

## 2022-12-06 DIAGNOSIS — Z86.73 HISTORY OF HEMORRHAGIC CEREBROVASCULAR ACCIDENT (CVA) WITHOUT RESIDUAL DEFICITS: ICD-10-CM

## 2022-12-06 DIAGNOSIS — I10 ESSENTIAL HYPERTENSION: ICD-10-CM

## 2022-12-08 RX ORDER — ATORVASTATIN CALCIUM 10 MG/1
TABLET, FILM COATED ORAL
Qty: 30 TABLET | Refills: 3 | Status: SHIPPED | OUTPATIENT
Start: 2022-12-08

## 2022-12-08 RX ORDER — LABETALOL 200 MG/1
TABLET, FILM COATED ORAL
Qty: 60 TABLET | Refills: 3 | Status: SHIPPED | OUTPATIENT
Start: 2022-12-08

## 2022-12-08 RX ORDER — AMLODIPINE BESYLATE 5 MG/1
TABLET ORAL
Qty: 180 TABLET | Refills: 3 | Status: SHIPPED | OUTPATIENT
Start: 2022-12-08

## 2022-12-08 RX ORDER — ESCITALOPRAM OXALATE 5 MG/1
TABLET ORAL
Qty: 30 TABLET | Refills: 3 | Status: SHIPPED | OUTPATIENT
Start: 2022-12-08

## 2023-09-21 ENCOUNTER — TELEPHONE (OUTPATIENT)
Dept: FAMILY MEDICINE CLINIC | Facility: CLINIC | Age: 73
End: 2023-09-21

## 2023-09-21 DIAGNOSIS — E78.2 MIXED HYPERLIPIDEMIA: ICD-10-CM

## 2023-09-21 DIAGNOSIS — F41.9 ANXIETY: ICD-10-CM

## 2023-09-21 DIAGNOSIS — Z11.59 NEED FOR HEPATITIS C SCREENING TEST: Primary | ICD-10-CM

## 2023-09-21 DIAGNOSIS — R05.2 SUBACUTE COUGH: ICD-10-CM

## 2023-09-21 DIAGNOSIS — D50.8 OTHER IRON DEFICIENCY ANEMIA: ICD-10-CM

## 2023-09-21 DIAGNOSIS — I10 ESSENTIAL HYPERTENSION: ICD-10-CM

## 2023-09-21 PROBLEM — R65.10 SYSTEMIC INFLAMMATORY RESPONSE SYNDROME (HCC): Status: RESOLVED | Noted: 2020-12-25 | Resolved: 2023-09-21

## 2023-09-21 NOTE — TELEPHONE ENCOUNTER
VM on appt line:    Hi, good afternoon. I was trying to call for almost hours for Dad Stevefinesse Patel last MSN [id number]. He has appointment with Doctor Jasmyn Shadow today at 3:30 he was coughing that's why appointment was made. But now he's feeling better, so he will not come. Thank you so much. Tried to call to confirm cancellation, unable to reach anyone. Cancelled appt - put note in appt.

## 2023-09-23 ENCOUNTER — HOSPITAL ENCOUNTER (OUTPATIENT)
Dept: RADIOLOGY | Facility: HOSPITAL | Age: 73
Discharge: HOME | End: 2023-09-23
Payer: COMMERCIAL

## 2023-09-23 ENCOUNTER — APPOINTMENT (OUTPATIENT)
Dept: LAB | Facility: HOSPITAL | Age: 73
End: 2023-09-23
Payer: COMMERCIAL

## 2023-09-23 DIAGNOSIS — I10 ESSENTIAL HYPERTENSION, MALIGNANT: ICD-10-CM

## 2023-09-23 DIAGNOSIS — D50.8 IRON DEFICIENCY ANEMIA SECONDARY TO INADEQUATE DIETARY IRON INTAKE: ICD-10-CM

## 2023-09-23 DIAGNOSIS — Z11.59 SCREENING EXAMINATION FOR POLIOMYELITIS: ICD-10-CM

## 2023-09-23 DIAGNOSIS — F41.9 ANXIETY HYPERVENTILATION: ICD-10-CM

## 2023-09-23 DIAGNOSIS — F45.8 ANXIETY HYPERVENTILATION: ICD-10-CM

## 2023-09-23 DIAGNOSIS — R05.2 SUBACUTE COUGH: ICD-10-CM

## 2023-09-23 LAB
ALBUMIN SERPL BCP-MCNC: 4 G/DL (ref 3.5–5)
ALP SERPL-CCNC: 71 U/L (ref 34–104)
ALT SERPL W P-5'-P-CCNC: 16 U/L (ref 7–52)
ANION GAP SERPL CALCULATED.3IONS-SCNC: 4 MMOL/L
AST SERPL W P-5'-P-CCNC: 21 U/L (ref 13–39)
BASOPHILS # BLD AUTO: 0.06 THOUSANDS/ÂΜL (ref 0–0.1)
BASOPHILS NFR BLD AUTO: 1 % (ref 0–1)
BILIRUB SERPL-MCNC: 0.68 MG/DL (ref 0.2–1)
BUN SERPL-MCNC: 21 MG/DL (ref 5–25)
CALCIUM SERPL-MCNC: 9.2 MG/DL (ref 8.4–10.2)
CHLORIDE SERPL-SCNC: 104 MMOL/L (ref 96–108)
CHOLEST SERPL-MCNC: 152 MG/DL
CO2 SERPL-SCNC: 30 MMOL/L (ref 21–32)
CREAT SERPL-MCNC: 1.05 MG/DL (ref 0.6–1.3)
EOSINOPHIL # BLD AUTO: 0.18 THOUSAND/ÂΜL (ref 0–0.61)
EOSINOPHIL NFR BLD AUTO: 4 % (ref 0–6)
ERYTHROCYTE [DISTWIDTH] IN BLOOD BY AUTOMATED COUNT: 13.1 % (ref 11.6–15.1)
GFR SERPL CREATININE-BSD FRML MDRD: 69 ML/MIN/1.73SQ M
GLUCOSE P FAST SERPL-MCNC: 85 MG/DL (ref 65–99)
HCT VFR BLD AUTO: 40.5 % (ref 36.5–49.3)
HDLC SERPL-MCNC: 55 MG/DL
HGB BLD-MCNC: 13.5 G/DL (ref 12–17)
IMM GRANULOCYTES # BLD AUTO: 0.03 THOUSAND/UL (ref 0–0.2)
IMM GRANULOCYTES NFR BLD AUTO: 1 % (ref 0–2)
LDLC SERPL CALC-MCNC: 75 MG/DL (ref 0–100)
LYMPHOCYTES # BLD AUTO: 1.3 THOUSANDS/ÂΜL (ref 0.6–4.47)
LYMPHOCYTES NFR BLD AUTO: 26 % (ref 14–44)
MCH RBC QN AUTO: 30.4 PG (ref 26.8–34.3)
MCHC RBC AUTO-ENTMCNC: 33.3 G/DL (ref 31.4–37.4)
MCV RBC AUTO: 91 FL (ref 82–98)
MONOCYTES # BLD AUTO: 0.63 THOUSAND/ÂΜL (ref 0.17–1.22)
MONOCYTES NFR BLD AUTO: 13 % (ref 4–12)
NEUTROPHILS # BLD AUTO: 2.75 THOUSANDS/ÂΜL (ref 1.85–7.62)
NEUTS SEG NFR BLD AUTO: 55 % (ref 43–75)
NONHDLC SERPL-MCNC: 97 MG/DL
NRBC BLD AUTO-RTO: 0 /100 WBCS
PLATELET # BLD AUTO: 257 THOUSANDS/UL (ref 149–390)
PMV BLD AUTO: 9.6 FL (ref 8.9–12.7)
POTASSIUM SERPL-SCNC: 4.5 MMOL/L (ref 3.5–5.3)
PROT SERPL-MCNC: 7.3 G/DL (ref 6.4–8.4)
RBC # BLD AUTO: 4.44 MILLION/UL (ref 3.88–5.62)
SODIUM SERPL-SCNC: 138 MMOL/L (ref 135–147)
T4 FREE SERPL-MCNC: 0.79 NG/DL (ref 0.61–1.12)
TRIGL SERPL-MCNC: 108 MG/DL
TSH SERPL DL<=0.05 MIU/L-ACNC: 5.16 UIU/ML (ref 0.45–4.5)
WBC # BLD AUTO: 4.95 THOUSAND/UL (ref 4.31–10.16)

## 2023-09-23 PROCEDURE — 85025 COMPLETE CBC W/AUTO DIFF WBC: CPT

## 2023-09-23 PROCEDURE — 80061 LIPID PANEL: CPT

## 2023-09-23 PROCEDURE — 80053 COMPREHEN METABOLIC PANEL: CPT

## 2023-09-23 PROCEDURE — 86803 HEPATITIS C AB TEST: CPT

## 2023-09-23 PROCEDURE — 71046 X-RAY EXAM CHEST 2 VIEWS: CPT

## 2023-09-23 PROCEDURE — 36415 COLL VENOUS BLD VENIPUNCTURE: CPT

## 2023-09-23 PROCEDURE — 84439 ASSAY OF FREE THYROXINE: CPT

## 2023-09-23 PROCEDURE — 84443 ASSAY THYROID STIM HORMONE: CPT

## 2023-09-24 LAB — HCV AB SER QL: NORMAL

## 2023-09-26 ENCOUNTER — OFFICE VISIT (OUTPATIENT)
Dept: FAMILY MEDICINE CLINIC | Facility: CLINIC | Age: 73
End: 2023-09-26
Payer: COMMERCIAL

## 2023-09-26 VITALS
SYSTOLIC BLOOD PRESSURE: 140 MMHG | HEIGHT: 74 IN | WEIGHT: 186.2 LBS | OXYGEN SATURATION: 99 % | BODY MASS INDEX: 23.9 KG/M2 | DIASTOLIC BLOOD PRESSURE: 80 MMHG | TEMPERATURE: 96.6 F | HEART RATE: 87 BPM

## 2023-09-26 DIAGNOSIS — I10 ESSENTIAL HYPERTENSION: ICD-10-CM

## 2023-09-26 DIAGNOSIS — G47.00 INSOMNIA, UNSPECIFIED TYPE: ICD-10-CM

## 2023-09-26 DIAGNOSIS — Z86.73 HISTORY OF HEMORRHAGIC CEREBROVASCULAR ACCIDENT (CVA) WITHOUT RESIDUAL DEFICITS: ICD-10-CM

## 2023-09-26 DIAGNOSIS — F32.4 MAJOR DEPRESSIVE DISORDER IN PARTIAL REMISSION, UNSPECIFIED WHETHER RECURRENT (HCC): ICD-10-CM

## 2023-09-26 DIAGNOSIS — G62.9 NEUROPATHY: ICD-10-CM

## 2023-09-26 DIAGNOSIS — Z00.00 ANNUAL PHYSICAL EXAM: Primary | ICD-10-CM

## 2023-09-26 PROCEDURE — 99397 PER PM REEVAL EST PAT 65+ YR: CPT | Performed by: FAMILY MEDICINE

## 2023-09-26 RX ORDER — PYRIDOXINE HCL (VITAMIN B6) 50 MG
50 TABLET ORAL DAILY
Qty: 90 TABLET | Refills: 0 | Status: SHIPPED | OUTPATIENT
Start: 2023-09-26 | End: 2023-12-25

## 2023-09-26 RX ORDER — LOSARTAN POTASSIUM 50 MG/1
50 TABLET ORAL 2 TIMES DAILY
Qty: 180 TABLET | Refills: 0 | Status: SHIPPED | OUTPATIENT
Start: 2023-09-26 | End: 2023-12-25

## 2023-09-26 RX ORDER — ESCITALOPRAM OXALATE 5 MG/1
5 TABLET ORAL
Qty: 90 TABLET | Refills: 0 | Status: SHIPPED | OUTPATIENT
Start: 2023-09-26 | End: 2023-12-25

## 2023-09-26 RX ORDER — CHOLECALCIFEROL (VITAMIN D3) 125 MCG
10 CAPSULE ORAL
Qty: 180 TABLET | Refills: 0 | Status: SHIPPED | OUTPATIENT
Start: 2023-09-26 | End: 2023-12-25

## 2023-09-26 RX ORDER — AMLODIPINE BESYLATE 5 MG/1
5 TABLET ORAL DAILY
Qty: 90 TABLET | Refills: 0 | Status: SHIPPED | OUTPATIENT
Start: 2023-09-26 | End: 2023-12-25

## 2023-09-26 RX ORDER — ATORVASTATIN CALCIUM 10 MG/1
10 TABLET, FILM COATED ORAL
Qty: 90 TABLET | Refills: 0 | Status: SHIPPED | OUTPATIENT
Start: 2023-09-26 | End: 2023-12-25

## 2023-09-26 RX ORDER — LABETALOL 200 MG/1
200 TABLET, FILM COATED ORAL EVERY 12 HOURS
Qty: 180 TABLET | Refills: 0 | Status: SHIPPED | OUTPATIENT
Start: 2023-09-26 | End: 2023-12-25

## 2023-09-26 RX ORDER — LANOLIN ALCOHOL/MO/W.PET/CERES
500 CREAM (GRAM) TOPICAL DAILY
Qty: 90 CAPSULE | Refills: 0 | Status: SHIPPED | OUTPATIENT
Start: 2023-09-26 | End: 2023-12-25

## 2023-09-26 RX ORDER — ASPIRIN 81 MG/1
81 TABLET ORAL DAILY
Qty: 90 TABLET | Refills: 3 | Status: SHIPPED | OUTPATIENT
Start: 2023-09-26 | End: 2023-10-05

## 2023-09-28 PROBLEM — R19.8 CHANGE IN BOWEL MOVEMENT: Status: RESOLVED | Noted: 2021-01-04 | Resolved: 2023-09-28

## 2023-09-28 PROBLEM — R10.9 ABDOMINAL PAIN: Status: RESOLVED | Noted: 2020-12-25 | Resolved: 2023-09-28

## 2023-09-28 PROBLEM — Z01.818 PREOPERATIVE CLEARANCE: Status: RESOLVED | Noted: 2021-07-12 | Resolved: 2023-09-28

## 2023-09-28 PROBLEM — K59.00 OBSTIPATION: Status: RESOLVED | Noted: 2020-12-25 | Resolved: 2023-09-28

## 2023-09-28 NOTE — PROGRESS NOTES
[id number]    NAME: Steve Patel  AGE: 74 y.o. SEX: male  : 1950     DATE: 2023     Assessment and Plan:     Problem List Items Addressed This Visit        Cardiovascular and Mediastinum    Essential hypertension    Relevant Medications    labetalol (NORMODYNE) 200 mg tablet    losartan (COZAAR) 50 mg tablet    amLODIPine (NORVASC) 5 mg tablet       Other    History of hemorrhagic cerebrovascular accident (CVA) without residual deficits     No FND  - Continue ASA 81 mg, Lipitor         Relevant Medications    atorvastatin (LIPITOR) 10 mg tablet    aspirin (Aspirin 81) 81 mg EC tablet   Other Visit Diagnoses     Annual physical exam    -  Primary    Insomnia, unspecified type        Relevant Medications    melatonin 5 MG TABS    Neuropathy        Relevant Medications    vitamin B-12 (CYANOCOBALAMIN) 50 MCG TABS    Ascorbic Acid (Vitamin C ER) 500 MG CPCR    Major depressive disorder in partial remission, unspecified whether recurrent (HCC)        Relevant Medications    escitalopram (LEXAPRO) 5 mg tablet        Immunizations and preventive care screenings were discussed with patient today. Appropriate education was printed on patient's after visit summary. Counseling:  Alcohol/drug use: discussed moderation in alcohol intake, the recommendations for healthy alcohol use, and avoidance of illicit drug use. Dental Health: discussed importance of regular tooth brushing, flossing, and dental visits. Injury prevention: discussed safety/seat belts, safety helmets, smoke detectors, carbon dioxide detectors, and smoking near bedding or upholstery. Exercise: the importance of regular exercise/physical activity was discussed. Recommend exercise 3-5 times per week for at least 30 minutes. Depression Screening and Follow-up Plan: Patient was screened for depression during today's encounter.  They screened negative with a PHQ-2 score of 0. No follow-ups on file. Chief Complaint:     Chief Complaint   Patient presents with   • Physical Exam     Patient is here today for CPE, no concerns at this time. History of Present Illness:     Adult Annual Physical   Patient here for a comprehensive physical exam. The patient reports no problems. Daughter Rocky Galan at bedside for translation. Some difficulties with sleep. Med refills needed. Otherwise doing well. Diet and Physical Activity  Diet/Nutrition: Vegetarian. Exercise: walking. Depression Screening  PHQ-2/9 Depression Screening    Little interest or pleasure in doing things: 0 - not at all  Feeling down, depressed, or hopeless: 0 - not at all  PHQ-2 Score: 0  PHQ-2 Interpretation: Negative depression screen       General Health  Sleep: gets 4-6 hours of sleep on average. Hearing: normal - bilateral.  Vision: no vision problems. Dental: no dental visits for >1 year.  Health  Symptoms include: none     Review of Systems:     Review of Systems   Constitutional: Negative for fever. HENT: Negative for ear pain and sore throat. Eyes: Negative for pain. Respiratory: Negative for cough and shortness of breath. Cardiovascular: Negative for chest pain. Gastrointestinal: Negative for abdominal pain, nausea and vomiting. Genitourinary: Negative for dysuria and hematuria. Musculoskeletal: Negative for back pain. Skin: Negative for rash. Neurological: Negative for dizziness and headaches. All other systems reviewed and are negative.      Past Medical History:     Past Medical History:   Diagnosis Date   • Anxiety    • Arthritis    • 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 ([address])    • Hyperlipidemia    • Stroke [facility]) 2005   • Systemic inflammatory response syndrome ([address]) 12/25/2020      Past Surgical History:     Past Surgical History:   Procedure Laterality Date   • HERNIA REPAIR     • AL ARTHRP KNE CONDYLE&PLATU MEDIAL&LAT COMPARTMENTS Right 4/5/2021 Procedure: ARTHROPLASTY KNEE TOTAL;  Surgeon: Liseth Harris DO;  Location: [facility];  Service: Orthopedics   • PA ARTHRP KNE CONDYLE&PLATU MEDIAL&LAT COMPARTMENTS Left 7/13/2021    Procedure: ARTHROPLASTY KNEE TOTAL;  Surgeon: Liseth Harris DO;  Location: [facility];  Service: Orthopedics      Family History:     Family History   Problem Relation Age of Onset   • Stroke Mother    • Stroke Father       Social History:     Social History     Socioeconomic History   • Marital status: /Civil Union     Spouse name: None   • Number of children: None   • Years of education: None   • Highest education level: None   Occupational History   • None   Tobacco Use   • Smoking status: Never   • Smokeless tobacco: Never   Vaping Use   • Vaping Use: Never used   Substance and Sexual Activity   • Alcohol use: Never   • Drug use: Never   • Sexual activity: Not Currently   Other Topics Concern   • None   Social History Narrative   • None     Social Determinants of Health     Financial Resource Strain: Not on file   Food Insecurity: Not on file   Transportation Needs: Not on file   Physical Activity: Not on file   Stress: Not on file   Social Connections: Not on file   Intimate Partner Violence: Not on file   Housing Stability: Not on file      Current Medications:     Current Outpatient Medications   Medication Sig Dispense Refill   • acetaminophen (TYLENOL) 325 mg tablet Take 3 tablets (975 mg total) by mouth every 8 (eight) hours  0   • amLODIPine (NORVASC) 5 mg tablet Take 1 tablet (5 mg total) by mouth daily 90 tablet 0   • ammonium lactate (LAC-HYDRIN) 12 % lotion Apply topically 2 (two) times a day as needed for dry skin 400 g 0   • Ascorbic Acid (Vitamin C ER) 500 MG CPCR Take 1 capsule (500 mg total) by mouth in the morning 90 capsule 0   • aspirin (Aspirin 81) 81 mg EC tablet Take 1 tablet (81 mg total) by mouth daily 90 tablet 3   • atorvastatin (LIPITOR) 10 mg tablet Take 1 tablet (10 mg total) by mouth daily with dinner 90 tablet 0   • escitalopram (LEXAPRO) 5 mg tablet Take 1 tablet (5 mg total) by mouth daily at bedtime 90 tablet 0   • labetalol (NORMODYNE) 200 mg tablet Take 1 tablet (200 mg total) by mouth every 12 (twelve) hours 180 tablet 0   • losartan (COZAAR) 50 mg tablet Take 1 tablet (50 mg total) by mouth 2 (two) times a day 180 tablet 0   • melatonin 5 MG TABS Take 2 tablets (10 mg total) by mouth daily at bedtime 180 tablet 0   • vitamin B-12 (CYANOCOBALAMIN) 50 MCG TABS Take 1 tablet (50 mcg total) by mouth daily 90 tablet 0     No current facility-administered medications for this visit. Allergies:     No Known Allergies   Physical Exam:     /80 (BP Location: Left arm, Patient Position: Sitting, Cuff Size: Adult)   Pulse 87   Temp (!) 96.6 °F (35.9 °C) (Tympanic)   Ht 6' 2" (1.88 m)   Wt 84.5 kg (186 lb 3.2 oz)   SpO2 99%   BMI 23.91 kg/m²     Physical Exam  Vitals reviewed. Constitutional:       Appearance: Normal appearance. He is not ill-appearing. HENT:      Head: Normocephalic. Right Ear: External ear normal.      Left Ear: External ear normal.      Mouth/Throat:      Mouth: Mucous membranes are moist.   Eyes:      Extraocular Movements: Extraocular movements intact. Pupils: Pupils are equal, round, and reactive to light. Cardiovascular:      Rate and Rhythm: Normal rate and regular rhythm. Pulses: Normal pulses. Heart sounds: Normal heart sounds. No murmur heard. No gallop. Pulmonary:      Effort: Pulmonary effort is normal. No respiratory distress. Breath sounds: Normal breath sounds. No wheezing. Abdominal:      Palpations: Abdomen is soft. Tenderness: There is no abdominal tenderness. There is no guarding. Musculoskeletal:         General: No swelling. Normal range of motion. Cervical back: Normal range of motion. Skin:     General: Skin is warm and dry. Neurological:      Mental Status: He is alert. Motor: No weakness. Angelita Collet, MD  [id number]

## 2023-10-05 ENCOUNTER — OFFICE VISIT (OUTPATIENT)
Dept: CARDIOLOGY CLINIC | Facility: CLINIC | Age: 73
End: 2023-10-05
Payer: COMMERCIAL

## 2023-10-05 ENCOUNTER — TELEPHONE (OUTPATIENT)
Dept: CARDIOLOGY CLINIC | Facility: CLINIC | Age: 73
End: 2023-10-05

## 2023-10-05 VITALS
BODY MASS INDEX: 23.87 KG/M2 | WEIGHT: 186 LBS | SYSTOLIC BLOOD PRESSURE: 130 MMHG | OXYGEN SATURATION: 97 % | DIASTOLIC BLOOD PRESSURE: 70 MMHG | HEART RATE: 74 BPM | HEIGHT: 74 IN

## 2023-10-05 DIAGNOSIS — I48.91 ATRIAL FIBRILLATION, UNSPECIFIED TYPE (HCC): ICD-10-CM

## 2023-10-05 DIAGNOSIS — I11.9 HYPERTENSIVE HEART DISEASE WITHOUT HEART FAILURE: ICD-10-CM

## 2023-10-05 DIAGNOSIS — R94.31 ABNORMAL ELECTROCARDIOGRAM (ECG) (EKG): ICD-10-CM

## 2023-10-05 DIAGNOSIS — E78.2 MIXED HYPERLIPIDEMIA: ICD-10-CM

## 2023-10-05 DIAGNOSIS — Z96.651 STATUS POST TOTAL KNEE REPLACEMENT USING CEMENT, RIGHT: ICD-10-CM

## 2023-10-05 DIAGNOSIS — Z86.73 HISTORY OF STROKE: ICD-10-CM

## 2023-10-05 DIAGNOSIS — F41.9 ANXIETY: ICD-10-CM

## 2023-10-05 PROCEDURE — 99215 OFFICE O/P EST HI 40 MIN: CPT | Performed by: INTERNAL MEDICINE

## 2023-10-05 PROCEDURE — 93000 ELECTROCARDIOGRAM COMPLETE: CPT | Performed by: INTERNAL MEDICINE

## 2023-10-05 RX ORDER — LANOLIN ALCOHOL/MO/W.PET/CERES
CREAM (GRAM) TOPICAL
COMMUNITY
Start: 2023-09-26

## 2023-10-05 NOTE — PROGRESS NOTES
Consultation - Cardiology Office  [facility]. Eufemia Lam 74 y.o. male MRN: [id number]  : 1950  Unit/Bed#:  Encounter: [id number]      Assessment:     1. Atrial fibrillation, unspecified type ([address])    2. Hypertensive heart disease without heart failure    3. Mixed hyperlipidemia    4. History of stroke    5. Abnormal electrocardiogram (ECG) (EKG)    6. Anxiety    7. Status post total knee replacement using cement, right        Discussion summary and Plan:    1. Newly noted A-fib with controlled ventricular rate of unknown duration. Patient is newly noted to have atrial fibrillation. Previous EKG he was in sinus rhythm. He does not feel palpitations or racing of the heart. He has no issues with bleeding. He did have history of stroke in the family and previous history of stroke. At this time we will start him on Eliquis 5 mg twice a day. We will check echo Doppler for LV function and Holter monitor to see his average heart rate as well as Lexiscan stress test.    2.  Hypertensive heart disease without heart failure. Blood pressure is acceptable he is on Normodyne, amlodipine 5 mg daily, losartan 50 mg daily. 3.  Dyslipidemia. Continue statin blood test in 2023 showed cholesterol profile acceptable. 4.  Personal history of stroke as well as family history of stroke. Issues discussed with patient and patient's daughter and started on Eliquis 5 mg twice a day with food    5. Abnormal EKG abnormal EKG noted we will check Lexiscan stress test as he cannot walk on treadmill due to history of knee replacement surgery and previous history of stroke. 6.  History of knee replacement surgery. Management as per orthopedics. 7.  History of anxiety. Currently stable.       Patient and patient's daughter was advised and educated to call our office  immediately if  patient has any new symptoms of chest pain/shortness of breath, near-syncope, syncope, light headedness sustained palpitations or any other cardiovascular symptoms before their scheduled follow-up appointment. Office number was provided #[phone]. Thank you for your consultation. If you have any question please call me at [phone]    Counseling :  A description of the counseling. Goals and Barriers. Patient's ability to self care: Yes  Medication side effect reviewed with patient in detail and all their questions answered to their satisfaction. Primary Care Physician Requesting Consult: Donald Huang MD    Reason for Consult / Principal Problem: Hypertensive heart disease. HPI :     Sheri Waters is a 74y.o. year old male who was referred by primary care doctor for difficult control blood pressure. Patient has known history of hypertensive heart disease, dyslipidemia, history of DJD with knee replacement surgery. Patient was first diagnosed to have history of stroke because himself difficult to speak in 2005 and found to have high blood pressure. He was prescribed medication then it was stopped later on he was found to have very high blood pressure and put on these 3 medications. His blood pressure has been better today. He generally has better readings as per the epic note. His EKG shows atrial fibrillation with heart rate 74 bpm.  Atrial fibrillation is newly noted. His last EKG in [facility] in 2020 shows he was in sinus rhythm. He does not feel much difference he does exercise and other issues without any problem. EKG also shows T wave inversions in inferior leads. He does have history of stroke in the past.  He had strong family history of stroke his both parents had history of stroke did did not do more detail. His past surgical is significant for bilateral knee replacement surgery and hernia surgery. Review of Systems   Constitutional: Negative for activity change, chills, diaphoresis, fever and unexpected weight change. HENT: Negative for congestion.     Eyes: Negative for discharge and redness. Respiratory: Negative for cough, chest tightness, shortness of breath and wheezing. Cardiovascular: Negative. Negative for chest pain, palpitations and leg swelling. Gastrointestinal: Negative for abdominal pain, diarrhea and nausea. Endocrine: Negative. Genitourinary: Negative for decreased urine volume and urgency. Musculoskeletal: Positive for arthralgias and gait problem. Negative for back pain. Skin: Negative for rash and wound. Allergic/Immunologic: Negative. Neurological: Negative for dizziness, seizures, syncope, weakness, light-headedness and headaches. History of CVA   Hematological: Negative. No issues with bleeding   Psychiatric/Behavioral: Negative for agitation and confusion. The patient is not nervous/anxious.         Historical Information   Past Medical History:   Diagnosis Date   • Anxiety    • Arthritis    • 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     • Hyperlipidemia    • Stroke [facility]) 2005   • Systemic inflammatory response syndrome ([address] [facility]) 12/25/2020     Past Surgical History:   Procedure Laterality Date   • HERNIA REPAIR     • CA ARTHRP KNE CONDYLE&PLATU MEDIAL&LAT COMPARTMENTS Right 4/5/2021    Procedure: ARTHROPLASTY KNEE TOTAL;  Surgeon: Amarilis Bagley DO;  Location: [facility];  Service: Orthopedics   • CA ARTHRP KNE CONDYLE&PLATU MEDIAL&LAT COMPARTMENTS Left 7/13/2021    Procedure: ARTHROPLASTY KNEE TOTAL;  Surgeon: Amarilis Bagley DO;  Location: [facility];  Service: Orthopedics     Social History     Substance and Sexual Activity   Alcohol Use Never     Social History     Substance and Sexual Activity   Drug Use Never     Social History     Tobacco Use   Smoking Status Never   Smokeless Tobacco Never     Family History:   Family History   Problem Relation Age of Onset   • Stroke Mother    • Stroke Father        Meds/Allergies     No Known Allergies    Current Outpatient Medications:   •  acetaminophen (TYLENOL) 325 mg tablet, Take 3 tablets (975 mg total) by mouth every 8 (eight) hours, Disp: , Rfl: 0  •  amLODIPine (NORVASC) 5 mg tablet, Take 1 tablet (5 mg total) by mouth daily, Disp: 90 tablet, Rfl: 0  •  ammonium lactate (LAC-HYDRIN) 12 % lotion, Apply topically 2 (two) times a day as needed for dry skin, Disp: 400 g, Rfl: 0  •  apixaban (Eliquis) 5 mg, Take 1 tablet (5 mg total) by mouth 2 (two) times a day, Disp: 60 tablet, Rfl: 5  •  Ascorbic Acid (Vitamin C ER) 500 MG CPCR, Take 1 capsule (500 mg total) by mouth in the morning, Disp: 90 capsule, Rfl: 0  •  atorvastatin (LIPITOR) 10 mg tablet, Take 1 tablet (10 mg total) by mouth daily with dinner, Disp: 90 tablet, Rfl: 0  •  escitalopram (LEXAPRO) 5 mg tablet, Take 1 tablet (5 mg total) by mouth daily at bedtime, Disp: 90 tablet, Rfl: 0  •  labetalol (NORMODYNE) 200 mg tablet, Take 1 tablet (200 mg total) by mouth every 12 (twelve) hours, Disp: 180 tablet, Rfl: 0  •  losartan (COZAAR) 50 mg tablet, Take 1 tablet (50 mg total) by mouth 2 (two) times a day, Disp: 180 tablet, Rfl: 0  •  melatonin 5 MG TABS, Take 2 tablets (10 mg total) by mouth daily at bedtime, Disp: 180 tablet, Rfl: 0  •  vitamin B-12 (CYANOCOBALAMIN) 50 MCG TABS, Take 1 tablet (50 mcg total) by mouth daily, Disp: 90 tablet, Rfl: 0  •  Ascorbic Acid (Vitamin C ER) 500 MG CPCR, , Disp: , Rfl:     Vitals: Blood pressure 130/70, pulse 74, height 6' 2" (1.88 m), weight 84.4 kg (186 lb), SpO2 97 %. ?  Body mass index is 23.88 kg/m².   Wt Readings from Last 3 Encounters:   10/05/23 84.4 kg (186 lb)   09/26/23 84.5 kg (186 lb 3.2 oz)   09/15/22 78.9 kg (174 lb)     Vitals:    10/05/23 1448   Weight: 84.4 kg (186 lb)     BP Readings from Last 3 Encounters:   10/05/23 130/70   09/26/23 140/80   09/15/22 128/82         Physical Exam    Neurologic:  Alert & oriented x 3, no new focal deficits, Not in any acute distress,  Constitutional:  Adequate built, non-toxic appearance   Neck: Normal range of motion, no tenderness,  Neck supple   Respiratory:  Bilateral air entry, mostly clear to auscultation  Cardiovascular: S1-S2 grossly irregular  GI:  Soft, nondistended,  nontender, no hepatosplenomegaly appreciated. Musculoskeletal:  No edema, no tenderness, no deformities. Skin:  Well hydrated, no rash   Extremities:  No edema and distal pulses are present  Psychiatric:  Speech and behavior appropriate     Diagnostic Studies Review Cardio:  None recently    EKG:  Twelve-lead EKG 10/5/2023 atrial fibrillation with heart rate 74 bpm.  As compared to previous EKG available in epic in 2020 atrial fibrillation is newly noted. XR chest pa & lateral    Result Date: 9/29/2023  Narrative: CHEST INDICATION:   R05.2: Subacute cough. COMPARISON: Chest x-ray 3/23/2021 EXAM PERFORMED/VIEWS:  XR CHEST PA & LATERAL The frontal view was performed utilizing dual energy radiographic technique. Images: 4 FINDINGS: Cardiomediastinal silhouette appears unremarkable. The lungs are clear. No pneumothorax or pleural effusion. Chronic left clavicle fracture. Degenerative changes of spine. Impression: No acute cardiopulmonary disease.  Workstation performed: [id number]       Lab Review   Lab Results   Component Value Date    WBC 4.95 09/23/2023    HGB 13.5 09/23/2023    HCT 40.5 09/23/2023    MCV 91 09/23/2023    RDW 13.1 09/23/2023     09/23/2023     BMP:  Lab Results   Component Value Date    SODIUM 138 09/23/2023    K 4.5 09/23/2023     09/23/2023    CO2 30 09/23/2023    BUN 21 09/23/2023    CREATININE 1.05 09/23/2023    GLUC 109 (H) 09/02/2021    GLUF 85 09/23/2023    CALCIUM 9.2 09/23/2023    CORRECTEDCA 9.0 03/23/2021    EGFR 69 09/23/2023    MG 2.1 12/28/2020     LFT:  Lab Results   Component Value Date    AST 21 09/23/2023    ALT 16 09/23/2023    ALKPHOS 71 09/23/2023    TP 7.3 09/23/2023    ALB 4.0 09/23/2023      Lab Results   Component Value Date    THE5RKSKXMUD 5.157 (H) 09/23/2023     No components found for: "TSH3"  Lab Results   Component Value Date    HGBA1C 5.7 (H) 09/03/2022     Lipid Profile:   Lab Results   Component Value Date    CHOLESTEROL 152 09/23/2023    HDL 55 09/23/2023    LDLCALC 75 09/23/2023    TRIG 108 09/23/2023     Lab Results   Component Value Date    CHOLESTEROL 152 09/23/2023    CHOLESTEROL 110 09/03/2022             Dr. Jesus Fink MD [facility]      "This note was completed in part utilizing m-modal fluency direct voice recognition software. Grammatical errors, random word insertion, spelling mistakes, and incomplete sentences may be an occasional consequence of the system secondary to software limitations, ambient noise and hardware issues. Please read the chart carefully and recognize, using context, where substitutions have occurred.   If you have any questions or concerns about the context, text or information contained within the body of this dictation, please contact myself, the provider, for further clarification."

## 2023-10-20 ENCOUNTER — HOSPITAL ENCOUNTER (OUTPATIENT)
Dept: NON INVASIVE DIAGNOSTICS | Facility: HOSPITAL | Age: 73
Discharge: HOME | End: 2023-10-20
Attending: INTERNAL MEDICINE
Payer: COMMERCIAL

## 2023-10-20 ENCOUNTER — HOSPITAL ENCOUNTER (OUTPATIENT)
Dept: RADIOLOGY | Facility: HOSPITAL | Age: 73
Discharge: HOME | End: 2023-10-20
Attending: INTERNAL MEDICINE
Payer: COMMERCIAL

## 2023-10-20 ENCOUNTER — TELEPHONE (OUTPATIENT)
Dept: CARDIOLOGY CLINIC | Facility: CLINIC | Age: 73
End: 2023-10-20

## 2023-10-20 VITALS
HEART RATE: 74 BPM | SYSTOLIC BLOOD PRESSURE: 150 MMHG | WEIGHT: 186 LBS | DIASTOLIC BLOOD PRESSURE: 90 MMHG | OXYGEN SATURATION: 100 % | HEIGHT: 74 IN | BODY MASS INDEX: 23.87 KG/M2

## 2023-10-20 VITALS
BODY MASS INDEX: 23.87 KG/M2 | HEIGHT: 74 IN | WEIGHT: 186 LBS | HEART RATE: 87 BPM | DIASTOLIC BLOOD PRESSURE: 80 MMHG | SYSTOLIC BLOOD PRESSURE: 140 MMHG

## 2023-10-20 DIAGNOSIS — Z96.651 STATUS POST TOTAL KNEE REPLACEMENT USING CEMENT, RIGHT: ICD-10-CM

## 2023-10-20 DIAGNOSIS — E78.2 MIXED HYPERLIPIDEMIA: ICD-10-CM

## 2023-10-20 DIAGNOSIS — F41.9 ANXIETY: ICD-10-CM

## 2023-10-20 DIAGNOSIS — I11.9 HYPERTENSIVE HEART DISEASE WITHOUT HEART FAILURE: ICD-10-CM

## 2023-10-20 DIAGNOSIS — Z86.73 HISTORY OF STROKE: ICD-10-CM

## 2023-10-20 DIAGNOSIS — I48.91 ATRIAL FIBRILLATION, UNSPECIFIED TYPE (HCC): ICD-10-CM

## 2023-10-20 DIAGNOSIS — R94.31 ABNORMAL ELECTROCARDIOGRAM (ECG) (EKG): ICD-10-CM

## 2023-10-20 LAB
AORTIC ROOT: 3.2 CM
APICAL FOUR CHAMBER EJECTION FRACTION: 55 %
AV LVOT PEAK GRADIENT: 4 MMHG
AV PEAK GRADIENT: 7 MMHG
E WAVE DECELERATION TIME: 142 MS
E/A RATIO: 94
FRACTIONAL SHORTENING: 30 (ref 28–44)
INTERVENTRICULAR SEPTUM IN DIASTOLE (PARASTERNAL SHORT AXIS VIEW): 1.2 CM
INTERVENTRICULAR SEPTUM: 1.2 CM (ref 0.6–1.1)
LAAS-AP2: 31.7 CM2
LAAS-AP4: 29 CM2
LEFT ATRIUM SIZE: 4.2 CM
LEFT ATRIUM VOLUME (MOD BIPLANE): 106 ML
LEFT ATRIUM VOLUME INDEX (MOD BIPLANE): 50.2 ML/M2
LEFT INTERNAL DIMENSION IN SYSTOLE: 3.3 CM (ref 2.1–4)
LEFT VENTRICULAR INTERNAL DIMENSION IN DIASTOLE: 4.7 CM (ref 3.5–6)
LEFT VENTRICULAR POSTERIOR WALL IN END DIASTOLE: 1.1 CM
LEFT VENTRICULAR STROKE VOLUME: 57 ML
LVSV (TEICH): 57 ML
MAX HR PERCENT: 77 %
MAX HR: 112 BPM
MV E'TISSUE VEL-SEP: 8 CM/S
MV PEAK A VEL: 0.01 M/S
MV PEAK E VEL: 94 CM/S
MV STENOSIS PRESSURE HALF TIME: 41 MS
MV VALVE AREA P 1/2 METHOD: 5.37
NUC STRESS EJECTION FRACTION: 63 %
RATE PRESSURE PRODUCT: NORMAL
RIGHT ATRIUM AREA SYSTOLE A4C: 24.5 CM2
RIGHT VENTRICLE ID DIMENSION: 3.6 CM
SL CV LEFT ATRIUM LENGTH A2C: 6.8 CM
SL CV PED ECHO LEFT VENTRICLE DIASTOLIC VOLUME (MOD BIPLANE) 2D: 101 ML
SL CV PED ECHO LEFT VENTRICLE SYSTOLIC VOLUME (MOD BIPLANE) 2D: 43 ML
SL CV REST NUCLEAR ISOTOPE DOSE: 11 MCI
SL CV STRESS NUCLEAR ISOTOPE DOSE: 32.9 MCI
SL CV STRESS RECOVERY BP: 148 MMHG
SL CV STRESS RECOVERY HR: 86 BPM
SL CV STRESS RECOVERY O2 SAT: 100 %
STRESS ANGINA INDEX: 0
STRESS BASELINE BP: NORMAL MMHG
STRESS BASELINE HR: 74 BPM
STRESS O2 SAT REST: 100 %
STRESS PEAK HR: 110 BPM
STRESS POST ESTIMATED WORKLOAD: 1.6 METS
STRESS POST EXERCISE DUR MIN: 3 MIN
STRESS POST O2 SAT PEAK: 100 %
STRESS POST PEAK BP: 160 MMHG
STRESS/REST PERFUSION RATIO: 0.96
TR MAX PG: 32 MMHG
TR PEAK VELOCITY: 2.8 M/S
TRICUSPID ANNULAR PLANE SYSTOLIC EXCURSION: 1.9 CM
TRICUSPID VALVE PEAK REGURGITATION VELOCITY: 2.84 M/S

## 2023-10-20 PROCEDURE — 78452 HT MUSCLE IMAGE SPECT MULT: CPT

## 2023-10-20 PROCEDURE — A9502 TC99M TETROFOSMIN: HCPCS

## 2023-10-20 PROCEDURE — G1004 CDSM NDSC: HCPCS

## 2023-10-20 PROCEDURE — 93017 CV STRESS TEST TRACING ONLY: CPT

## 2023-10-20 PROCEDURE — 93306 TTE W/DOPPLER COMPLETE: CPT

## 2023-10-20 RX ORDER — REGADENOSON 0.08 MG/ML
0.4 INJECTION, SOLUTION INTRAVENOUS ONCE
Status: COMPLETED | OUTPATIENT
Start: 2023-10-20 | End: 2023-10-20

## 2023-10-20 RX ADMIN — REGADENOSON 0.4 MG: 0.08 INJECTION, SOLUTION INTRAVENOUS at 09:02

## 2023-10-20 NOTE — TELEPHONE ENCOUNTER
----- Message from Wendi Miller MD sent at 10/20/2023 10:31 AM EDT -----  Patient's echo shows normal LV systolic function. No significant, to mild valvular disease. Patient can keep an appointment. Please call patient about echo report.

## 2023-10-20 NOTE — TELEPHONE ENCOUNTER
----- Message from Jesus Fink MD sent at 10/20/2023  4:26 PM EDT -----  Pt's Patient's stress test is normal.   Patient can keep regular appointment. Please call patient with the result.

## 2023-10-20 NOTE — RESULT ENCOUNTER NOTE
Pt's Patient's stress test is normal.   Patient can keep regular appointment. Please call patient with the result.

## 2023-10-23 LAB
CHEST PAIN STATEMENT: NORMAL
MAX DIASTOLIC BP: 90 MMHG
MAX HEART RATE: 112 BPM
MAX PREDICTED HEART RATE: 145 BPM
MAX. SYSTOLIC BP: 160 MMHG
PROTOCOL NAME: NORMAL
REASON FOR TERMINATION: NORMAL
TARGET HR FORMULA: NORMAL
TEST INDICATION: NORMAL
TIME IN EXERCISE PHASE: NORMAL

## 2023-11-06 ENCOUNTER — TELEPHONE (OUTPATIENT)
Dept: CARDIOLOGY CLINIC | Facility: CLINIC | Age: 73
End: 2023-11-06

## 2023-11-06 DIAGNOSIS — I48.91 ATRIAL FIBRILLATION, UNSPECIFIED TYPE (HCC): ICD-10-CM

## 2024-01-03 ENCOUNTER — OFFICE VISIT (OUTPATIENT)
Dept: CARDIOLOGY CLINIC | Facility: CLINIC | Age: 74
End: 2024-01-03
Payer: COMMERCIAL

## 2024-01-03 VITALS
DIASTOLIC BLOOD PRESSURE: 76 MMHG | HEIGHT: 74 IN | SYSTOLIC BLOOD PRESSURE: 130 MMHG | WEIGHT: 184 LBS | HEART RATE: 71 BPM | OXYGEN SATURATION: 97 % | BODY MASS INDEX: 23.61 KG/M2

## 2024-01-03 DIAGNOSIS — E78.2 MIXED HYPERLIPIDEMIA: ICD-10-CM

## 2024-01-03 DIAGNOSIS — I10 ESSENTIAL HYPERTENSION: Primary | ICD-10-CM

## 2024-01-03 DIAGNOSIS — I48.19 ATRIAL FIBRILLATION, PERSISTENT (HCC): ICD-10-CM

## 2024-01-03 PROCEDURE — 93000 ELECTROCARDIOGRAM COMPLETE: CPT | Performed by: PHYSICIAN ASSISTANT

## 2024-01-03 PROCEDURE — 99214 OFFICE O/P EST MOD 30 MIN: CPT | Performed by: PHYSICIAN ASSISTANT

## 2024-01-03 RX ORDER — LOSARTAN POTASSIUM 50 MG/1
50 TABLET ORAL DAILY
Qty: 90 TABLET | Refills: 0 | Status: SHIPPED | OUTPATIENT
Start: 2024-01-03 | End: 2024-04-02

## 2024-01-03 NOTE — PROGRESS NOTES
Progress Note - Cardiology Office  [facility]    Adrien Kerr 73 y.o. male MRN: [id number]  : 1950  Encounter: [id number]      Assessment:     Persistent atrial fibrillation.  Hypertension.  Dyslipidemia.  Hemorrhagic CVA.      Discussion Summary and Plan:    Persistent atrial fibrillation.  - During 10/5/2023 cardiology office visit patient was noted to be in new onset atrial fibrillation with controlled ventricular rate, unknown duration.  Decision was made to pursue rate control strategy and anticoagulation as patient was asymptomatic.  - Rate control strategy.   - 24 EKG: Atrial fibrillation, ventricular rate 71 bpm.  - Patient remains asymptomatic from atrial fibrillation.  - Continue labetalol 200 mg twice daily.  - [id number] stroke risk score 5 points, moderate-high risk.  - Continue Eliquis 5 mg twice daily.  - 10/20/23 nuclear stress test: Negative study for evidence of major reversible perfusion defect to suggest ischemia. Normal gated EF 63%. SSS 2 SRS 2 SDS 1. Elevated baseline blood pressure 150/90.  - 10/20/23 TTE: LVEF 50-55%.  Unable to assess diastolic function due to atrial fibrillation.  Right ventricle systolic function is normal.  Left atrium severely dilated.  Right atrium severely dilated.  Mitral valve with mild regurgitation.  Tricuspid valve mild regurgitation.  Right ventricle systolic pressure is mildly elevated at 47 mmHg. Trace pulmonic valve regurgitation.   - Holter monitor ordered during last cardiology office visit on 10/5/2023.  Has not been completed, will reorder to assess HR.    Hypertension.  - BP during today's office visit, 130/76.   - Currently on amlodipine 5 mg daily and labetalol 200 mg twice daily. Also documented losartan 50 mg twice daily?.  On review of most recent cardiology note from 10/05/2023 patient should be on losartan 50 mg daily.  - Continue losartan 50 mg daily.   - 10/20/23 TTE: LVEF 50-55%.  Unable to assess  diastolic function due to atrial fibrillation.  Right ventricle systolic function is normal.  Left atrium severely dilated.  Right atrium severely dilated.  Mitral valve with mild regurgitation.  Tricuspid valve mild regurgitation.  Right ventricle systolic pressure is mildly elevated at 47 mmHg. Trace pulmonic valve regurgitation.     Dyslipidemia.  - Currently on Lipitor 10 mg daily.  - 9/23/23 lipid panel: Cholesterol 152, triglycerides 108, HDL 55, LDL 75.    Hemorrhagic CVA.  - Patient with history of subarachnoid hemorrhage in 2003.      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  Office number was provided #[phone].  Please call [phone] if any questions.  Counseling :  A description of the counseling.  Goals and Barriers.  Patient's ability to self care: Yes  Medication side effect reviewed with patient in detail and all their questions answered to their satisfaction.    HPI :     Adrien Kerr is a 73 y.o. male with PMHx of persistent atrial fibrillation (on Eliquis), HTN, dyslipidemia, hemorrhagic CVA (2003) who presents for routine outpatient cardiology follow-up.  Patient was last seen on 10/5/2023.     During 10/5/2023 cardiology office visit patient was noted to be in new onset atrial fibrillation with controlled ventricular rate, unknown duration.  Decision was made to pursue rate control strategy and anticoagulation as patient was asymptomatic.      Since last office visit patient reports he has been feeling well.  He offers no complaints.  He denies experiencing chest pain, palpitations, shortness of breath at rest or with exertion, anemia, lower extremity swelling, lightheadedness, dizziness, nausea, or vomiting.       Patient is accompanied by  his grandson who reports that the patient has been doing well.    Review of Systems   All other systems reviewed and are negative.      Historical Information   Past Medical History:   Diagnosis Date    Anxiety     Arthritis     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      Hyperlipidemia     Stroke (HCC) 2005    Systemic inflammatory response syndrome (HCC) 12/25/2020     Past Surgical History:   Procedure Laterality Date    HERNIA REPAIR      AL ARTHRP KNE CONDYLE&PLATU MEDIAL&LAT COMPARTMENTS Right 4/5/2021    Procedure: ARTHROPLASTY KNEE TOTAL;  Surgeon: Justin Lucero DO;  Location: [facility];  Service: Orthopedics    AL ARTHRP KNE CONDYLE&PLATU MEDIAL&LAT COMPARTMENTS Left 7/13/2021    Procedure: ARTHROPLASTY KNEE TOTAL;  Surgeon: Justin Lucero DO;  Location: [facility];  Service: Orthopedics     Social History     Substance and Sexual Activity   Alcohol Use Never     Social History     Substance and Sexual Activity   Drug Use Never     Social History     Tobacco Use   Smoking Status Never   Smokeless Tobacco Never     Family History:   Family History   Problem Relation Age of Onset    Stroke Mother     Stroke Father        Meds/Allergies     No Known Allergies    Current Outpatient Medications:     acetaminophen (TYLENOL) 325 mg tablet, Take 3 tablets (975 mg total) by mouth every 8 (eight) hours, Disp: , Rfl: 0    amLODIPine (NORVASC) 5 mg tablet, Take 1 tablet (5 mg total) by mouth daily, Disp: 90 tablet, Rfl: 0    ammonium lactate (LAC-HYDRIN) 12 % lotion, Apply topically 2 (two) times a day as needed for dry skin, Disp: 400 g, Rfl: 0    apixaban (Eliquis) 5 mg, Take 1 tablet (5 mg total) by mouth 2 (two) times a day, Disp: 180 tablet, Rfl: 3    atorvastatin (LIPITOR) 10 mg tablet, Take 1 tablet (10 mg total) by mouth daily with dinner, Disp: 90 tablet, Rfl: 0    escitalopram (LEXAPRO) 5 mg tablet, Take 1 tablet (5 mg total) by mouth daily at bedtime, Disp: 90 tablet, Rfl: 0    labetalol (NORMODYNE) 200 mg  "tablet, Take 1 tablet (200 mg total) by mouth every 12 (twelve) hours, Disp: 180 tablet, Rfl: 0    losartan (COZAAR) 50 mg tablet, Take 1 tablet (50 mg total) by mouth daily, Disp: 90 tablet, Rfl: 0    vitamin B-12 (CYANOCOBALAMIN) 50 MCG TABS, Take 1 tablet (50 mcg total) by mouth daily, Disp: 90 tablet, Rfl: 0    Ascorbic Acid (Vitamin C ER) 500 MG CPCR, Take 1 capsule (500 mg total) by mouth in the morning, Disp: 90 capsule, Rfl: 0    Ascorbic Acid (Vitamin C ER) 500 MG CPCR, , Disp: , Rfl:     Vitals: Blood pressure 130/76, pulse 71, height 6' 2\" (1.88 m), weight 83.5 kg (184 lb), SpO2 97%.    Body mass index is 23.62 kg/m².  Wt Readings from Last 3 Encounters:   24 83.5 kg (184 lb)   10/20/23 84.4 kg (186 lb)   10/20/23 84.4 kg (186 lb)     Vitals:    24 1534   Weight: 83.5 kg (184 lb)     BP Readings from Last 3 Encounters:   24 130/76   10/20/23 150/90   10/20/23 140/80       Physical Exam:  Physical Exam  Vitals reviewed.   Constitutional:       General: He is not in acute distress.  Cardiovascular:      Rate and Rhythm: Normal rate. Rhythm irregular.      Pulses: Normal pulses.      Heart sounds: Murmur heard.   Pulmonary:      Effort: Pulmonary effort is normal. No respiratory distress.      Breath sounds: Normal breath sounds.   Abdominal:      General: Abdomen is flat. There is no distension.      Palpations: Abdomen is soft.   Musculoskeletal:      Right lower leg: No edema.      Left lower leg: No edema.   Skin:     General: Skin is warm and dry.   Neurological:      Mental Status: He is alert and oriented to person, place, and time.     Diagnostic Studies Review Cardio:      EK/03/24 EKG: Atrial fibrillation, ventricular rate 71 bpm.    Cardiac testing:   Echo complete   Result date: 10/20/23    Left Ventricle Left ventricular cavity size is normal. Wall thickness is mildly increased. There is mild concentric hypertrophy. Systolic function is normal.  Estimated ejection " fraction is 50-55%. Although no diagnostic regional wall motion abnormality was identified, this possibility cannot be completely excluded on the basis of this study. Unable to assess diastolic function due to atrial fibrillation.   Right Ventricle Right ventricular cavity size is normal. Systolic function is normal. Wall thickness is normal.   Left Atrium The atrium is severely dilated (>48 mL/m2).   Right Atrium The atrium is severely dilated.   Aortic Valve The aortic valve is trileaflet. The leaflets are not thickened. The leaflets are not calcified. The leaflets exhibit normal mobility. There is no evidence of regurgitation. The aortic valve has no significant stenosis.   Mitral Valve Mitral valve structure is normal. There is mild regurgitation. There is no evidence of stenosis.   Tricuspid Valve Tricuspid valve structure is normal. There is mild regurgitation. There is no evidence of stenosis. The right ventricular systolic pressure is mildly elevated at 47 mmHg.   Pulmonic Valve Pulmonic valve structure is normal. There is trace regurgitation. There is no evidence of stenosis.   Ascending Aorta The aortic root is normal in size.   IVC/SVC The inferior vena cava is dilated. Respirophasic changes were blunted (less than 50% variation).   Pericardium There is possibly a trivial pericardial effusion posterior to the heart. There is no echocardiographic evidence of tamponade. The pericardium is normal in appearance.         Results for orders placed during the hospital encounter of 10/20/23    NM myocardial perfusion spect (rx stress and/or rest)    Interpretation Summary    Stress ECG: Arrhythmias during stress: atrial fibrillation. Arrhythmias during recovery: atrial fibrillation. The stress ECG is equivocal for ischemia after vasodilation and low level exercise, without reproduction of symptoms.    Stress ECG: Overall, the patient's exercise capacity was moderately impaired for their age. The patient after  "exercising for 3 min and had a maximal HR of 112 bpm (77 % of MPHR) and 1.6 METS. The patient experienced no angina during the test.    Perfusion: There is a left ventricular perfusion defect that is small in size present in the inferior location(s) which improved with prone imaging    Stress Combined Conclusion: Left ventricular perfusion is probably abnormal.    Stress Function: Left ventricular function post-stress is normal. Stress ejection fraction is 63 %.    Perfusion Defect Conclusion: The stress/rest perfusion ratio is 0.96 . There is no evidence of transient ischemic dilation (TID).    Negative study for evidence of major reversible perfusion defect to suggest ischemia. Normal gated EF 63%. SSS 2 SRS 2 SDS 1  Elevated baseline blood pressure 150/90.    No results found.    Imaging:  Chest X-Ray:       CT-scan of the chest:     No CTA results available for this patient.  Lab Review   Lab Results   Component Value Date    WBC 4.95 09/23/2023    HGB 13.5 09/23/2023    HCT 40.5 09/23/2023    MCV 91 09/23/2023    RDW 13.1 09/23/2023     09/23/2023     BMP:  Lab Results   Component Value Date    SODIUM 138 09/23/2023    K 4.5 09/23/2023     09/23/2023    CO2 30 09/23/2023    BUN 21 09/23/2023    CREATININE 1.05 09/23/2023    GLUC 109 (H) 09/02/2021    GLUF 85 09/23/2023    CALCIUM 9.2 09/23/2023    CORRECTEDCA 9.0 03/23/2021    EGFR 69 09/23/2023    MG 2.1 12/28/2020     Troponins:    LFT:  Lab Results   Component Value Date    AST 21 09/23/2023    ALT 16 09/23/2023    ALKPHOS 71 09/23/2023    TP 7.3 09/23/2023    ALB 4.0 09/23/2023      No components found for: \"TSH3\"  Lab Results   Component Value Date    JME0QMMGQDTK 5.157 (H) 09/23/2023     Lab Results   Component Value Date    HGBA1C 5.7 (H) 09/03/2022     Lipid Profile:   Lab Results   Component Value Date    CHOLESTEROL 152 09/23/2023    HDL 55 09/23/2023    LDLCALC 75 09/23/2023    TRIG 108 09/23/2023     Lab Results   Component Value Date " "   CHOLESTEROL 152 09/23/2023    CHOLESTEROL 110 09/03/2022     No results found for: \"CKTOTAL\", \"CKMB\", \"CKMBINDEX\", \"TROPONINI\"  No results found for: \"NTBNP\"   No results found for this or any previous visit (from the past 672 hour(s)).          Jessica Álvarez PA-C   "

## 2024-01-04 ENCOUNTER — TELEPHONE (OUTPATIENT)
Dept: CARDIOLOGY CLINIC | Facility: CLINIC | Age: 74
End: 2024-01-04

## 2024-01-22 DIAGNOSIS — I10 ESSENTIAL HYPERTENSION: ICD-10-CM

## 2024-01-22 RX ORDER — LOSARTAN POTASSIUM 50 MG/1
50 TABLET ORAL DAILY
Qty: 90 TABLET | Refills: 0 | Status: SHIPPED | OUTPATIENT
Start: 2024-01-22 | End: 2024-04-21

## 2024-01-26 ENCOUNTER — TELEPHONE (OUTPATIENT)
Dept: CARDIOLOGY CLINIC | Facility: CLINIC | Age: 74
End: 2024-01-26

## 2024-01-26 ENCOUNTER — CLINICAL SUPPORT (OUTPATIENT)
Dept: CARDIOLOGY CLINIC | Facility: CLINIC | Age: 74
End: 2024-01-26
Payer: COMMERCIAL

## 2024-01-26 DIAGNOSIS — I48.19 ATRIAL FIBRILLATION, PERSISTENT (HCC): ICD-10-CM

## 2024-01-26 PROCEDURE — 93248 EXT ECG>7D<15D REV&INTERPJ: CPT | Performed by: INTERNAL MEDICINE

## 2024-01-26 NOTE — TELEPHONE ENCOUNTER
Fany Mueller MA  1/26/2024  4:25 PM EST Back to Top      I attempted to reach patient, ended call.    Char Kerr MD  1/26/2024  4:06 PM EST       Atrial Fibrillation occurred continuously (100% burden), ranging from 48-  144 bpm (avg of 79 bpm). Isolated VEs were rare (<1.0%), VE Couplets  were rare (<1.0%), and no VE Triplets were present.     Patient's monitoring shows patient is in permanent atrial fibrillation with average heart rate around 79 bpm.  He should keep his appointment.  Continue  doing other test as we ordered.

## 2024-01-26 NOTE — TELEPHONE ENCOUNTER
----- Message from Char Kerr MD sent at 1/26/2024  4:06 PM EST -----  Atrial Fibrillation occurred continuously (100% burden), ranging from 48-  144 bpm (avg of 79 bpm). Isolated VEs were rare (<1.0%), VE Couplets  were rare (<1.0%), and no VE Triplets were present.    Patient's monitoring shows patient is in permanent atrial fibrillation with average heart rate around 79 bpm.  He should keep his appointment.  Continue  doing other test as we ordered.

## 2024-02-23 ENCOUNTER — TELEPHONE (OUTPATIENT)
Dept: FAMILY MEDICINE CLINIC | Facility: CLINIC | Age: 74
End: 2024-02-23

## 2024-02-23 DIAGNOSIS — E78.2 MIXED HYPERLIPIDEMIA: ICD-10-CM

## 2024-02-23 DIAGNOSIS — I10 ESSENTIAL HYPERTENSION: Primary | ICD-10-CM

## 2024-02-23 DIAGNOSIS — F32.4 MAJOR DEPRESSIVE DISORDER IN PARTIAL REMISSION, UNSPECIFIED WHETHER RECURRENT (HCC): ICD-10-CM

## 2024-02-23 DIAGNOSIS — Z86.73 HISTORY OF HEMORRHAGIC CEREBROVASCULAR ACCIDENT (CVA) WITHOUT RESIDUAL DEFICITS: ICD-10-CM

## 2024-02-23 DIAGNOSIS — D50.8 OTHER IRON DEFICIENCY ANEMIA: ICD-10-CM

## 2024-02-23 NOTE — TELEPHONE ENCOUNTER
Pt scheduled an appt with you in May. They would like to get blood work completed before the appt. Are you able to place routine lab work?

## 2024-04-02 DIAGNOSIS — I48.91 ATRIAL FIBRILLATION, UNSPECIFIED TYPE (HCC): ICD-10-CM

## 2024-04-02 DIAGNOSIS — G62.9 NEUROPATHY: ICD-10-CM

## 2024-04-02 DIAGNOSIS — I10 ESSENTIAL HYPERTENSION: ICD-10-CM

## 2024-04-02 DIAGNOSIS — G47.00 INSOMNIA, UNSPECIFIED TYPE: ICD-10-CM

## 2024-04-02 DIAGNOSIS — F32.4 MAJOR DEPRESSIVE DISORDER IN PARTIAL REMISSION, UNSPECIFIED WHETHER RECURRENT (HCC): ICD-10-CM

## 2024-04-02 DIAGNOSIS — Z86.73 HISTORY OF HEMORRHAGIC CEREBROVASCULAR ACCIDENT (CVA) WITHOUT RESIDUAL DEFICITS: ICD-10-CM

## 2024-04-02 PROBLEM — D64.9 ANEMIA: Status: RESOLVED | Noted: 2021-01-04 | Resolved: 2024-04-02

## 2024-04-02 PROBLEM — Z87.19 STATUS POST RIGHT INGUINAL HERNIA REPAIR: Status: RESOLVED | Noted: 2020-12-25 | Resolved: 2024-04-02

## 2024-04-02 PROBLEM — Z96.652 STATUS POST TOTAL LEFT KNEE REPLACEMENT USING CEMENT: Status: RESOLVED | Noted: 2021-07-13 | Resolved: 2024-04-02

## 2024-04-02 PROBLEM — Z98.890 STATUS POST RIGHT INGUINAL HERNIA REPAIR: Status: RESOLVED | Noted: 2020-12-25 | Resolved: 2024-04-02

## 2024-04-02 PROBLEM — R93.5 ABNORMAL CT OF THE ABDOMEN: Status: RESOLVED | Noted: 2021-01-04 | Resolved: 2024-04-02

## 2024-04-02 PROBLEM — Z96.651 STATUS POST TOTAL KNEE REPLACEMENT USING CEMENT, RIGHT: Status: RESOLVED | Noted: 2021-04-05 | Resolved: 2024-04-02

## 2024-04-02 RX ORDER — PYRIDOXINE HCL (VITAMIN B6) 50 MG
50 TABLET ORAL DAILY
Qty: 90 TABLET | Refills: 0 | Status: SHIPPED | OUTPATIENT
Start: 2024-04-02 | End: 2024-07-01

## 2024-04-02 RX ORDER — ATORVASTATIN CALCIUM 10 MG/1
10 TABLET, FILM COATED ORAL
Qty: 90 TABLET | Refills: 0 | Status: SHIPPED | OUTPATIENT
Start: 2024-04-02 | End: 2024-07-01

## 2024-04-02 RX ORDER — LANOLIN ALCOHOL/MO/W.PET/CERES
500 CREAM (GRAM) TOPICAL DAILY
Qty: 90 CAPSULE | Refills: 0 | Status: SHIPPED | OUTPATIENT
Start: 2024-04-02 | End: 2024-07-01

## 2024-04-02 RX ORDER — ESCITALOPRAM OXALATE 5 MG/1
5 TABLET ORAL
Qty: 90 TABLET | Refills: 0 | Status: SHIPPED | OUTPATIENT
Start: 2024-04-02 | End: 2024-07-01

## 2024-04-02 RX ORDER — AMLODIPINE BESYLATE 5 MG/1
5 TABLET ORAL DAILY
Qty: 90 TABLET | Refills: 0 | Status: SHIPPED | OUTPATIENT
Start: 2024-04-02 | End: 2024-07-01

## 2024-04-02 RX ORDER — LOSARTAN POTASSIUM 50 MG/1
50 TABLET ORAL DAILY
Qty: 90 TABLET | Refills: 0 | Status: SHIPPED | OUTPATIENT
Start: 2024-04-02 | End: 2024-07-01

## 2024-04-02 RX ORDER — LABETALOL 200 MG/1
200 TABLET, FILM COATED ORAL EVERY 12 HOURS
Qty: 180 TABLET | Refills: 0 | Status: SHIPPED | OUTPATIENT
Start: 2024-04-02 | End: 2024-07-01

## 2024-04-02 NOTE — TELEPHONE ENCOUNTER
Received Fax prescribing:  Apixaban 5mg  Losartan Potassium 50mg  Melatonin 5 mg (not on med list)

## 2024-04-02 NOTE — TELEPHONE ENCOUNTER
MARIANNE left on clinical line:    MARIANNE left on clinical line:    Hello. I actually called last week four prescriptions Adrien mario him saying SN four [id number] letter word. Pharmacies are most of pills need to be refilled and another person Mom MICHAEL pettit KURR [id number] She also mum needs all the prescription to be filled and third person is call minder Toni COLT [id number]. He needs his Plavix and all other pills to be refilled. I think I called last week, left a message for refill prescriptions, no one answered and I asked pharmacy to fax you guys and I'm calling again today. Please refill all the three persons prescription. Thank you so much. Call back is [phone]. Thank you.

## 2024-04-27 ENCOUNTER — APPOINTMENT (OUTPATIENT)
Dept: LAB | Facility: HOSPITAL | Age: 74
End: 2024-04-27
Payer: COMMERCIAL

## 2024-04-27 DIAGNOSIS — E78.2 MIXED HYPERLIPIDEMIA: ICD-10-CM

## 2024-04-27 DIAGNOSIS — D50.8 IRON DEFICIENCY ANEMIA SECONDARY TO INADEQUATE DIETARY IRON INTAKE: ICD-10-CM

## 2024-04-27 DIAGNOSIS — I10 ESSENTIAL HYPERTENSION, MALIGNANT: ICD-10-CM

## 2024-04-27 DIAGNOSIS — Z86.73 PERSONAL HISTORY OF TRANSIENT CEREBRAL ISCHEMIA: ICD-10-CM

## 2024-04-27 LAB
ALBUMIN SERPL BCP-MCNC: 4.1 G/DL (ref 3.5–5)
ALP SERPL-CCNC: 80 U/L (ref 34–104)
ALT SERPL W P-5'-P-CCNC: 28 U/L (ref 7–52)
ANION GAP SERPL CALCULATED.3IONS-SCNC: 6 MMOL/L (ref 4–13)
AST SERPL W P-5'-P-CCNC: 25 U/L (ref 13–39)
BASOPHILS # BLD AUTO: 0.07 THOUSANDS/ÂΜL (ref 0–0.1)
BASOPHILS NFR BLD AUTO: 2 % (ref 0–1)
BILIRUB SERPL-MCNC: 0.62 MG/DL (ref 0.2–1)
BUN SERPL-MCNC: 20 MG/DL (ref 5–25)
CALCIUM SERPL-MCNC: 8.9 MG/DL (ref 8.4–10.2)
CHLORIDE SERPL-SCNC: 105 MMOL/L (ref 96–108)
CHOLEST SERPL-MCNC: 115 MG/DL
CO2 SERPL-SCNC: 27 MMOL/L (ref 21–32)
CREAT SERPL-MCNC: 1.11 MG/DL (ref 0.6–1.3)
CRP SERPL HS-MCNC: 3.47 MG/L
EOSINOPHIL # BLD AUTO: 0.27 THOUSAND/ÂΜL (ref 0–0.61)
EOSINOPHIL NFR BLD AUTO: 6 % (ref 0–6)
ERYTHROCYTE [DISTWIDTH] IN BLOOD BY AUTOMATED COUNT: 12.6 % (ref 11.6–15.1)
GFR SERPL CREATININE-BSD FRML MDRD: 64 ML/MIN/1.73SQ M
GLUCOSE P FAST SERPL-MCNC: 90 MG/DL (ref 65–99)
HCT VFR BLD AUTO: 36.8 % (ref 36.5–49.3)
HDLC SERPL-MCNC: 61 MG/DL
HGB BLD-MCNC: 12.2 G/DL (ref 12–17)
IMM GRANULOCYTES # BLD AUTO: 0.01 THOUSAND/UL (ref 0–0.2)
IMM GRANULOCYTES NFR BLD AUTO: 0 % (ref 0–2)
LDLC SERPL CALC-MCNC: 44 MG/DL (ref 0–100)
LYMPHOCYTES # BLD AUTO: 0.88 THOUSANDS/ÂΜL (ref 0.6–4.47)
LYMPHOCYTES NFR BLD AUTO: 19 % (ref 14–44)
MCH RBC QN AUTO: 30 PG (ref 26.8–34.3)
MCHC RBC AUTO-ENTMCNC: 33.2 G/DL (ref 31.4–37.4)
MCV RBC AUTO: 90 FL (ref 82–98)
MONOCYTES # BLD AUTO: 0.59 THOUSAND/ÂΜL (ref 0.17–1.22)
MONOCYTES NFR BLD AUTO: 13 % (ref 4–12)
NEUTROPHILS # BLD AUTO: 2.86 THOUSANDS/ÂΜL (ref 1.85–7.62)
NEUTS SEG NFR BLD AUTO: 60 % (ref 43–75)
NONHDLC SERPL-MCNC: 54 MG/DL
NRBC BLD AUTO-RTO: 0 /100 WBCS
PLATELET # BLD AUTO: 207 THOUSANDS/UL (ref 149–390)
PMV BLD AUTO: 9.1 FL (ref 8.9–12.7)
POTASSIUM SERPL-SCNC: 3.6 MMOL/L (ref 3.5–5.3)
PROT SERPL-MCNC: 7.2 G/DL (ref 6.4–8.4)
RBC # BLD AUTO: 4.07 MILLION/UL (ref 3.88–5.62)
SODIUM SERPL-SCNC: 138 MMOL/L (ref 135–147)
TRIGL SERPL-MCNC: 48 MG/DL
WBC # BLD AUTO: 4.68 THOUSAND/UL (ref 4.31–10.16)

## 2024-04-27 PROCEDURE — 80061 LIPID PANEL: CPT

## 2024-04-27 PROCEDURE — 80053 COMPREHEN METABOLIC PANEL: CPT

## 2024-04-27 PROCEDURE — 36415 COLL VENOUS BLD VENIPUNCTURE: CPT

## 2024-04-27 PROCEDURE — 85025 COMPLETE CBC W/AUTO DIFF WBC: CPT

## 2024-04-27 PROCEDURE — 86141 C-REACTIVE PROTEIN HS: CPT

## 2024-05-02 ENCOUNTER — OFFICE VISIT (OUTPATIENT)
Age: 74
End: 2024-05-02

## 2024-05-02 VITALS
RESPIRATION RATE: 17 BRPM | HEIGHT: 74 IN | DIASTOLIC BLOOD PRESSURE: 81 MMHG | OXYGEN SATURATION: 97 % | HEART RATE: 75 BPM | WEIGHT: 187 LBS | BODY MASS INDEX: 24 KG/M2 | SYSTOLIC BLOOD PRESSURE: 123 MMHG

## 2024-05-02 DIAGNOSIS — G47.00 INSOMNIA, UNSPECIFIED TYPE: ICD-10-CM

## 2024-05-02 DIAGNOSIS — G62.9 NEUROPATHY: ICD-10-CM

## 2024-05-02 DIAGNOSIS — F41.9 ANXIETY: ICD-10-CM

## 2024-05-02 DIAGNOSIS — I48.91 ATRIAL FIBRILLATION, UNSPECIFIED TYPE (HCC): ICD-10-CM

## 2024-05-02 DIAGNOSIS — I48.11 LONGSTANDING PERSISTENT ATRIAL FIBRILLATION (HCC): ICD-10-CM

## 2024-05-02 DIAGNOSIS — I10 ESSENTIAL HYPERTENSION: Primary | ICD-10-CM

## 2024-05-02 DIAGNOSIS — Z87.898 HISTORY OF NOCTURIA: ICD-10-CM

## 2024-05-02 DIAGNOSIS — E78.2 MIXED HYPERLIPIDEMIA: ICD-10-CM

## 2024-05-02 DIAGNOSIS — R73.03 PREDIABETES: ICD-10-CM

## 2024-05-02 DIAGNOSIS — F32.4 MAJOR DEPRESSIVE DISORDER IN PARTIAL REMISSION, UNSPECIFIED WHETHER RECURRENT (HCC): ICD-10-CM

## 2024-05-02 DIAGNOSIS — Z86.73 HISTORY OF HEMORRHAGIC CEREBROVASCULAR ACCIDENT (CVA) WITHOUT RESIDUAL DEFICITS: ICD-10-CM

## 2024-05-02 LAB — SL AMB POCT HEMOGLOBIN AIC: 5.2 (ref ?–6.5)

## 2024-05-02 PROCEDURE — 83036 HEMOGLOBIN GLYCOSYLATED A1C: CPT | Performed by: FAMILY MEDICINE

## 2024-05-02 PROCEDURE — 99214 OFFICE O/P EST MOD 30 MIN: CPT | Performed by: FAMILY MEDICINE

## 2024-05-02 RX ORDER — LOSARTAN POTASSIUM 50 MG/1
50 TABLET ORAL DAILY
Qty: 90 TABLET | Refills: 3 | Status: SHIPPED | OUTPATIENT
Start: 2024-05-02

## 2024-05-02 RX ORDER — LANOLIN ALCOHOL/MO/W.PET/CERES
500 CREAM (GRAM) TOPICAL DAILY
COMMUNITY
Start: 2024-04-02 | End: 2024-05-02 | Stop reason: SDUPTHER

## 2024-05-02 RX ORDER — PYRIDOXINE HCL (VITAMIN B6) 50 MG
50 TABLET ORAL DAILY
Qty: 90 TABLET | Refills: 3 | Status: SHIPPED | OUTPATIENT
Start: 2024-05-02

## 2024-05-02 RX ORDER — ATORVASTATIN CALCIUM 10 MG/1
10 TABLET, FILM COATED ORAL
Qty: 90 TABLET | Refills: 3 | Status: SHIPPED | OUTPATIENT
Start: 2024-05-02

## 2024-05-02 RX ORDER — LABETALOL 200 MG/1
200 TABLET, FILM COATED ORAL EVERY 12 HOURS
Qty: 180 TABLET | Refills: 3 | Status: SHIPPED | OUTPATIENT
Start: 2024-05-02

## 2024-05-02 RX ORDER — AMLODIPINE BESYLATE 5 MG/1
5 TABLET ORAL DAILY
Qty: 90 TABLET | Refills: 3 | Status: SHIPPED | OUTPATIENT
Start: 2024-05-02

## 2024-05-02 RX ORDER — LANOLIN ALCOHOL/MO/W.PET/CERES
500 CREAM (GRAM) TOPICAL DAILY
Qty: 90 CAPSULE | Refills: 3 | Status: SHIPPED | OUTPATIENT
Start: 2024-05-02

## 2024-05-02 RX ORDER — ESCITALOPRAM OXALATE 5 MG/1
5 TABLET ORAL
Qty: 90 TABLET | Refills: 1 | Status: SHIPPED | OUTPATIENT
Start: 2024-05-02

## 2024-05-02 NOTE — PROGRESS NOTES
1. Essential hypertension  Good blood pressure control.  He knows to avoid excess salt.  Given resources for M IN D diet.  With mildly elevated CRP emphasize ground flaxseed and other anti-inflammatory herbs such as turmeric.    2. Anxiety  This is presently well-controlled and he can continue his Lexapro 5 mg daily and his daily yoga and walk    3. History of hemorrhagic cerebrovascular accident (CVA) without residual deficits  No new neurovascular complaints or concerns    4. Mixed hyperlipidemia  Last LDL was very well-controlled on his present statin and keep dose the same    5. History of nocturia  Has nocturia several times a night and can see a urologist in [place] since will be leaving soon for thorough workup of this.    6. Prediabetes  Given that A1c a couple years ago of 5.7 we will recheck A1c now.  Work on healthy lifestyle including mind diet and anti-inflammatory herbs and spices.  Daily walk.  - POCT hemoglobin A1c    7. A fib: Good rate control.  Given markedly dilated atria it is unlikely that this would be convertible to sinus rhythm so this strategy is good rate control which she has now and he is to continue his Eliquis twice a day for stroke prevention.                Reviewed and reinforced basics of healthy lifestyle  Risks and benefits of therapeutic plan discussed, answered all patient questions and concerns and patient expressed understanding and agreement of therapeutic plan.    No follow-ups on file.    Plan next visit: f/u in [place]. [place] to [place] next April when can also f/u here.     Chief complaint and HPI: Adrien Kerr is a 74 y.o. male who presents for follow up of multiple chronic medical problems, as listed below in problem list. All problems are relatively stable except for the following issues:   Dr Kerr ordered cardiol studies, some silent a fib, other studies ok.     No acute concern    Dad of Diego. They will be returning to [facility] in a month    Vegetarian. Low sodium.          Chief Complaint   Patient presents with    Follow-up       Review of systems:  No fever/chills/sweats/unexplained weight loss  No chest pain/shortness of breath  No change in digestion or bowel movements  No change in urination  No new musculoskeletal or neurological symptoms      Chart reviewed for relevant medical, surgical and psychosocial history, medications and allergies, labs and studies, and relevant ones discussed with patient as needed    WBC   Date/Time Value Ref Range Status   04/27/2024 07:22 AM 4.68 4.31 - 10.16 Thousand/uL Final     Hemoglobin   Date/Time Value Ref Range Status   04/27/2024 07:22 AM 12.2 12.0 - 17.0 g/dL Final     Hematocrit   Date/Time Value Ref Range Status   04/27/2024 07:22 AM 36.8 36.5 - 49.3 % Final     MCV   Date/Time Value Ref Range Status   04/27/2024 07:22 AM 90 82 - 98 fL Final     Platelets   Date/Time Value Ref Range Status   04/27/2024 07:22  149 - 390 Thousands/uL Final     Sodium   Date/Time Value Ref Range Status   04/27/2024 07:22  135 - 147 mmol/L Final   09/02/2021 08:48  134 - 144 mmol/L Final     Potassium   Date/Time Value Ref Range Status   04/27/2024 07:22 AM 3.6 3.5 - 5.3 mmol/L Final   09/02/2021 08:48 AM 4.4 3.5 - 5.2 mmol/L Final     Chloride   Date/Time Value Ref Range Status   04/27/2024 07:22  96 - 108 mmol/L Final   09/02/2021 08:48  96 - 106 mmol/L Final     CO2   Date/Time Value Ref Range Status   04/27/2024 07:22 AM 27 21 - 32 mmol/L Final   09/02/2021 08:48 AM 26 20 - 29 mmol/L Final     ANION GAP   Date/Time Value Ref Range Status   04/27/2024 07:22 AM 6 4 - 13 mmol/L Final     BUN   Date/Time Value Ref Range Status   04/27/2024 07:22 AM 20 5 - 25 mg/dL Final   09/02/2021 08:48 AM 16 8 - 27 mg/dL Final     Creatinine   Date/Time Value Ref Range Status   04/27/2024 07:22 AM 1.11 0.60 - 1.30 mg/dL Final     Comment:     Standardized to IDMS reference method     Glucose, Random   Date/Time Value Ref Range Status    09/02/2021 08:48  (H) 65 - 99 mg/dL Final     Calcium   Date/Time Value Ref Range Status   04/27/2024 07:22 AM 8.9 8.4 - 10.2 mg/dL Final     AST   Date/Time Value Ref Range Status   04/27/2024 07:22 AM 25 13 - 39 U/L Final   09/02/2021 08:48 AM 18 0 - 40 IU/L Final     ALT   Date/Time Value Ref Range Status   04/27/2024 07:22 AM 28 7 - 52 U/L Final     Comment:     Specimen collection should occur prior to Sulfasalazine administration due to the potential for falsely depressed results.    09/02/2021 08:48 AM 11 0 - 44 IU/L Final     Alkaline Phosphatase   Date/Time Value Ref Range Status   04/27/2024 07:22 AM 80 34 - 104 U/L Final     Total Protein   Date/Time Value Ref Range Status   04/27/2024 07:22 AM 7.2 6.4 - 8.4 g/dL Final     Protein, Total   Date/Time Value Ref Range Status   09/02/2021 08:48 AM 7.4 6.0 - 8.5 g/dL Final     Albumin   Date/Time Value Ref Range Status   04/27/2024 07:22 AM 4.1 3.5 - 5.0 g/dL Final     Total Bilirubin   Date/Time Value Ref Range Status   04/27/2024 07:22 AM 0.62 0.20 - 1.00 mg/dL Final     Comment:     Use of this assay is not recommended for patients undergoing treatment with eltrombopag due to the potential for falsely elevated results.  N-acetyl-p-benzoquinone imine (metabolite of Acetaminophen) will generate erroneously low results in samples for patients that have taken an overdose of Acetaminophen.     TOTAL BILIRUBIN   Date/Time Value Ref Range Status   09/02/2021 08:48 AM 0.5 0.0 - 1.2 mg/dL Final     eGFR   Date/Time Value Ref Range Status   04/27/2024 07:22 AM 64 ml/min/1.73sq m Final       Hemoglobin A1C   Date/Time Value Ref Range Status   09/03/2022 09:48 AM 5.7 (H) Normal 3.8-5.6%; PreDiabetic 5.7-6.4%; Diabetic >=6.5%; Glycemic control for adults with diabetes <7.0% % Final     Cholesterol   Date/Time Value Ref Range Status   04/27/2024 07:22  See Comment mg/dL Final     Comment:     Cholesterol:         Pediatric <18 Years        Desirable           <170 mg/dL      Borderline High    170-199 mg/dL      High               >=200 mg/dL        Adult >=18 Years            Desirable        <200 mg/dL      Borderline High  200-239 mg/dL      High             >239 mg/dL       Cholesterol, Total   Date/Time Value Ref Range Status   09/02/2021 08:48  100 - 199 mg/dL Final     LDL Calculated   Date/Time Value Ref Range Status   04/27/2024 07:22 AM 44 0 - 100 mg/dL Final     Comment:     LDL Cholesterol:     Optimal           <100 mg/dl     Near Optimal      100-129 mg/dl     Above Optimal       Borderline High 130-159 mg/dl       High            160-189 mg/dl       Very High       >189 mg/dl         This screening LDL is a calculated result.   It does not have the accuracy of the Direct Measured LDL in the monitoring of patients with hyperlipidemia and/or statin therapy.   Direct Measure LDL (WNW594) must be ordered separately in these patients.   09/02/2021 08:48 AM 47 0 - 99 mg/dL Final     HDL   Date/Time Value Ref Range Status   09/02/2021 08:48 AM 59 >39 mg/dL Final     HDL, Direct   Date/Time Value Ref Range Status   04/27/2024 07:22 AM 61 >=40 mg/dL Final     Triglycerides   Date/Time Value Ref Range Status   04/27/2024 07:22 AM 48 See Comment mg/dL Final     Comment:     Triglyceride:     0-9Y            <75mg/dL     10Y-17Y         <90 mg/dL       >=18Y     Normal          <150 mg/dL     Borderline High 150-199 mg/dL     High            200-499 mg/dL        Very High       >499 mg/dL    Specimen collection should occur prior to Metamizole administration due to the potential for falsely depressed results.   09/02/2021 08:48 AM 46 0 - 149 mg/dL Final     TSH   Date/Time Value Ref Range Status   01/19/2021 09:54 AM 3.740 0.450 - 4.500 uIU/mL Final     Magnesium   Date/Time Value Ref Range Status   12/28/2020 05:50 AM 2.1 1.6 - 2.6 mg/dL Final       Patient Active Problem List   Diagnosis    Hypertensive urgency    History of hemorrhagic cerebrovascular  "accident (CVA) without residual deficits    History of nocturia    Essential hypertension    Anxiety    Hyperlipidemia             EXAM:  The patient appears well, in no apparent distress.  Alert and oriented times three, pleasant and cooperative. Vital signs are as noted by the nurse. /81 (BP Location: Right arm, Patient Position: Sitting)   Pulse 75   Resp 17   Ht 6' 2\" (1.88 m)   Wt 84.8 kg (187 lb)   SpO2 97%   BMI 24.01 kg/m²     Head: normocephalic, atraumatic  Eyes: well perfused conjunctiva, not clinically pale, not jaundiced  Ears: external ear: no gross lesions  Nose: no rhinorrhea  Neck: supple, trachea in the midline and no concerning cervical lymphadenopathy  Lungs: No respiratory labor. Clear to auscultation  Heart: Irregular rhythm but good rate control, no murmurs, gallops or rubs  Abdomen: Benign: soft, non-tender, non-distended.  No guarding or rebound. No masses or organomegaly. Normal bowel sounds,   Skin: No pallor. No rashes noted  Extremities: Moves all extremities normally. 1+ pedal edema  Neuro: Grossly normal      "

## 2024-05-02 NOTE — PATIENT INSTRUCTIONS
The MIND diet is a variation and combination of a healthy Mediterranean and DASH diet with scientific evidence to support neurological and mental health. Thus it is an excellent healthy eating pattern for many people in general, as well as with neurological disorders such as peripheral neuropathy, nerve injuries such as Bell's Palsy,  cerebrovascular accidents (strokes), cognitive impairment, early dementia. It can also be helpful in mental health issues such as depression, anxiety, neurodevelopmental disorders such as ADHD, Autism spectrum.     Your doctor and/or nutritionist can individualize the  MIND diet to meet your specific clinical situation.     Note that if your are diabetic and you are on blood sugar lowering medications such as insulin or sulfonylureas (like glipizide, glimepiride, etc), this diet is so effective at improving and even reversing diabetes that your need for medication may go way down. To avoid very low blood sugar (hypoglycemia),  be sure to monitor your blood sugar very closely and adjust down your medications if needed under the guidance of your physician.         [email or web address]%[email or web address]%[email or web address].[email or web address]-content%[email or web address]%[email or web address]    Here are the key aspects of the MIND diet:    Eat whole, unprocessed food, mainly plants. 'Eat the rainbow' - eat different color veggies to get the full range of their nutrients. Vegetables should be about 1/2 of the plate, 1/4 of plate a (mainly plant based) protein source such as tofu), 1/4 of plate healthy starch or carb such as a whole grain or root vegetable.     Eat less animal products, and choose healthy sources, such as healthy fish (e.g. sardines, mackerel, herring, flounder, high quality salmon).  No more than the size of a pack of cards of animal products per day. Avoid all industrially produced (feedlot) red meat and poultry.     Eat plenty of  these key foods that support brain, nerve and mental health:  Avocados  Beans  Blueberries and other berries  Broccoli, Kale and other leafy greens are key: have a minimum of 1 serving a day in addition to other veggies  Extra-virgin olive oil  Flax seed (grind just prior to eating and toss on breakfast cereal, in salads). Don't heat it because the omega-3 fatty acids are very delicate.  Herbal teas: hibiscus, lemon balm, mint, etc.  Fresh herbs & spices like cilantro, dill, rosemary, thyme, oregano, basil, mint, parsley; cinnamon, oregano, marjoram, allspice, saffron and others. Turmeric is especially anti-inflammatory - adding a small amount of black pepper will help its absorption. Many herbs and spices are rich in anti-oxidants  Nuts & seeds, e.g. almonds, walnuts, kaila seeds, pumpkin, sunflower seeds. A handful a day will give you essential fatty acids and minerals like magnesium.  Whole grains: oats, buckwheat, millet, teff, sorghum, amaranth. Quinoa is the only grain that is a complete protein source and therefore is a great staple grain. Whole wheat in moderation is ok. AVOID all white flour products, as they are often largely empty calories deficient in micronutrients and are high in glycemic index that induce inflammation and over-eating.  Sweet potatoes (yams) are preferred over white potatoes.      AVOID or at least minimize all these:  Processed foods: chips, cookies, frozen dinners, white bread, bagels, pastries, sweets and similar bakery products   Fast food, fried foods. Avoid all fast food restaurants like [employer], [employer], [employer], [employer], most Chinese take-out and similar cheap unhealthy food establishments  Processed and industrially produced meats: thurston, salami, pastrami, hot dogs, luncheon meats are filled with saturated fat and sodium that induce nerve and brain inflammation and damage. Occasional free range poultry or grass fed bison or wild game meat like venison (e.g. once or  twice a week, 6 oz. -  the size of a pack of cards) is probably ok. Do not eat regular chicken, which is the main source of cholesterol in the standard American diet and a major contributor to obesity and nerve/brain inflammation.  Butter or margarine. These are high in saturated or dangerous trans fats  Cheese is high in saturated fat. Use not at all or minimally, such as a little sprinkle of a highly flavored cheese on food. Don't eat any food in which cheese is a main ingredient.    Sugary drinks such as soda. Also avoid artificially sweetened sodas, which confuse our energy balancing mechanism and disturb our gut microbiome  Excess alcohol. Don't drink at all if there is a contraindication to drinking such as alcohol use disorder, liver disease, etc., Men should never exceed two drinks per day, women no more than 1 drink per day. A drink = one small glass of wine, one 12 oz. Beer or one shot of hard liquor.    Here are some resources to learn more about the MIND diet and improving brain and nerve health:  The Mind diet cookbook 2021 by Ruthie Crespo. Brief intro then a year's work of nice recipes   The Alzheimer's Solution by Keisha Cuellar MD. Copyright 2017. Reviews healthy lifestyle as relates to brain health. Can use to help prevent/support healing of many neurological and mental health disorders, not just dementia. Has nice recipes in back of book.  How not to Die by Donald Rivera MD Copyright 2015. Evidence based nutrition to help prevent and support healing of many diseases. Has a  Cookbook. Useful web site: NutritionFacts.org      Smoothie for brain & nerve health (could have one a day):  Place in :   About 1 cup or more of water, milk, almond or soy milk, adjust for proper consistency  1 cup fresh kale or spinach or other dark leafy green  1 cup berries such as blueberries, blackberries or raspberries  1 very ripe banana or other fruit like scarlet, or other fruit of your  choice  1 - 2 tablespoons of freshly ground flaxseed  1/4 cup nuts such as walnuts, almonds, hazelnuts, etc.     Can also add per your preference:  Freshly ground Americo or other seeds  Various spices: a dash of nutmeg, 1/2 tsp or so of turmeric, cinnamon. cardamom, coriander, etc.   Flavors such as vanilla, cocoa powder (with a little sugar, like 1 teaspoon for palatability if needed)    If you have diabetes, add 1/2 tsp of cinnamon and sip slowly over 1 hour to avoid a sugar rush.     Have fun experimenting and adjusting to your taste!    The MIND diet should be part of a comprehensive program to help support healing from your medical condition. Be sure this is under the guidance of your health care provider.

## 2024-05-29 DIAGNOSIS — F32.4 MAJOR DEPRESSIVE DISORDER IN PARTIAL REMISSION, UNSPECIFIED WHETHER RECURRENT (HCC): ICD-10-CM

## 2024-05-29 RX ORDER — ESCITALOPRAM OXALATE 5 MG/1
5 TABLET ORAL
Qty: 90 TABLET | Refills: 1 | Status: SHIPPED | OUTPATIENT
Start: 2024-05-29

## 2025-01-28 NOTE — TELEPHONE ENCOUNTER
Dr Rudy Beauchamp did call daughter and address all questions/concerns 
Patient sees Dr Dashawn Montemayor  Patients daughter was unable to attend the appointment today and would like to know if Dr Dashawn Montemayor can please call her after the appointment        CB: [phone]
Continue Regimen: doxycycline hyclate 100 mg capsule \\n\\ntretinoin 0.05 % topical cream
Detail Level: Zone
Render In Strict Bullet Format?: No

## 2025-03-11 ENCOUNTER — TELEPHONE (OUTPATIENT)
Age: 75
End: 2025-03-11

## 2025-03-11 DIAGNOSIS — R73.03 PREDIABETES: Primary | ICD-10-CM

## 2025-03-11 DIAGNOSIS — E78.2 MIXED HYPERLIPIDEMIA: ICD-10-CM

## 2025-03-11 DIAGNOSIS — I48.91 ATRIAL FIBRILLATION, UNSPECIFIED TYPE (HCC): ICD-10-CM

## 2025-03-11 NOTE — TELEPHONE ENCOUNTER
PT is requesting routine lab orders to be placed so they can be done prior to AWV. I adv I will call when completed.   [phone]  Thank you

## 2025-05-09 ENCOUNTER — TELEPHONE (OUTPATIENT)
Age: 75
End: 2025-05-09

## 2025-05-13 ENCOUNTER — APPOINTMENT (OUTPATIENT)
Dept: LAB | Facility: HOSPITAL | Age: 75
End: 2025-05-13
Payer: COMMERCIAL

## 2025-05-13 DIAGNOSIS — E78.2 MIXED HYPERLIPIDEMIA: ICD-10-CM

## 2025-05-13 DIAGNOSIS — I48.91 ATRIAL FIBRILLATION, UNSPECIFIED TYPE (HCC): ICD-10-CM

## 2025-05-13 DIAGNOSIS — R73.03 PREDIABETES: ICD-10-CM

## 2025-05-13 LAB
ALBUMIN SERPL BCG-MCNC: 4 G/DL (ref 3.5–5)
ALP SERPL-CCNC: 65 U/L (ref 34–104)
ALT SERPL W P-5'-P-CCNC: 15 U/L (ref 7–52)
ANION GAP SERPL CALCULATED.3IONS-SCNC: 9 MMOL/L (ref 4–13)
AST SERPL W P-5'-P-CCNC: 25 U/L (ref 13–39)
BASOPHILS # BLD AUTO: 0.07 THOUSANDS/ÂΜL (ref 0–0.1)
BASOPHILS NFR BLD AUTO: 2 % (ref 0–1)
BILIRUB SERPL-MCNC: 0.78 MG/DL (ref 0.2–1)
BUN SERPL-MCNC: 19 MG/DL (ref 5–25)
CALCIUM SERPL-MCNC: 9.2 MG/DL (ref 8.4–10.2)
CHLORIDE SERPL-SCNC: 102 MMOL/L (ref 96–108)
CHOLEST SERPL-MCNC: 128 MG/DL (ref ?–200)
CO2 SERPL-SCNC: 26 MMOL/L (ref 21–32)
CREAT SERPL-MCNC: 1.2 MG/DL (ref 0.6–1.3)
EOSINOPHIL # BLD AUTO: 0.14 THOUSAND/ÂΜL (ref 0–0.61)
EOSINOPHIL NFR BLD AUTO: 3 % (ref 0–6)
ERYTHROCYTE [DISTWIDTH] IN BLOOD BY AUTOMATED COUNT: 13.9 % (ref 11.6–15.1)
EST. AVERAGE GLUCOSE BLD GHB EST-MCNC: 120 MG/DL
GFR SERPL CREATININE-BSD FRML MDRD: 57 ML/MIN/1.73SQ M
GLUCOSE P FAST SERPL-MCNC: 89 MG/DL (ref 65–99)
HBA1C MFR BLD: 5.8 %
HCT VFR BLD AUTO: 39.4 % (ref 36.5–49.3)
HDLC SERPL-MCNC: 56 MG/DL
HGB BLD-MCNC: 12.7 G/DL (ref 12–17)
IMM GRANULOCYTES # BLD AUTO: 0.01 THOUSAND/UL (ref 0–0.2)
IMM GRANULOCYTES NFR BLD AUTO: 0 % (ref 0–2)
LDLC SERPL CALC-MCNC: 58 MG/DL (ref 0–100)
LYMPHOCYTES # BLD AUTO: 1.07 THOUSANDS/ÂΜL (ref 0.6–4.47)
LYMPHOCYTES NFR BLD AUTO: 22 % (ref 14–44)
MCH RBC QN AUTO: 29.1 PG (ref 26.8–34.3)
MCHC RBC AUTO-ENTMCNC: 32.2 G/DL (ref 31.4–37.4)
MCV RBC AUTO: 90 FL (ref 82–98)
MONOCYTES # BLD AUTO: 0.5 THOUSAND/ÂΜL (ref 0.17–1.22)
MONOCYTES NFR BLD AUTO: 10 % (ref 4–12)
NEUTROPHILS # BLD AUTO: 3.01 THOUSANDS/ÂΜL (ref 1.85–7.62)
NEUTS SEG NFR BLD AUTO: 63 % (ref 43–75)
NRBC BLD AUTO-RTO: 0 /100 WBCS
PLATELET # BLD AUTO: 201 THOUSANDS/UL (ref 149–390)
PMV BLD AUTO: 10.5 FL (ref 8.9–12.7)
POTASSIUM SERPL-SCNC: 3.8 MMOL/L (ref 3.5–5.3)
PROT SERPL-MCNC: 7.2 G/DL (ref 6.4–8.4)
RBC # BLD AUTO: 4.37 MILLION/UL (ref 3.88–5.62)
SODIUM SERPL-SCNC: 137 MMOL/L (ref 135–147)
TRIGL SERPL-MCNC: 72 MG/DL (ref ?–150)
TSH SERPL DL<=0.05 MIU/L-ACNC: 3.7 UIU/ML (ref 0.45–4.5)
WBC # BLD AUTO: 4.8 THOUSAND/UL (ref 4.31–10.16)

## 2025-05-13 PROCEDURE — 84443 ASSAY THYROID STIM HORMONE: CPT

## 2025-05-13 PROCEDURE — 80061 LIPID PANEL: CPT

## 2025-05-13 PROCEDURE — 80053 COMPREHEN METABOLIC PANEL: CPT

## 2025-05-13 PROCEDURE — 83036 HEMOGLOBIN GLYCOSYLATED A1C: CPT

## 2025-05-13 PROCEDURE — 36415 COLL VENOUS BLD VENIPUNCTURE: CPT

## 2025-05-13 PROCEDURE — 85025 COMPLETE CBC W/AUTO DIFF WBC: CPT

## 2025-05-15 ENCOUNTER — RESULTS FOLLOW-UP (OUTPATIENT)
Age: 75
End: 2025-05-15

## 2025-05-22 ENCOUNTER — OFFICE VISIT (OUTPATIENT)
Age: 75
End: 2025-05-22

## 2025-05-22 VITALS
SYSTOLIC BLOOD PRESSURE: 147 MMHG | DIASTOLIC BLOOD PRESSURE: 89 MMHG | WEIGHT: 190 LBS | BODY MASS INDEX: 24.38 KG/M2 | OXYGEN SATURATION: 99 % | HEART RATE: 85 BPM | HEIGHT: 74 IN | RESPIRATION RATE: 16 BRPM

## 2025-05-22 DIAGNOSIS — F32.4 MAJOR DEPRESSIVE DISORDER IN PARTIAL REMISSION, UNSPECIFIED WHETHER RECURRENT (HCC): ICD-10-CM

## 2025-05-22 DIAGNOSIS — Z86.73 HISTORY OF HEMORRHAGIC CEREBROVASCULAR ACCIDENT (CVA) WITHOUT RESIDUAL DEFICITS: ICD-10-CM

## 2025-05-22 DIAGNOSIS — I48.91 ATRIAL FIBRILLATION, UNSPECIFIED TYPE (HCC): ICD-10-CM

## 2025-05-22 DIAGNOSIS — G62.9 NEUROPATHY: ICD-10-CM

## 2025-05-22 DIAGNOSIS — I10 ESSENTIAL HYPERTENSION: ICD-10-CM

## 2025-05-22 DIAGNOSIS — Z00.00 ANNUAL PHYSICAL EXAM: Primary | ICD-10-CM

## 2025-05-22 DIAGNOSIS — Z96.652 STATUS POST TOTAL LEFT KNEE REPLACEMENT USING CEMENT: ICD-10-CM

## 2025-05-22 DIAGNOSIS — Z23 ENCOUNTER FOR IMMUNIZATION: ICD-10-CM

## 2025-05-22 DIAGNOSIS — Z23 NEED FOR COVID-19 VACCINE: ICD-10-CM

## 2025-05-22 DIAGNOSIS — R73.03 PREDIABETES: ICD-10-CM

## 2025-05-22 RX ORDER — AMLODIPINE BESYLATE 5 MG/1
5 TABLET ORAL DAILY
Qty: 90 TABLET | Refills: 3 | Status: SHIPPED | OUTPATIENT
Start: 2025-05-22

## 2025-05-22 RX ORDER — PYRIDOXINE HCL (VITAMIN B6) 50 MG
50 TABLET ORAL DAILY
Qty: 90 TABLET | Refills: 3 | Status: SHIPPED | OUTPATIENT
Start: 2025-05-22

## 2025-05-22 RX ORDER — ACETAMINOPHEN 325 MG/1
325 TABLET ORAL EVERY 4 HOURS PRN
Qty: 90 TABLET | Refills: 11 | Status: SHIPPED | OUTPATIENT
Start: 2025-05-22

## 2025-05-22 RX ORDER — LABETALOL 200 MG/1
200 TABLET, FILM COATED ORAL EVERY 12 HOURS
Qty: 180 TABLET | Refills: 3 | Status: SHIPPED | OUTPATIENT
Start: 2025-05-22

## 2025-05-22 RX ORDER — LOSARTAN POTASSIUM 50 MG/1
50 TABLET ORAL DAILY
Qty: 90 TABLET | Refills: 3 | Status: SHIPPED | OUTPATIENT
Start: 2025-05-22

## 2025-05-22 RX ORDER — ESCITALOPRAM OXALATE 5 MG/1
5 TABLET ORAL
Qty: 90 TABLET | Refills: 1 | Status: SHIPPED | OUTPATIENT
Start: 2025-05-22

## 2025-05-22 RX ORDER — ATORVASTATIN CALCIUM 10 MG/1
10 TABLET, FILM COATED ORAL
Qty: 90 TABLET | Refills: 3 | Status: SHIPPED | OUTPATIENT
Start: 2025-05-22

## 2025-05-22 RX ORDER — LANOLIN ALCOHOL/MO/W.PET/CERES
500 CREAM (GRAM) TOPICAL DAILY
Qty: 90 CAPSULE | Refills: 3 | Status: SHIPPED | OUTPATIENT
Start: 2025-05-22

## 2025-05-22 NOTE — ASSESSMENT & PLAN NOTE
Orders:    atorvastatin (LIPITOR) 10 mg tablet; Take 1 tablet (10 mg total) by mouth daily with dinner

## 2025-05-22 NOTE — PATIENT INSTRUCTIONS
"Patient Education     Routine physical for adults   The Basics   Written by the doctors and editors at [facility]   What is a physical? -- A physical is a routine visit, or \"check-up,\" with your doctor. You might also hear it called a \"wellness visit\" or \"preventive visit.\"  During each visit, the doctor will:   Ask about your physical and mental health   Ask about your habits, behaviors, and lifestyle   Do an exam   Give you vaccines if needed   Talk to you about any medicines you take   Give advice about your health   Answer your questions  Getting regular check-ups is an important part of taking care of your health. It can help your doctor find and treat any problems you have. But it's also important for preventing health problems.  A routine physical is different from a \"sick visit.\" A sick visit is when you see a doctor because of a health concern or problem. Since physicals are scheduled ahead of time, you can think about what you want to ask the doctor.  How often should I get a physical? -- It depends on your age and health. In general, for people age 21 years and older:   If you are younger than 50 years, you might be able to get a physical every 3 years.   If you are 50 years or older, your doctor might recommend a physical every year.  If you have an ongoing health condition, like diabetes or high blood pressure, your doctor will probably want to see you more often.  What happens during a physical? -- In general, each visit will include:   Physical exam - The doctor or nurse will check your height, weight, heart rate, and blood pressure. They will also look at your eyes and ears. They will ask about how you are feeling and whether you have any symptoms that bother you.   Medicines - It's a good idea to bring a list of all the medicines you take to each doctor visit. Your doctor will talk to you about your medicines and answer any questions. Tell them if you are having any side effects that bother you. You " "should also tell them if you are having trouble paying for any of your medicines.   Habits and behaviors - This includes:   Your diet   Your exercise habits   Whether you smoke, drink alcohol, or use drugs   Whether you are sexually active   Whether you feel safe at home  Your doctor will talk to you about things you can do to improve your health and lower your risk of health problems. They will also offer help and support. For example, if you want to quit smoking, they can give you advice and might prescribe medicines. If you want to improve your diet or get more physical activity, they can help you with this, too.   Lab tests, if needed - The tests you get will depend on your age and situation. For example, your doctor might want to check your:   Cholesterol   Blood sugar   Iron level   Vaccines - The recommended vaccines will depend on your age, health, and what vaccines you already had. Vaccines are very important because they can prevent certain serious or deadly infections.   Discussion of screening - \"Screening\" means checking for diseases or other health problems before they cause symptoms. Your doctor can recommend screening based on your age, risk, and preferences. This might include tests to check for:   Cancer, such as breast, prostate, cervical, ovarian, colorectal, prostate, lung, or skin cancer   Sexually transmitted infections, such as chlamydia and gonorrhea   Mental health conditions like depression and anxiety  Your doctor will talk to you about the different types of screening tests. They can help you decide which screenings to have. They can also explain what the results might mean.   Answering questions - The physical is a good time to ask the doctor or nurse questions about your health. If needed, they can refer you to other doctors or specialists, too.  Adults older than 65 years often need other care, too. As you get older, your doctor will talk to you about:   How to prevent falling at " home   Hearing or vision tests   Memory testing   How to take your medicines safely   Making sure that you have the help and support you need at home  All topics are updated as new evidence becomes available and our peer review process is complete.  This topic retrieved from [employer] on: May 02, 2024.  Topic 646345 Version 1.0  Release: 32.4.3 - C32.122  © 2024 UpToDate, Inc. and/or its affiliates. All rights reserved.  Consumer Information Use and Disclaimer   Disclaimer: This generalized information is a limited summary of diagnosis, treatment, and/or medication information. It is not meant to be comprehensive and should be used as a tool to help the user understand and/or assess potential diagnostic and treatment options. It does NOT include all information about conditions, treatments, medications, side effects, or risks that may apply to a specific patient. It is not intended to be medical advice or a substitute for the medical advice, diagnosis, or treatment of a health care provider based on the health care provider's examination and assessment of a patient's specific and unique circumstances. Patients must speak with a health care provider for complete information about their health, medical questions, and treatment options, including any risks or benefits regarding use of medications. This information does not endorse any treatments or medications as safe, effective, or approved for treating a specific patient. UpToDate, Inc. and its affiliates disclaim any warranty or liability relating to this information or the use thereof.The use of this information is governed by the Terms of Use, available at https://www.woltersAccess Pharmaceuticalsuwer.com/en/know/clinical-effectiveness-terms. 2024© UpToDate, Inc. and its affiliates and/or licensors. All rights reserved.  Copyright   © 2024 UpToDate, Inc. and/or its affiliates. All rights reserved.

## 2025-05-22 NOTE — ASSESSMENT & PLAN NOTE
"/89 (BP Location: Left arm, Patient Position: Sitting, Cuff Size: Large)   Pulse 85   Resp 16   Ht 6' 2\" (1.88 m)   Wt 86.2 kg (190 lb)   SpO2 99%   BMI 24.39 kg/m²   Adeq bp control for age  Orders:    amLODIPine (NORVASC) 5 mg tablet; Take 1 tablet (5 mg total) by mouth daily    labetalol (NORMODYNE) 200 mg tablet; Take 1 tablet (200 mg total) by mouth every 12 (twelve) hours    losartan (COZAAR) 50 mg tablet; Take 1 tablet (50 mg total) by mouth daily    "

## 2025-05-22 NOTE — PROGRESS NOTES
"Adult Annual Physical  Name: Adrien Kerr      : 1950      MRN: [id number]  Encounter Provider: Travis Merlos MD  Encounter Date: 2025   Encounter department: [facility]    :  Assessment & Plan  Annual physical exam  Stable.   Known A fib has reasonable rate control  See dentist for dental issues       Encounter for immunization    Orders:    Zoster Vaccine Recombinant IM    Pneumococcal Conjugate Vaccine 20-valent (Pcv20)    TDAP VACCINE GREATER THAN OR EQUAL TO 6YO IM    Need for COVID-19 vaccine    Orders:    COVID-19 Pfizer mRNA vaccine 12 yr and older (Comirnaty pre-filled syringe)    Essential hypertension  /89 (BP Location: Left arm, Patient Position: Sitting, Cuff Size: Large)   Pulse 85   Resp 16   Ht 6' 2\" (1.88 m)   Wt 86.2 kg (190 lb)   SpO2 99%   BMI 24.39 kg/m²   Adeq bp control for age  Orders:    amLODIPine (NORVASC) 5 mg tablet; Take 1 tablet (5 mg total) by mouth daily    labetalol (NORMODYNE) 200 mg tablet; Take 1 tablet (200 mg total) by mouth every 12 (twelve) hours    losartan (COZAAR) 50 mg tablet; Take 1 tablet (50 mg total) by mouth daily    Prediabetes  Mild. Continue healthy diet and exercise       Status post total left knee replacement using cement    Orders:    acetaminophen (TYLENOL) 325 mg tablet; Take 1 tablet (325 mg total) by mouth every 4 (four) hours as needed for mild pain    Atrial fibrillation, unspecified type (HCC)    Orders:    apixaban (Eliquis) 5 mg; Take 1 tablet (5 mg total) by mouth 2 (two) times a day    Neuropathy    Orders:    Ascorbic Acid (Vitamin C ER) 500 MG CPCR; Take 1 capsule (500 mg total) by mouth in the morning    vitamin B-12 (CYANOCOBALAMIN) 50 MCG TABS; Take 1 tablet (50 mcg total) by mouth daily    History of hemorrhagic cerebrovascular accident (CVA) without residual deficits    Orders:    atorvastatin (LIPITOR) 10 mg tablet; Take 1 tablet (10 mg total) by mouth daily with " dinner    Major depressive disorder in partial remission, unspecified whether recurrent (HCC)      Orders:    escitalopram (LEXAPRO) 5 mg tablet; Take 1 tablet (5 mg total) by mouth daily at bedtime        Preventive Screenings:  - Diabetes Screening: screening up-to-date  - Cholesterol Screening: screening not indicated and has hyperlipidemia   - Hepatitis C screening: screening up-to-date   - HIV screening: screening not indicated   - Colon cancer screening: screening not indicated   - Lung cancer screening: screening not indicated   - Prostate cancer screening: screening not indicated     Immunizations:  - Immunizations due: Zoster (Shingrix)    Counseling/Anticipatory Guidance:  - Alcohol: discussed moderation in alcohol intake and recommendations for healthy alcohol use.   - Drug use: discussed harms of illicit drug use and how it can negatively impact mental/physical health.   - Tobacco use: discussed harms of tobacco use and management options for quitting.   - Dental health: discussed importance of regular tooth brushing, flossing, and dental visits.   - Diet: discussed recommendations for a healthy/well-balanced diet.   - Exercise: the importance of regular exercise/physical activity was discussed. Recommend exercise 3-5 times per week for at least 30 minutes.   - Injury prevention: discussed safety/seat belts, safety helmets, smoke detectors, carbon monoxide detectors, and smoking near bedding or upholstery.     Diet:   Breakfast: oatmeal and tea  Lunch: Roti, whole grain. Veggies No junk food.   Dinner: Roti, salad, fruit       Depression Screening and Follow-up Plan: Patient was screened for depression during today's encounter. They screened negative with a PHQ-2 score of 0.          History of Present Illness     Adult Annual Physical:  Patient presents for annual physical.     Diet and Physical Activity:  - Diet/Nutrition: no special diet.  - Exercise: walking.    Depression Screening:  - PHQ-2 Score:  "0    General Health:  - Sleep: sleeps well.  - Hearing: normal hearing left ear and normal hearing right ear.  - Vision: no vision problems.  - Dental: regular dental visits.     Health:  - History of STDs: no.   - Urinary symptoms: urinary frequency.     Advanced Care Planning:  - Has an advanced directive?: no    - Has a durable medical POA?: no    - ACP document given to patient?: no      Review of Systems   Constitutional:  Negative for chills and fever.   HENT:  Negative for ear pain and sore throat.    Eyes:  Negative for pain and visual disturbance.   Respiratory:  Negative for cough and shortness of breath.    Cardiovascular:  Negative for chest pain and palpitations.   Gastrointestinal:  Negative for abdominal pain and vomiting.   Genitourinary:  Negative for dysuria and hematuria.   Musculoskeletal:  Negative for arthralgias and back pain.   Skin:  Negative for color change and rash.   Neurological:  Negative for seizures and syncope.   All other systems reviewed and are negative.    Pertinent Medical History   STABLE  {There is no content from the last Pertinent Medical History section.}        Objective {?Quick Links Trend Vitals * Enter New Vitals * Results Review * Timeline (Adult) * Labs * Imaging * Cardiology * Procedures * Lung Cancer Screening * Surgical eConsent :68769}  /89 (BP Location: Left arm, Patient Position: Sitting, Cuff Size: Large)   Pulse 85   Resp 16   Ht 6' 2\" (1.88 m)   Wt 86.2 kg (190 lb)   SpO2 99%   BMI 24.39 kg/m²     Physical Exam  Constitutional:       Appearance: Normal appearance.   HENT:      Head: Normocephalic and atraumatic.      Right Ear: Tympanic membrane normal.      Left Ear: Tympanic membrane normal.      Nose: Nose normal.      Mouth/Throat:      Mouth: Mucous membranes are moist.      Pharynx: Oropharynx is clear.      Comments: Has dental issues - see dentist    Eyes:      Pupils: Pupils are equal, round, and reactive to light. "       Cardiovascular:      Rate and Rhythm: Rhythm irregular.      Heart sounds: No murmur heard.     No friction rub. No gallop.   Pulmonary:      Effort: Pulmonary effort is normal.      Breath sounds: Normal breath sounds.   Abdominal:      General: Abdomen is flat.      Palpations: Abdomen is soft. There is no mass.      Tenderness: There is no abdominal tenderness.     Musculoskeletal:         General: Deformity present.      Cervical back: Neck supple.      Comments: Chronic swelling ankles (does not bother him)     Skin:     General: Skin is warm and dry.      Capillary Refill: Capillary refill takes less than 2 seconds.      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      Mental Status: He is alert and oriented to person, place, and time.     Psychiatric:         Mood and Affect: Mood normal.         Behavior: Behavior normal.         Thought Content: Thought content normal.         Judgment: Judgment normal.       {Administrative / Billing Section (Optional):45526}

## 2025-05-25 NOTE — ASSESSMENT & PLAN NOTE
Orders:  •  atorvastatin (LIPITOR) 10 mg tablet; Take 1 tablet (10 mg total) by mouth daily with dinner

## 2025-05-25 NOTE — ASSESSMENT & PLAN NOTE
"/89 (BP Location: Left arm, Patient Position: Sitting, Cuff Size: Large)   Pulse 85   Resp 16   Ht 6' 2\" (1.88 m)   Wt 86.2 kg (190 lb)   SpO2 99%   BMI 24.39 kg/m²     Orders:  •  amLODIPine (NORVASC) 5 mg tablet; Take 1 tablet (5 mg total) by mouth daily  •  labetalol (NORMODYNE) 200 mg tablet; Take 1 tablet (200 mg total) by mouth every 12 (twelve) hours  •  losartan (COZAAR) 50 mg tablet; Take 1 tablet (50 mg total) by mouth daily  "

## 2025-05-25 NOTE — PROGRESS NOTES
"Adult Annual Physical  Name: Adrien Kerr      : 1950      MRN: [id number]  Encounter Provider: Travis Merlos MD  Encounter Date: 2025   Encounter department: [facility]    :  Assessment & Plan  Annual physical exam  Stable. In good health for age       Encounter for immunization    Orders:  •  Zoster Vaccine Recombinant IM  •  Pneumococcal Conjugate Vaccine 20-valent (Pcv20)  •  TDAP VACCINE GREATER THAN OR EQUAL TO 8YO IM    Need for COVID-19 vaccine    Orders:  •  COVID-19 Pfizer mRNA vaccine 12 yr and older (Comirnaty pre-filled syringe)    Essential hypertension  /89 (BP Location: Left arm, Patient Position: Sitting, Cuff Size: Large)   Pulse 85   Resp 16   Ht 6' 2\" (1.88 m)   Wt 86.2 kg (190 lb)   SpO2 99%   BMI 24.39 kg/m²     Orders:  •  amLODIPine (NORVASC) 5 mg tablet; Take 1 tablet (5 mg total) by mouth daily  •  labetalol (NORMODYNE) 200 mg tablet; Take 1 tablet (200 mg total) by mouth every 12 (twelve) hours  •  losartan (COZAAR) 50 mg tablet; Take 1 tablet (50 mg total) by mouth daily    Prediabetes  Healthy lifestyle reviewed       Status post total left knee replacement using cement  Doing well, Mild ache sometimes  Orders:  •  acetaminophen (TYLENOL) 325 mg tablet; Take 1 tablet (325 mg total) by mouth every 4 (four) hours as needed for mild pain    Atrial fibrillation, unspecified type (HCC)  Clinically well compensated  Orders:  •  apixaban (Eliquis) 5 mg; Take 1 tablet (5 mg total) by mouth 2 (two) times a day    Neuropathy    Orders:  •  Ascorbic Acid (Vitamin C ER) 500 MG CPCR; Take 1 capsule (500 mg total) by mouth in the morning  •  vitamin B-12 (CYANOCOBALAMIN) 50 MCG TABS; Take 1 tablet (50 mcg total) by mouth daily    History of hemorrhagic cerebrovascular accident (CVA) without residual deficits    Orders:  •  atorvastatin (LIPITOR) 10 mg tablet; Take 1 tablet (10 mg total) by mouth daily with dinner    Major depressive " disorder in partial remission, unspecified whether recurrent (HCC)  Mood wnl now    Orders:  •  escitalopram (LEXAPRO) 5 mg tablet; Take 1 tablet (5 mg total) by mouth daily at bedtime        Preventive Screenings:  - Diabetes Screening: screening up-to-date  - Cholesterol Screening: screening not indicated and has hyperlipidemia   - Hepatitis C screening: screening up-to-date   - HIV screening: risks/benefits discussed and screening not indicated   - Colon cancer screening: risks/benefits discussed and screening not indicated   - Lung cancer screening: screening not indicated   - Prostate cancer screening: screening not indicated     Immunizations:    - Risks/benefits immunizations discussed    - Immunizations given per orders      Counseling/Anticipatory Guidance:  - Alcohol: discussed moderation in alcohol intake and recommendations for healthy alcohol use.   - Drug use: discussed harms of illicit drug use and how it can negatively impact mental/physical health.   - Tobacco use: discussed harms of tobacco use and management options for quitting.   - Dental health: discussed importance of regular tooth brushing, flossing, and dental visits.   - Diet: discussed recommendations for a healthy/well-balanced diet.   - Exercise: the importance of regular exercise/physical activity was discussed. Recommend exercise 3-5 times per week for at least 30 minutes.       Depression Screening and Follow-up Plan: Patient was screened for depression during today's encounter. They screened negative with a PHQ-2 score of 0.        History of Present Illness     Adult Annual Physical:  Patient presents for annual physical.     Diet and Physical Activity:  - Diet/Nutrition: well balanced diet.  - Exercise: walking.    Depression Screening:  - PHQ-2 Score: 0    General Health:  - Sleep: sleeps well.  - Hearing: normal hearing bilateral ears.  - Vision: no vision problems.  - Dental: regular dental visits.     Health:  - History of  "STDs: no.   - Urinary symptoms: none.     Advanced Care Planning:  - Has an advanced directive?: no    - Has a durable medical POA?: no    - ACP document given to patient?: no      Review of Systems   Constitutional:  Negative for chills and fever.   HENT:  Negative for ear pain and sore throat.    Eyes:  Negative for pain and visual disturbance.   Respiratory:  Negative for cough and shortness of breath.    Cardiovascular:  Negative for chest pain and palpitations.   Gastrointestinal:  Negative for abdominal pain and vomiting.   Genitourinary:  Negative for dysuria and hematuria.   Musculoskeletal:  Negative for arthralgias and back pain.   Skin:  Negative for color change and rash.   Neurological:  Negative for seizures and syncope.   Psychiatric/Behavioral:  Negative for confusion, dysphoric mood and sleep disturbance.    All other systems reviewed and are negative.        Objective   /89 (BP Location: Left arm, Patient Position: Sitting, Cuff Size: Large)   Pulse 85   Resp 16   Ht 6' 2\" (1.88 m)   Wt 86.2 kg (190 lb)   SpO2 99%   BMI 24.39 kg/m²     Physical Exam  Vitals and nursing note reviewed.   Constitutional:       General: He is not in acute distress.     Appearance: Normal appearance. He is well-developed.   HENT:      Head: Normocephalic and atraumatic.      Right Ear: Tympanic membrane normal.      Left Ear: Tympanic membrane normal.      Nose: Nose normal.      Mouth/Throat:      Mouth: Mucous membranes are moist.      Comments: Some dental issues - see dentist    Eyes:      Conjunctiva/sclera: Conjunctivae normal.       Cardiovascular:      Rate and Rhythm: Normal rate. Rhythm irregular.      Pulses: Normal pulses.      Heart sounds: Normal heart sounds. No murmur heard.  Pulmonary:      Effort: Pulmonary effort is normal. No respiratory distress.      Breath sounds: Normal breath sounds.   Abdominal:      General: There is no distension.      Palpations: Abdomen is soft.      Tenderness: " There is no abdominal tenderness.     Musculoskeletal:         General: No swelling.      Cervical back: Neck supple.      Right lower leg: No edema.      Left lower leg: No edema.     Skin:     General: Skin is warm and dry.      Capillary Refill: Capillary refill takes less than 2 seconds.      Coloration: Skin is not jaundiced.      Findings: No rash.     Neurological:      General: No focal deficit present.      Mental Status: He is alert and oriented to person, place, and time.     Psychiatric:         Mood and Affect: Mood normal.         Behavior: Behavior normal.         Thought Content: Thought content normal.         Judgment: Judgment normal.

## 2025-05-28 ENCOUNTER — OFFICE VISIT (OUTPATIENT)
Dept: CARDIOLOGY CLINIC | Facility: CLINIC | Age: 75
End: 2025-05-28
Payer: COMMERCIAL

## 2025-05-28 VITALS
HEART RATE: 78 BPM | DIASTOLIC BLOOD PRESSURE: 72 MMHG | BODY MASS INDEX: 24.13 KG/M2 | SYSTOLIC BLOOD PRESSURE: 122 MMHG | OXYGEN SATURATION: 99 % | WEIGHT: 188 LBS | HEIGHT: 74 IN

## 2025-05-28 DIAGNOSIS — Z96.652 STATUS POST TOTAL LEFT KNEE REPLACEMENT USING CEMENT: ICD-10-CM

## 2025-05-28 DIAGNOSIS — Z86.73 HISTORY OF HEMORRHAGIC CEREBROVASCULAR ACCIDENT (CVA) WITHOUT RESIDUAL DEFICITS: ICD-10-CM

## 2025-05-28 DIAGNOSIS — I10 ESSENTIAL HYPERTENSION: ICD-10-CM

## 2025-05-28 DIAGNOSIS — I48.21 PERMANENT ATRIAL FIBRILLATION (HCC): ICD-10-CM

## 2025-05-28 DIAGNOSIS — Z86.73 HISTORY OF STROKE: ICD-10-CM

## 2025-05-28 DIAGNOSIS — I11.9 HYPERTENSIVE HEART DISEASE WITHOUT HEART FAILURE: ICD-10-CM

## 2025-05-28 DIAGNOSIS — F41.9 ANXIETY: ICD-10-CM

## 2025-05-28 DIAGNOSIS — E78.2 MIXED HYPERLIPIDEMIA: ICD-10-CM

## 2025-05-28 PROCEDURE — 99214 OFFICE O/P EST MOD 30 MIN: CPT | Performed by: INTERNAL MEDICINE

## 2025-05-28 PROCEDURE — 93000 ELECTROCARDIOGRAM COMPLETE: CPT | Performed by: INTERNAL MEDICINE

## 2025-05-28 RX ORDER — THIAMINE HCL 100 MG
CAPSULE ORAL
COMMUNITY
Start: 2025-05-22

## 2025-05-28 RX ORDER — AMLODIPINE BESYLATE 5 MG/1
5 TABLET ORAL DAILY
Qty: 90 TABLET | Refills: 3 | Status: SHIPPED | OUTPATIENT
Start: 2025-05-28

## 2025-05-28 RX ORDER — LOSARTAN POTASSIUM 50 MG/1
50 TABLET ORAL DAILY
Qty: 90 TABLET | Refills: 3 | Status: SHIPPED | OUTPATIENT
Start: 2025-05-28

## 2025-05-28 RX ORDER — ATORVASTATIN CALCIUM 10 MG/1
10 TABLET, FILM COATED ORAL
Qty: 90 TABLET | Refills: 3 | Status: SHIPPED | OUTPATIENT
Start: 2025-05-28

## 2025-05-28 RX ORDER — LABETALOL 200 MG/1
200 TABLET, FILM COATED ORAL EVERY 12 HOURS
Qty: 180 TABLET | Refills: 3 | Status: SHIPPED | OUTPATIENT
Start: 2025-05-28

## 2025-05-28 RX ORDER — SPIRONOLACTONE 25 MG/1
12.5 TABLET ORAL AS NEEDED
Qty: 30 TABLET | Refills: 1 | Status: SHIPPED | OUTPATIENT
Start: 2025-05-28

## 2025-05-28 NOTE — PROGRESS NOTES
Progress note.- Cardiology Office  [facility].    Adrien Kerr 75 y.o. male MRN: [id number]  : 1950  Unit/Bed#:  Encounter: [id number]      Assessment:     1. Permanent atrial fibrillation (HCC)    2. Hypertensive heart disease without heart failure    3. Mixed hyperlipidemia    4. History of stroke    5. Anxiety    6. Essential hypertension    7. Status post total left knee replacement using cement    8. History of hemorrhagic cerebrovascular accident (CVA) without residual deficits        Discussion summary and Plan:    1.  Atrial fibrillation probably chronic for long.  Of time.  He does not feel any palpitations.  His last cardiac workup shows his EF is 50 to 55% with mildly elevated pulmonary artery pressure.  His stress test was normal.  Currently heart rate is 78 beats a minute ambulatory monitoring shows average heart rate acceptable.  Will continue Eliquis 5 mg twice a day and labetalol and check echo Doppler to see current EF.  He should related to Eliquis discussed.  No issues with bleeding problem hemoglobin has been stable.        2.  Hypertensive heart disease without heart failure.  Blood pressure is acceptable he is on Normodyne, amlodipine 5 mg daily, losartan 50 mg daily.  He occasionally develops leg swelling will give him Aldactone prescription to use as needed 12.5 mg.  Not to be taken more than once or twice a week.    3.  Dyslipidemia.  Continue statin cholesterol profile from May 2025 acceptable.    4.  Personal history of stroke as well as family history of stroke.  Issues discussed with patient and patient's daughter and started on Eliquis 5 mg twice a day with food    5.  Abnormal EKG abnormal EKG with atrial fibrillation.  Nuclear stress test shows no ischemia    6.  History of knee replacement surgery.  Management as per orthopedics.    7.  History of anxiety.  Currently stable.      Patient and patient's daughter was advised and educated to call our office   immediately if  patient has any new symptoms of chest pain/shortness of breath, near-syncope, syncope, light headedness sustained palpitations or any other cardiovascular symptoms before their scheduled follow-up appointment.  Office number was provided #[phone].      Thank you for your consultation.  If you have any question please call me at [phone]    Counseling :  A description of the counseling.  Goals and Barriers.  Patient's ability to self care: Yes  Medication side effect reviewed with patient in detail and all their questions answered to their satisfaction.      Primary Care Physician : Melquiades Blanco MD      HPI :     Adrien Kerr is a 75 y.o. year old male who was referred by primary care doctor for difficult control blood pressure.  Patient has known history of hypertensive heart disease, dyslipidemia, history of DJD with knee replacement surgery.  Patient was first diagnosed to have history of stroke because himself difficult to speak in 2005 and found to have high blood pressure.  He was prescribed medication then it was stopped later on he was found to have very high blood pressure and put on these 3 medications.  His blood pressure has been better today.  He generally has better readings as per the epic note.  His EKG shows atrial fibrillation with heart rate 74 bpm.  Atrial fibrillation is newly noted.  His last EKG in [facility] in 2020 shows he was in sinus rhythm.  He does not feel much difference he does exercise and other issues without any problem.  EKG also shows T wave inversions in inferior leads.  He does have history of stroke in the past.  He had strong family history of stroke his both parents had history of stroke did did not do more detail.  His past surgical is significant for bilateral knee replacement surgery and hernia surgery.    5/28/2025.    Above reviewed.  Patient has a medical history significant for difficulty control hypertension, dyslipidemia, DJD with history of knee  replacement surgery, history of stroke who had irregular heartbeat and was noted to be in atrial fibrillation.  He also had abnormal EKG and he underwent cardiac workup in the form of ambulatory monitoring stress test and echo Doppler.  His ambulatory monitoring was done in January 2024.  He has blood test done 5/13/2025 which were acceptable.  His daughter has noted some swelling when he traveled by plane.  Currently not much swelling.  He does get swelling sometime when he sits most of the time.  He otherwise feels well.  His current medication reviewed he is on amlodipine 5 mg daily, labetalol 200 twice a day losartan 50 mg daily and atorvastatin 10 mg daily.  He is also on Eliquis no issues no falls.  Blood test shows hemoglobin is stable.    Review of Systems   Constitutional:  Negative for activity change, chills, diaphoresis, fever and unexpected weight change.   HENT:  Negative for congestion.    Eyes:  Negative for discharge and redness.   Respiratory:  Negative for cough, chest tightness, shortness of breath and wheezing.    Cardiovascular:  Positive for leg swelling. Negative for chest pain and palpitations.        He is sitting on the flight  for longer time   Gastrointestinal:  Negative for abdominal pain, diarrhea and nausea.   Endocrine: Negative.    Genitourinary:  Negative for decreased urine volume and urgency.   Musculoskeletal:  Positive for arthralgias. Negative for back pain and gait problem.   Skin:  Negative for rash and wound.   Allergic/Immunologic: Negative.    Neurological:  Negative for dizziness, seizures, syncope, weakness, light-headedness and headaches.   Hematological: Negative.    Psychiatric/Behavioral:  Negative for agitation and confusion. The patient is not nervous/anxious.        Historical Information   Past Medical History:   Diagnosis Date   • Anemia 01/04/2021   • Anxiety    • Arthritis    • 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  (HCC)    • Hyperlipidemia    • Status post  right inguinal hernia repair 12/25/2020   • Status post total knee replacement using cement, right 04/05/2021   • Status post total left knee replacement using cement 07/13/2021   • Stroke (HCC) 2005   • Systemic inflammatory response syndrome (HCC) 12/25/2020     Past Surgical History:   Procedure Laterality Date   • HERNIA REPAIR     • ID ARTHRP KNE CONDYLE&PLATU MEDIAL&LAT COMPARTMENTS Right 4/5/2021    Procedure: ARTHROPLASTY KNEE TOTAL;  Surgeon: Justin Lucero DO;  Location: [facility];  Service: Orthopedics   • ID ARTHRP KNE CONDYLE&PLATU MEDIAL&LAT COMPARTMENTS Left 7/13/2021    Procedure: ARTHROPLASTY KNEE TOTAL;  Surgeon: Justin Lucero DO;  Location: [facility];  Service: Orthopedics     Social History     Substance and Sexual Activity   Alcohol Use Never     Social History     Substance and Sexual Activity   Drug Use Never     Social History     Tobacco Use   Smoking Status Never   Smokeless Tobacco Never     Family History:   Family History   Problem Relation Name Age of Onset   • Stroke Mother     • Stroke Father         Meds/Allergies     No Known Allergies    Current Outpatient Medications:   •  acetaminophen (TYLENOL) 325 mg tablet, Take 1 tablet (325 mg total) by mouth every 4 (four) hours as needed for mild pain, Disp: 90 tablet, Rfl: 11  •  amLODIPine (NORVASC) 5 mg tablet, Take 1 tablet (5 mg total) by mouth daily, Disp: 90 tablet, Rfl: 3  •  apixaban (Eliquis) 5 mg, Take 1 tablet (5 mg total) by mouth 2 (two) times a day, Disp: 180 tablet, Rfl: 3  •  Ascorbic Acid (Vitamin C ER) 500 MG CPCR, Take 1 capsule (500 mg total) by mouth in the morning, Disp: 90 capsule, Rfl: 3  •  atorvastatin (LIPITOR) 10 mg tablet, Take 1 tablet (10 mg total) by mouth daily with dinner, Disp: 90 tablet, Rfl: 3  •  escitalopram (LEXAPRO) 5 mg tablet, Take 1 tablet (5 mg total) by mouth daily at bedtime, Disp: 90 tablet, Rfl: 1  •  labetalol (NORMODYNE) 200 mg tablet, Take 1 tablet (200 mg total) by mouth every 12  "(twelve) hours, Disp: 180 tablet, Rfl: 3  •  losartan (COZAAR) 50 mg tablet, Take 1 tablet (50 mg total) by mouth daily, Disp: 90 tablet, Rfl: 3  •  spironolactone (ALDACTONE) 25 mg tablet, Take 0.5 tablets (12.5 mg total) by mouth if needed (For leg swelling.), Disp: 30 tablet, Rfl: 1  •  Thiamine HCl (Vitamin B-1) 100 MG CAPS, , Disp: , Rfl:   •  vitamin B-12 (CYANOCOBALAMIN) 50 MCG TABS, Take 1 tablet (50 mcg total) by mouth daily, Disp: 90 tablet, Rfl: 3    Vitals: Blood pressure 122/72, pulse 78, height 6' 2\" (1.88 m), weight 85.3 kg (188 lb), SpO2 99%.    Body mass index is 24.14 kg/m².  Wt Readings from Last 3 Encounters:   05/28/25 85.3 kg (188 lb)   05/22/25 86.2 kg (190 lb)   05/02/24 84.8 kg (187 lb)     Vitals:    05/28/25 1013   Weight: 85.3 kg (188 lb)       BP Readings from Last 3 Encounters:   05/28/25 122/72   05/22/25 147/89   05/02/24 123/81         Physical Exam  Constitutional:       General: He is not in acute distress.     Appearance: He is well-developed. He is not diaphoretic.   Neck:      Thyroid: No thyromegaly.      Vascular: No JVD.     Cardiovascular:      Rate and Rhythm: Normal rate. Rhythm irregularly irregular.      Heart sounds: S1 normal and S2 normal. Heart sounds not distant. Murmur heard.      Systolic murmur is present.      No friction rub. No gallop. No S3 or S4 sounds.   Pulmonary:      Effort: Pulmonary effort is normal. No respiratory distress.      Breath sounds: Normal breath sounds. No wheezing or rales.   Chest:      Chest wall: No tenderness.   Abdominal:      General: There is no distension.      Palpations: Abdomen is soft.      Tenderness: There is no abdominal tenderness.     Musculoskeletal:         General: No deformity.      Cervical back: Neck supple.   Lymphadenopathy:      Cervical: No cervical adenopathy.     Skin:     General: Skin is warm and dry.      Coloration: Skin is not pale.      Findings: No rash.     Neurological:      Mental Status: He is alert " and oriented to person, place, and time.     Psychiatric:         Judgment: Judgment normal.             Diagnostic Studies Review Cardio:    Echo Doppler.  Echo Doppler done October 2023 EF 50 to 55%, left him severely dilated right atrium severely dilated, mild MR with pulmonary artery pressure 47 mmHg.    Nuclear stress test October 2023 shows no ischemia EF was 63%.  Elevated blood pressure was noted.    Ambulatory monitoring done in January 2024 shows patient is 100% atrial fibrillation.      EKG:  Twelve-lead EKG 10/5/2023 atrial fibrillation with heart rate 74 bpm.  As compared to previous EKG available in epic in 2020 atrial fibrillation is newly noted.    Twelve-lead EKG done on 5/28/2025.  Atrial fibrillation with heart rate 78 bpm.  QS in V1-V2 no change from previous EKG.    XR chest pa & lateral    Result Date: 9/29/2023  Narrative: CHEST INDICATION:   R05.2: Subacute cough. COMPARISON: Chest x-ray 3/23/2021 EXAM PERFORMED/VIEWS:  XR CHEST PA & LATERAL The frontal view was performed utilizing dual energy radiographic technique. Images: 4 FINDINGS: Cardiomediastinal silhouette appears unremarkable. The lungs are clear.  No pneumothorax or pleural effusion. Chronic left clavicle fracture. Degenerative changes of spine.     Impression: No acute cardiopulmonary disease. Workstation performed: [id number]       Lab Review   Lab Results   Component Value Date    WBC 4.80 05/13/2025    HGB 12.7 05/13/2025    HCT 39.4 05/13/2025    MCV 90 05/13/2025    RDW 13.9 05/13/2025     05/13/2025     BMP:  Lab Results   Component Value Date    SODIUM 137 05/13/2025    K 3.8 05/13/2025     05/13/2025    CO2 26 05/13/2025    BUN 19 05/13/2025    CREATININE 1.20 05/13/2025    GLUC 109 (H) 09/02/2021    GLUF 89 05/13/2025    CALCIUM 9.2 05/13/2025    CORRECTEDCA 9.0 03/23/2021    EGFR 57 05/13/2025    MG 2.1 12/28/2020     LFT:  Lab Results   Component Value Date    AST 25 05/13/2025    ALT 15 05/13/2025     "ALKPHOS 65 05/13/2025    TP 7.2 05/13/2025    ALB 4.0 05/13/2025      Lab Results   Component Value Date    HAN5SNBOKHJA 3.700 05/13/2025     No components found for: \"TSH3\"  Lab Results   Component Value Date    HGBA1C 5.8 (H) 05/13/2025     Lipid Profile:   Lab Results   Component Value Date    CHOLESTEROL 128 05/13/2025    HDL 56 05/13/2025    LDLCALC 58 05/13/2025    TRIG 72 05/13/2025     Lab Results   Component Value Date    CHOLESTEROL 128 05/13/2025    CHOLESTEROL 115 04/27/2024             Dr. Char Kerr MD [facility]      \"This note was completed in part utilizing m-modal fluency direct voice recognition software.   Grammatical errors, random word insertion, spelling mistakes, and incomplete sentences may be an occasional consequence of the system secondary to software limitations, ambient noise and hardware issues.    Please read the chart carefully and recognize, using context, where substitutions have occurred.  If you have any questions or concerns about the context, text or information contained within the body of this dictation, please contact myself, the provider, for further clarification.\"  "

## 2025-06-25 DIAGNOSIS — I11.9 HYPERTENSIVE HEART DISEASE WITHOUT HEART FAILURE: ICD-10-CM

## 2025-06-26 RX ORDER — SPIRONOLACTONE 25 MG/1
TABLET ORAL
Qty: 30 TABLET | Refills: 1 | Status: SHIPPED | OUTPATIENT
Start: 2025-06-26

## 2025-08-20 DIAGNOSIS — I11.9 HYPERTENSIVE HEART DISEASE WITHOUT HEART FAILURE: ICD-10-CM

## 2025-08-21 RX ORDER — SPIRONOLACTONE 25 MG/1
TABLET ORAL
Qty: 30 TABLET | Refills: 2 | Status: SHIPPED | OUTPATIENT
Start: 2025-08-21

## (undated) DEVICE — GLOVE INDICATOR PI UNDERGLOVE SZ 7.5 BLUE

## (undated) DEVICE — GLOVE SRG BIOGEL 7.5

## (undated) DEVICE — DRESSING MEPILEX AG BORDER 4 X 12 IN

## (undated) DEVICE — HANDPIECE SET WITH HIGH FLOW TIP AND SUCTION TUBE: Brand: INTERPULSE

## (undated) DEVICE — ASTOUND SURGICAL GOWN, XXX LARGE, X-LONG: Brand: CONVERTORS

## (undated) DEVICE — 450 ML BOTTLE OF 0.05% CHLORHEXIDINE GLUCONATE IN 99.95% STERILE WATER FOR IRRIGATION, USP AND APPLICATOR.: Brand: IRRISEPT ANTIMICROBIAL WOUND LAVAGE

## (undated) DEVICE — SUT STRATAFIX SPIRAL 3-0 PGA/PCL 30 X 30 CM SXMD2B410

## (undated) DEVICE — HOOD: Brand: FLYTE, SURGICOOL

## (undated) DEVICE — ANTIBACTERIAL UNDYED BRAIDED (POLYGLACTIN 910), SYNTHETIC ABSORBABLE SUTURE: Brand: COATED VICRYL

## (undated) DEVICE — SKIN MARKER DUAL TIP WITH RULER CAP, FLEXIBLE RULER AND LABELS: Brand: DEVON

## (undated) DEVICE — VEST SURGEON DISPOSABLE

## (undated) DEVICE — SUT VICRYL 0 CP-1 27 IN J267H

## (undated) DEVICE — CAPIT KNEE ATTUNE FB CEMENT - DEPUY

## (undated) DEVICE — TRAY FOLEY 16FR URIMETER SURESTEP

## (undated) DEVICE — GLOVE INDICATOR PI UNDERGLOVE SZ 8 BLUE

## (undated) DEVICE — SUT STRATAFIX SPIRAL 1-0  CT-1 30 X 30CM SXPD2B403

## (undated) DEVICE — ADHESIVE SKIN CLSR DERMABOND NX

## (undated) DEVICE — DRAPE SHEET X-LG

## (undated) DEVICE — GLOVE INDICATOR PI UNDERGLOVE SZ 8.5 BLUE

## (undated) DEVICE — INTENDED FOR TISSUE SEPARATION, AND OTHER PROCEDURES THAT REQUIRE A SHARP SURGICAL BLADE TO PUNCTURE OR CUT.: Brand: BARD-PARKER ® CARBON RIB-BACK BLADES

## (undated) DEVICE — GLOVE SRG BIOGEL ORTHOPEDIC 8.5

## (undated) DEVICE — INSTRUMENT POUCH: Brand: CONVERTORS

## (undated) DEVICE — LIGHT GLOVE GREEN

## (undated) DEVICE — GLOVE SRG BIOGEL 8

## (undated) DEVICE — ORTHOPEDIC PACK: Brand: CARDINAL HEALTH

## (undated) DEVICE — DUAL CUT SAGITTAL BLADE

## (undated) DEVICE — 3M™ IOBAN™ 2 ANTIMICROBIAL INCISE DRAPE 6650EZ: Brand: IOBAN™ 2

## (undated) DEVICE — TIBURON EXTREMITY SHEET: Brand: CONVERTORS

## (undated) DEVICE — BANDAGE, ESMARK LF STR 6"X9' (20/CS): Brand: CYPRESS

## (undated) DEVICE — LINER FOOT PAD STERILE DISPOSABLE

## (undated) DEVICE — BASIC DOUBLE BASIN 2-LF: Brand: MEDLINE INDUSTRIES, INC.

## (undated) DEVICE — 3M™ STERI-DRAPE™ U-DRAPE 1015: Brand: STERI-DRAPE™

## (undated) DEVICE — TEDS THIGH MED REG

## (undated) DEVICE — SPINNING CEMENT MIXING BOWL

## (undated) DEVICE — CHLORAPREP HI-LITE 26ML ORANGE

## (undated) DEVICE — PREP SURGICAL PURPREP 26ML

## (undated) DEVICE — CUFF TOURNIQUET 34 X 4 IN QUICK CONNECT DISP 1BLA